# Patient Record
Sex: FEMALE | Race: WHITE | Employment: OTHER | URBAN - METROPOLITAN AREA
[De-identification: names, ages, dates, MRNs, and addresses within clinical notes are randomized per-mention and may not be internally consistent; named-entity substitution may affect disease eponyms.]

---

## 2017-05-30 ENCOUNTER — HOSPITAL ENCOUNTER (OUTPATIENT)
Dept: GENERAL RADIOLOGY | Age: 67
Discharge: HOME | End: 2017-05-30
Attending: FAMILY MEDICINE
Payer: MEDICARE

## 2017-05-30 DIAGNOSIS — M25.552 LEFT HIP PAIN: ICD-10-CM

## 2017-05-30 PROCEDURE — 73502 X-RAY EXAM HIP UNI 2-3 VIEWS: CPT

## 2017-05-30 NOTE — PROGRESS NOTES
I called pt with normal xray of hip/pelvis. Advised pt to call me back in a few days with update on her sxms, and whether prednisone is helping.   INEZ/addison

## 2017-08-15 ENCOUNTER — HOSPITAL ENCOUNTER (OUTPATIENT)
Dept: LAB | Age: 67
Discharge: HOME | End: 2017-08-15

## 2017-08-15 PROCEDURE — 88305 TISSUE EXAM BY PATHOLOGIST: CPT | Performed by: INTERNAL MEDICINE

## 2017-08-16 PROBLEM — G20 PARKINSON DISEASE (HCC): Status: ACTIVE | Noted: 2017-07-28

## 2017-08-24 ENCOUNTER — HOSPITAL ENCOUNTER (OUTPATIENT)
Dept: CT IMAGING | Age: 67
Discharge: HOME | End: 2017-08-24
Attending: FAMILY MEDICINE
Payer: MEDICARE

## 2017-08-24 DIAGNOSIS — R19.00 PELVIC MASS IN FEMALE: ICD-10-CM

## 2017-08-24 PROCEDURE — 74011000258 HC RX REV CODE- 258: Performed by: FAMILY MEDICINE

## 2017-08-24 PROCEDURE — 72193 CT PELVIS W/DYE: CPT

## 2017-08-24 PROCEDURE — 74011636320 HC RX REV CODE- 636/320: Performed by: FAMILY MEDICINE

## 2017-08-24 RX ORDER — SODIUM CHLORIDE 0.9 % (FLUSH) 0.9 %
10 SYRINGE (ML) INJECTION
Status: COMPLETED | OUTPATIENT
Start: 2017-08-24 | End: 2017-08-24

## 2017-08-24 RX ADMIN — IOPAMIDOL 100 ML: 755 INJECTION, SOLUTION INTRAVENOUS at 11:04

## 2017-08-24 RX ADMIN — DIATRIZOATE MEGLUMINE AND DIATRIZOATE SODIUM 15 ML: 660; 100 LIQUID ORAL; RECTAL at 11:05

## 2017-08-24 RX ADMIN — SODIUM CHLORIDE 100 ML: 900 INJECTION, SOLUTION INTRAVENOUS at 11:04

## 2017-08-24 RX ADMIN — Medication 10 ML: at 11:04

## 2017-08-24 NOTE — PROGRESS NOTES
Nothing on CT scan to explain palpable mass but it did show an incidental finding--left renal mass. Radiology recommends renal US, which I have ordered. Please call and explain to patient.

## 2017-08-30 ENCOUNTER — HOSPITAL ENCOUNTER (OUTPATIENT)
Dept: ULTRASOUND IMAGING | Age: 67
Discharge: HOME | End: 2017-08-30
Payer: MEDICARE

## 2017-08-30 DIAGNOSIS — N28.89 LEFT RENAL MASS: ICD-10-CM

## 2017-08-30 PROCEDURE — 76770 US EXAM ABDO BACK WALL COMP: CPT

## 2017-08-30 NOTE — PROGRESS NOTES
Patient notified of Renal US result and verbalized understanding of plan for Urology referral for further evaluation. Referral placed.

## 2017-09-21 ENCOUNTER — HOSPITAL ENCOUNTER (OUTPATIENT)
Dept: LAB | Age: 67
Discharge: HOME | End: 2017-09-21
Payer: MEDICARE

## 2017-09-21 DIAGNOSIS — N28.89 RENAL MASS: ICD-10-CM

## 2017-09-21 LAB
ANION GAP SERPL CALC-SCNC: 5 MMOL/L (ref 7–16)
APTT PPP: 27.4 SEC (ref 23.5–31.7)
BUN SERPL-MCNC: 18 MG/DL (ref 8–23)
CALCIUM SERPL-MCNC: 9.3 MG/DL (ref 8.3–10.4)
CHLORIDE SERPL-SCNC: 107 MMOL/L (ref 98–107)
CO2 SERPL-SCNC: 30 MMOL/L (ref 21–32)
CREAT SERPL-MCNC: 1.17 MG/DL (ref 0.6–1)
ERYTHROCYTE [DISTWIDTH] IN BLOOD BY AUTOMATED COUNT: 13.3 % (ref 11.9–14.6)
GLUCOSE SERPL-MCNC: 87 MG/DL (ref 65–100)
HCT VFR BLD AUTO: 40.6 % (ref 35.8–46.3)
HGB BLD-MCNC: 13.1 G/DL (ref 11.7–15.4)
INR PPP: 1 (ref 0.9–1.2)
MCH RBC QN AUTO: 28.8 PG (ref 26.1–32.9)
MCHC RBC AUTO-ENTMCNC: 32.3 G/DL (ref 31.4–35)
MCV RBC AUTO: 89.2 FL (ref 79.6–97.8)
PLATELET # BLD AUTO: 260 K/UL (ref 150–450)
PMV BLD AUTO: 9.7 FL (ref 10.8–14.1)
POTASSIUM SERPL-SCNC: 4.5 MMOL/L (ref 3.5–5.1)
PROTHROMBIN TIME: 10.7 SEC (ref 9.6–12)
RBC # BLD AUTO: 4.55 M/UL (ref 4.05–5.25)
SODIUM SERPL-SCNC: 142 MMOL/L (ref 136–145)
WBC # BLD AUTO: 6.4 K/UL (ref 4.3–11.1)

## 2017-09-21 PROCEDURE — 85730 THROMBOPLASTIN TIME PARTIAL: CPT | Performed by: UROLOGY

## 2017-09-21 PROCEDURE — 80048 BASIC METABOLIC PNL TOTAL CA: CPT | Performed by: UROLOGY

## 2017-09-21 PROCEDURE — 36415 COLL VENOUS BLD VENIPUNCTURE: CPT | Performed by: UROLOGY

## 2017-09-21 PROCEDURE — 85027 COMPLETE CBC AUTOMATED: CPT | Performed by: UROLOGY

## 2017-09-21 PROCEDURE — 85610 PROTHROMBIN TIME: CPT | Performed by: UROLOGY

## 2017-09-27 ENCOUNTER — HOSPITAL ENCOUNTER (OUTPATIENT)
Dept: CT IMAGING | Age: 67
Discharge: HOME | End: 2017-09-27
Attending: UROLOGY
Payer: MEDICARE

## 2017-09-27 VITALS
HEIGHT: 63 IN | BODY MASS INDEX: 26.4 KG/M2 | RESPIRATION RATE: 16 BRPM | OXYGEN SATURATION: 96 % | TEMPERATURE: 98.1 F | DIASTOLIC BLOOD PRESSURE: 70 MMHG | SYSTOLIC BLOOD PRESSURE: 144 MMHG | HEART RATE: 75 BPM | WEIGHT: 149 LBS

## 2017-09-27 DIAGNOSIS — N28.89 RENAL MASS: ICD-10-CM

## 2017-09-27 PROCEDURE — 74011000250 HC RX REV CODE- 250: Performed by: RADIOLOGY

## 2017-09-27 PROCEDURE — 99153 MOD SED SAME PHYS/QHP EA: CPT

## 2017-09-27 PROCEDURE — 74011250636 HC RX REV CODE- 250/636

## 2017-09-27 PROCEDURE — 99152 MOD SED SAME PHYS/QHP 5/>YRS: CPT

## 2017-09-27 PROCEDURE — 88305 TISSUE EXAM BY PATHOLOGIST: CPT | Performed by: UROLOGY

## 2017-09-27 PROCEDURE — 74011636320 HC RX REV CODE- 636/320: Performed by: RADIOLOGY

## 2017-09-27 PROCEDURE — 50200 RENAL BIOPSY PERQ: CPT

## 2017-09-27 PROCEDURE — 74011250636 HC RX REV CODE- 250/636: Performed by: RADIOLOGY

## 2017-09-27 RX ORDER — MORPHINE SULFATE 2 MG/ML
1 INJECTION, SOLUTION INTRAMUSCULAR; INTRAVENOUS
Status: DISCONTINUED | OUTPATIENT
Start: 2017-09-27 | End: 2017-10-01 | Stop reason: HOSPADM

## 2017-09-27 RX ORDER — SODIUM CHLORIDE 9 MG/ML
500 INJECTION, SOLUTION INTRAVENOUS CONTINUOUS
Status: DISCONTINUED | OUTPATIENT
Start: 2017-09-27 | End: 2017-10-01 | Stop reason: HOSPADM

## 2017-09-27 RX ORDER — FENTANYL CITRATE 50 UG/ML
25-50 INJECTION, SOLUTION INTRAMUSCULAR; INTRAVENOUS
Status: DISCONTINUED | OUTPATIENT
Start: 2017-09-27 | End: 2017-10-01 | Stop reason: HOSPADM

## 2017-09-27 RX ORDER — IBUPROFEN 600 MG/1
600 TABLET ORAL
Status: DISCONTINUED | OUTPATIENT
Start: 2017-09-27 | End: 2017-10-01 | Stop reason: HOSPADM

## 2017-09-27 RX ORDER — DIPHENHYDRAMINE HYDROCHLORIDE 50 MG/ML
50 INJECTION, SOLUTION INTRAMUSCULAR; INTRAVENOUS ONCE
Status: ACTIVE | OUTPATIENT
Start: 2017-09-27 | End: 2017-09-27

## 2017-09-27 RX ORDER — HYDROCODONE BITARTRATE AND ACETAMINOPHEN 5; 325 MG/1; MG/1
1 TABLET ORAL
Status: DISCONTINUED | OUTPATIENT
Start: 2017-09-27 | End: 2017-10-01 | Stop reason: HOSPADM

## 2017-09-27 RX ORDER — MIDAZOLAM HYDROCHLORIDE 1 MG/ML
.5-2 INJECTION, SOLUTION INTRAMUSCULAR; INTRAVENOUS
Status: DISCONTINUED | OUTPATIENT
Start: 2017-09-27 | End: 2017-10-01 | Stop reason: HOSPADM

## 2017-09-27 RX ORDER — SODIUM CHLORIDE 9 MG/ML
150 INJECTION, SOLUTION INTRAVENOUS CONTINUOUS
Status: ACTIVE | OUTPATIENT
Start: 2017-09-27 | End: 2017-09-28

## 2017-09-27 RX ORDER — LIDOCAINE HYDROCHLORIDE 20 MG/ML
20-200 INJECTION, SOLUTION INFILTRATION; PERINEURAL ONCE
Status: COMPLETED | OUTPATIENT
Start: 2017-09-27 | End: 2017-09-27

## 2017-09-27 RX ADMIN — LIDOCAINE HYDROCHLORIDE 40 MG: 20 INJECTION, SOLUTION INFILTRATION; PERINEURAL at 09:13

## 2017-09-27 RX ADMIN — SODIUM CHLORIDE 500 ML: 9 INJECTION, SOLUTION INTRAVENOUS at 08:45

## 2017-09-27 RX ADMIN — MIDAZOLAM HYDROCHLORIDE 0.5 MG: 1 INJECTION, SOLUTION INTRAMUSCULAR; INTRAVENOUS at 09:00

## 2017-09-27 RX ADMIN — MIDAZOLAM HYDROCHLORIDE 1 MG: 1 INJECTION, SOLUTION INTRAMUSCULAR; INTRAVENOUS at 08:48

## 2017-09-27 RX ADMIN — FENTANYL CITRATE 50 MCG: 50 INJECTION, SOLUTION INTRAMUSCULAR; INTRAVENOUS at 08:48

## 2017-09-27 RX ADMIN — FENTANYL CITRATE 25 MCG: 50 INJECTION, SOLUTION INTRAMUSCULAR; INTRAVENOUS at 09:00

## 2017-09-27 RX ADMIN — IOPAMIDOL 75 ML: 755 INJECTION, SOLUTION INTRAVENOUS at 09:10

## 2017-09-27 NOTE — PROGRESS NOTES
IR Nurse Pre-Procedure Checklist Part 2          Consent signed: Yes    H&P complete:  Yes    Antibiotics: Not applicable    Airway/Mallampati Done: Yes    Shaved: Not applicable    Pregnancy Form:Not applicable    Patient Position: Yes    MD Side: Yes     Biopsy Worksheet: Yes    Specimen Medium: Yes

## 2017-09-27 NOTE — H&P
Department of Interventional Radiology  [phone]    History and Physical    Patient:  Adalberto Michael MRN:  [id number]  SSN:  [id number]    YOB: 1950  Age:  69 y.o. Sex:  female      Primary Care Provider:  Twyla Barlow MD  Referring Physician:  Ye Doherty MD    Subjective:     Chief Complaint: biopsy    History of the Present Illness: The patient is a 69 y.o. female who presents for renal mass biopsy. Left solid appearing renal mass which would not be amenable to conservative treatment if malignant. NPO x meds. Past Medical History:   Diagnosis Date    GERD (gastroesophageal reflux disease)     Hypertension     Hypothyroidism     [facility] 7/28/2017    Last Assessment & Plan:  I agree with Dr. Giuseppe Knight that this could be dystonic tremor versus Parkinson's disease (tremor predominant). Given the fact that patient's tremors have not progressed in a significant way in the past and I to 10 years, I am leaning toward dystonic tremor too. 1. Will continue her current Mirapex and Artane. She has no cognitive complaints. 2. Followup in 6 months.  Tremor      Past Surgical History:   Procedure Laterality Date    HX COLONOSCOPY  2010    with EGD    HX REUBEN AND BSO          Review of Systems:    Pertinent items are noted in the History of Present Illness. Current Outpatient Prescriptions   Medication Sig    temazepam (RESTORIL) 15 mg capsule Take 1 Cap by mouth nightly as needed for Sleep.  atenolol (TENORMIN) 50 mg tablet Take 1 Tab by mouth daily.  losartan (COZAAR) 50 mg tablet Take 1 Tab by mouth daily.  levothyroxine (SYNTHROID) 100 mcg tablet Take 1 Tab by mouth Daily (before breakfast). Indications: hypothyroidism    omeprazole (PRILOSEC) 40 mg capsule Take 1 Cap by mouth daily.  pramipexole (MIRAPEX) 0.25 mg tablet     trihexyphenidyl (ARTANE) 5 mg tablet Take 5 mg by mouth.  Per Dr. Giuseppe Knight for tremor    simethicone (GAS-X EXTRA STRENGTH) 125 mg chewable tablet Take 125 mg by mouth every six (6) hours as needed for Flatulence. Current Facility-Administered Medications   Medication Dose Route Frequency    lidocaine (XYLOCAINE) 20 mg/mL (2 %) injection  mg   mg IntraDERMal ONCE    midazolam (VERSED) injection 0.5-2 mg  0.5-2 mg IntraVENous Multiple    fentaNYL citrate (PF) injection 25-50 mcg  25-50 mcg IntraVENous Multiple    0.9% sodium chloride infusion 500 mL  500 mL IntraVENous CONTINUOUS    diphenhydrAMINE (BENADRYL) injection 50 mg  50 mg IntraVENous ONCE        No Known Allergies    Family History   Problem Relation Age of Onset    Diabetes Other     Heart Disease Other      Social History   Substance Use Topics    Smoking status: Never Smoker    Smokeless tobacco: Never Used    Alcohol use 0.0 oz/week     0 Standard drinks or equivalent per week      Comment: rare        Objective:       Physical Examination:    Vitals:    09/27/17 0711 09/27/17 0717   BP: 161/77    Pulse: 73    Resp: 17    Temp: 98.1 °F (36.7 °C)    SpO2: 97%    Weight:  67.6 kg (149 lb)   Height:  5' 3\" (1.6 m)     Blood pressure 161/77, pulse 73, temperature 98.1 °F (36.7 °C), resp. rate 17, height 5' 3\" (1.6 m), weight 67.6 kg (149 lb), SpO2 97 %.   Gen: NAD  Cor: regular  Lungs: clr  Abd: soft, nontender  Ext: warm, no edema    Laboratory:     Lab Results   Component Value Date/Time    Sodium 142 09/21/2017 10:50 AM    Sodium 143 06/06/2017 10:17 AM    Potassium 4.5 09/21/2017 10:50 AM    Potassium 4.5 06/06/2017 10:17 AM    Chloride 107 09/21/2017 10:50 AM    Chloride 104 06/06/2017 10:17 AM    CO2 30 09/21/2017 10:50 AM    CO2 20 06/06/2017 10:17 AM    Anion gap 5 09/21/2017 10:50 AM    Glucose 87 09/21/2017 10:50 AM    Glucose 98 06/06/2017 10:17 AM    BUN 18 09/21/2017 10:50 AM    BUN 19 06/06/2017 10:17 AM    Creatinine 1.17 09/21/2017 10:50 AM    Creatinine 1.06 06/06/2017 10:17 AM    GFR est AA 59 09/21/2017 10:50 AM    GFR est AA 62 06/06/2017 10:17 AM    GFR est non-AA 49 09/21/2017 10:50 AM    GFR est non-AA 54 06/06/2017 10:17 AM    Calcium 9.3 09/21/2017 10:50 AM    Calcium 9.8 06/06/2017 10:17 AM    Albumin 4.2 06/06/2017 10:17 AM    Albumin 4.4 05/30/2017 11:32 AM    Protein, total 7.1 06/06/2017 10:17 AM    Protein, total 7.1 05/30/2017 11:32 AM    A-G Ratio 1.4 06/06/2017 10:17 AM    A-G Ratio 1.6 05/30/2017 11:32 AM    AST (SGOT) 10 06/06/2017 10:17 AM    AST (SGOT) 14 05/30/2017 11:32 AM    ALT (SGPT) 13 06/06/2017 10:17 AM    ALT (SGPT) 11 05/30/2017 11:32 AM     Lab Results   Component Value Date/Time    WBC 6.4 09/21/2017 10:50 AM    WBC 7.6 05/24/2017 02:21 PM    HGB 13.1 09/21/2017 10:50 AM    HGB 13.3 05/24/2017 02:21 PM    HCT 40.6 09/21/2017 10:50 AM    HCT 41.2 05/24/2017 02:21 PM    PLATELET 780 48/12/4910 10:50 AM    PLATELET 558 31/08/0147 02:21 PM     Lab Results   Component Value Date/Time    aPTT 27.4 09/21/2017 10:50 AM    Prothrombin time 10.7 09/21/2017 10:50 AM    INR 1.0 09/21/2017 10:50 AM       Assessment:     Left renal mass    Plan:     Planned Procedure:  Renal mass biopsy    Risks, benefits, and alternatives reviewed with patient and she agrees to proceed with the procedure.       Signed By: Laura Cormier PA-C     September 27, 2017

## 2017-09-27 NOTE — IP AVS SNAPSHOT
[facility] 
 
 
 [address] [place] [address] 
[phone] Patient: Mago Barrientos MRN: [id number] Current Discharge Medication List  
  
ASK your doctor about these medications Dose & Instructions Dispensing Information Comments Morning Noon Evening Bedtime  
 atenolol 50 mg tablet Commonly known as:  TENORMIN Your last dose was: Your next dose is:    
   
   
 Dose:  50 mg Take 1 Tab by mouth daily. Quantity:  90 Tab Refills:  1 GAS-X EXTRA STRENGTH 125 mg chewable tablet Generic drug:  simethicone Your last dose was: Your next dose is:    
   
   
 Dose:  125 mg Take 125 mg by mouth every six (6) hours as needed for Flatulence. Refills:  0  
     
   
   
   
  
 levothyroxine 100 mcg tablet Commonly known as:  SYNTHROID Your last dose was: Your next dose is:    
   
   
 Dose:  100 mcg Take 1 Tab by mouth Daily (before breakfast). Indications: hypothyroidism Quantity:  90 Tab Refills:  1  
     
   
   
   
  
 losartan 50 mg tablet Commonly known as:  COZAAR Your last dose was: Your next dose is:    
   
   
 Dose:  50 mg Take 1 Tab by mouth daily. Quantity:  90 Tab Refills:  1  
     
   
   
   
  
 omeprazole 40 mg capsule Commonly known as:  PRILOSEC Your last dose was: Your next dose is:    
   
   
 Dose:  40 mg Take 1 Cap by mouth daily. Quantity:  90 Cap Refills:  3  
     
   
   
   
  
 pramipexole 0.25 mg tablet Commonly known as:  MIRAPEX Your last dose was: Your next dose is:    
   
   
  Refills:  0  
     
   
   
   
  
 temazepam 15 mg capsule Commonly known as:  RESTORIL Your last dose was: Your next dose is:    
   
   
 Dose:  15 mg Take 1 Cap by mouth nightly as needed for Sleep. Quantity:  30 Cap Refills:  5 trihexyphenidyl 5 mg tablet Commonly known as:  ARTANE Your last dose was: Your next dose is:    
   
   
 Dose:  5 mg Take 5 mg by mouth. Per Dr. Alexandra De Los Santos for tremor Refills:  0

## 2017-09-27 NOTE — ROUTINE PROCESS
TRANSFER - OUT REPORT:    Verbal report given to Jerri Pizarro on Mera Oh  being transferred to IR recovery for routine post - op       Report consisted of patients Situation, Background, Assessment and   Recommendations(SBAR). Information from the following report(s) SBAR, Procedure Summary and MAR was reviewed with the receiving nurse. Opportunity for questions and clarification was provided. Conscious Sedation:   75 Mcg of Fentanyl administered  1.5 Mg of Versed administered    Pt tolerated procedure well. Visit Vitals    /75    Pulse 78    Temp 98.1 °F (36.7 °C)    Resp 20    Ht 5' 3\" (1.6 m)    Wt 67.6 kg (149 lb)    SpO2 100%    BMI 26.39 kg/m2     Past Medical History:   Diagnosis Date    GERD (gastroesophageal reflux disease)     Hypertension     Hypothyroidism     Parkinson disease ([facility] 7/28/2017    Last Assessment & Plan:  I agree with Dr. Mellisa Mcclure that this could be dystonic tremor versus Parkinson's disease (tremor predominant). Given the fact that patient's tremors have not progressed in a significant way in the past and I to 10 years, I am leaning toward dystonic tremor too. 1. Will continue her current Mirapex and Artane. She has no cognitive complaints. 2. Followup in 6 months.     Tremor      Peripheral IV 09/27/17 Left Antecubital (Active)   Site Assessment Clean, dry, & intact 9/27/2017  7:30 AM   Phlebitis Assessment 0 9/27/2017  7:30 AM   Infiltration Assessment 0 9/27/2017  7:30 AM   Dressing Status Clean, dry, & intact 9/27/2017  7:30 AM   Dressing Type Transparent 9/27/2017  7:30 AM

## 2017-09-27 NOTE — PROCEDURES
Interventional Radiology Brief Procedure Note    Patient: Rylee Flowers MRN: [id number]  SSN: [id number]    YOB: 1950  Age: 69 y.o. Sex: female      Date of Procedure: 9/27/2017    Pre-Procedure Diagnosis: Left Kidney Mass. Post-Procedure Diagnosis: SAME    Procedure(s): Image Guided Biopsy    Brief Description of Procedure: 25 G core bx    Performed By: Sonia Camilo MD     Assistants: None    Anesthesia: Moderate Sedation    Estimated Blood Loss: Less than 10ml    Specimens: Formalin to Pathology    Implants: None    Findings: Large, enhancing mass straddling the cortex/medullary fat of the left kidney. Complications: None    Recommendations: 3 hour bedrest.       Follow Up: Dr Jono Freitas.       Signed By: Sonia Camilo MD     September 27, 2017

## 2017-09-27 NOTE — PROGRESS NOTES
TRANSFER - IN REPORT:    Verbal report received from Mason RN(name) on Madeline Theodorelist  being received from CT(unit) for routine post - op      Report consisted of patients Situation, Background, Assessment and   Recommendations(SBAR). Information from the following report(s) Procedure Summary and MAR was reviewed with the receiving nurse. Opportunity for questions and clarification was provided. Assessment completed upon patients arrival to unit and care assumed.

## 2017-09-27 NOTE — PROGRESS NOTES
Recovery period without difficulty. Pt alert and oriented and denies pain. Dressing is clean, dry, and intact. Reviewed discharge instructions with patient and , both verbalized understanding. Pt escorted to lobby discharge area via wheelchair. Vital signs and Cyndy score completed.

## 2017-09-27 NOTE — DISCHARGE INSTRUCTIONS
[address]  Department of Interventional Radiology  [facility]  [phone] Office  [phone] Fax    BIOPSY DISCHARGE INSTRUCTIONS    General Instructions:     A biopsy is the removal of a small piece of tissue for microscopic examination or testing. Healthy tissue can be obtained for the purpose of tissue-type matching for transplants. Unhealthy tissues are more commonly biopsied to diagnose disease. Lung Biopsy:     A needle lung biopsy is performed when there is a mass discovered in the lung area. The most serious risk is infection, bleeding, and pneumothorax (a collapsed lung). Signs of pneumothorax include shortness of breath, rapid heart rate, and blueness of the skin. If any of these occur, call 911. Liver Biopsy: This test helps detect cancer, infections, and the cause of an enlargement of the liver or elevated liver enzymes. It also helps to diagnose a number of liver diseases. The pain associated with the procedure may be felt in the shoulder. The risks include internal bleeding, pneumothorax, and injury to the surrounding organs. Renal Biopsy: This procedure is sometimes done to evaluate a transplanted kidney. It is also used to evaluate unexplained decrease in kidney function, blood, or protein in the urine. You may see bright red blood in the urine the first 24 hours after the test. If the bleeding lasts longer, you need to call your doctor. There is a risk of infection and bleeding into the muscle, which may cause soreness. Spinal Biopsy: This test is sometimes done in conjunction with other procedures. Your back will be sore, as we are taking a small sample of bone, which is slightly more difficult to sample than tissue. General Biopsy:     A mass can grow in any area of the body, and we are taking a specimen as ordered by your doctor. The risks are the same.  They include bleeding, pain, and infection. Home Care Instructions: You may resume your regular diet and medication regimen. Do not drink alcohol, drive, or make any important legal decisions in the next 24 hours. Do not lift anything heavier than a gallon of milk until the soreness goes away. You may use over the counter acetaminophen or ibuprofen for the soreness. You may apply an ice pack to the affected area for 20-30 minutes at time for the first 24 hours. After that, you may apply a heat pack. Call If: You should call your Physician and/or the Radiology Nurse if you have any questions or concerns about the biopsy site. Call if you should have increased pain, fever, redness, drainage, or bleeding more than a small spot on the bandage. Follow-Up Instructions: Please see your ordering doctor as he/she has requested. Interventional Radiology General Nurse Discharge    After general anesthesia or intravenous sedation, for 24 hours or while taking prescription Narcotics:  · Limit your activities  · Do not drive and operate hazardous machinery  · Do not make important personal or business decisions  · Do  not drink alcoholic beverages  · If you have not urinated within 8 hours after discharge, please contact your surgeon on call. * Please give a list of your current medications to your Primary Care Provider. * Please update this list whenever your medications are discontinued, doses are     changed, or new medications (including over-the-counter products) are added. * Please carry medication information at all times in case of emergency situations. These are general instructions for a healthy lifestyle:    No smoking/ No tobacco products/ Avoid exposure to second hand smoke  Surgeon General's Warning:  Quitting smoking now greatly reduces serious risk to your health.     Obesity, smoking, and sedentary lifestyle greatly increases your risk for illness  A healthy diet, regular physical exercise & weight monitoring are important for maintaining a healthy lifestyle    You may be retaining fluid if you have a history of heart failure or if you experience any of the following symptoms:  Weight gain of 3 pounds or more overnight or 5 pounds in a week, increased swelling in our hands or feet or shortness of breath while lying flat in bed. Please call your doctor as soon as you notice any of these symptoms; do not wait until your next office visit. Recognize signs and symptoms of STROKE:  F-face looks uneven    A-arms unable to move or move unevenly    S-speech slurred or non-existent    T-time-call 911 as soon as signs and symptoms begin-DO NOT go       Back to bed or wait to see if you get better-TIME IS BRAIN. To Reach Us: If you have any questions about your procedure, please call the Interventional Radiology department at [phone]. After business hours (5pm) and weekends, call the answering service at [phone] and ask for the Radiologist on call to be paged. Si tiene Preguntas acerca del procedimiento, por favor llame al departamento de Radiología Intervencional al [phone]. Después de horas de oficina (5 pm) y los fines de [place], llamar al Sam Drop de llamadas al [phone] y pregunte por el Radiologo de [facility].           Patient Signature:  Date: 9/27/2017  Discharging Nurse: Arlen Brink RN

## 2017-09-27 NOTE — IP AVS SNAPSHOT
Kalbe Hollis 
 
 
 [address] [facility] [address] [facility] 
[phone] Patient: Batool Jacobo MRN: [id number] You are allergic to the following No active allergies Recent Documentation Height Weight BMI OB Status Smoking Status 1.6 m 67.6 kg 26.39 kg/m2 Postmenopausal Never Smoker Emergency Contacts Name Discharge Info Relation Home Work Mobile Emogene Ever  Spouse [[phone] About your hospitalization You were admitted on:  September 27, 2017 You last received care in the:  [facility] RADIOLOGY CT SCAN You were discharged on:  September 27, 2017 Unit phone number:  [phone] Why you were hospitalized Your primary diagnosis was:  Not on File Providers Seen During Your Hospitalizations Provider Role Specialty Primary office phone Vicente Kahn MD Attending Provider Urology [phone] Your Primary Care Physician (PCP) Primary Care Physician Office Phone Office Fax Víctor Gage [phone] Follow-up Information None Current Discharge Medication List  
  
ASK your doctor about these medications Dose & Instructions Dispensing Information Comments Morning Noon Evening Bedtime  
 atenolol 50 mg tablet Commonly known as:  TENORMIN Your last dose was: Your next dose is:    
   
   
 Dose:  50 mg Take 1 Tab by mouth daily. Quantity:  90 Tab Refills:  1 GAS-X EXTRA STRENGTH 125 mg chewable tablet Generic drug:  simethicone Your last dose was: Your next dose is:    
   
   
 Dose:  125 mg Take 125 mg by mouth every six (6) hours as needed for Flatulence. Refills:  0  
     
   
   
   
  
 levothyroxine 100 mcg tablet Commonly known as:  SYNTHROID Your last dose was: Your next dose is:    
   
   
 Dose:  100 mcg Take 1 Tab by mouth Daily (before breakfast). Indications: hypothyroidism Quantity:  90 Tab Refills:  1  
     
   
   
   
  
 losartan 50 mg tablet Commonly known as:  COZAAR Your last dose was: Your next dose is:    
   
   
 Dose:  50 mg Take 1 Tab by mouth daily. Quantity:  90 Tab Refills:  1  
     
   
   
   
  
 omeprazole 40 mg capsule Commonly known as:  PRILOSEC Your last dose was: Your next dose is:    
   
   
 Dose:  40 mg Take 1 Cap by mouth daily. Quantity:  90 Cap Refills:  3  
     
   
   
   
  
 pramipexole 0.25 mg tablet Commonly known as:  MIRAPEX Your last dose was: Your next dose is:    
   
   
  Refills:  0  
     
   
   
   
  
 temazepam 15 mg capsule Commonly known as:  RESTORIL Your last dose was: Your next dose is:    
   
   
 Dose:  15 mg Take 1 Cap by mouth nightly as needed for Sleep. Quantity:  30 Cap Refills:  5  
     
   
   
   
  
 trihexyphenidyl 5 mg tablet Commonly known as:  ARTANE Your last dose was: Your next dose is:    
   
   
 Dose:  5 mg Take 5 mg by mouth. Per Dr. Vira Marcos for tremor Refills:  0 Discharge Instructions Paul [address] Department of Interventional Radiology [facility] 
[phone] Office 
[phone] Fax BIOPSY DISCHARGE INSTRUCTIONS General Instructions: A biopsy is the removal of a small piece of tissue for microscopic examination or testing. Healthy tissue can be obtained for the purpose of tissue-type matching for transplants. Unhealthy tissues are more commonly biopsied to diagnose disease. Lung Biopsy: A needle lung biopsy is performed when there is a mass discovered in the lung area. The most serious risk is infection, bleeding, and pneumothorax (a collapsed lung).  Signs of pneumothorax include shortness of breath, rapid heart rate, and blueness of the skin. If any of these occur, call 911. Liver Biopsy: This test helps detect cancer, infections, and the cause of an enlargement of the liver or elevated liver enzymes. It also helps to diagnose a number of liver diseases. The pain associated with the procedure may be felt in the shoulder. The risks include internal bleeding, pneumothorax, and injury to the surrounding organs. Renal Biopsy: This procedure is sometimes done to evaluate a transplanted kidney. It is also used to evaluate unexplained decrease in kidney function, blood, or protein in the urine. You may see bright red blood in the urine the first 24 hours after the test. If the bleeding lasts longer, you need to call your doctor. There is a risk of infection and bleeding into the muscle, which may cause soreness. Spinal Biopsy: This test is sometimes done in conjunction with other procedures. Your back will be sore, as we are taking a small sample of bone, which is slightly more difficult to sample than tissue. General Biopsy: 
   A mass can grow in any area of the body, and we are taking a specimen as ordered by your doctor. The risks are the same. They include bleeding, pain, and infection. Home Care Instructions: You may resume your regular diet and medication regimen. Do not drink alcohol, drive, or make any important legal decisions in the next 24 hours. Do not lift anything heavier than a gallon of milk until the soreness goes away. You may use over the counter acetaminophen or ibuprofen for the soreness. You may apply an ice pack to the affected area for 20-30 minutes at time for the first 24 hours. After that, you may apply a heat pack. Call If: You should call your Physician and/or the Radiology Nurse if you have any questions or concerns about the biopsy site.  Call if you should have increased pain, fever, redness, drainage, or bleeding more than a small spot on the bandage. Follow-Up Instructions: Please see your ordering doctor as he/she has requested. Interventional Radiology General Nurse Discharge After general anesthesia or intravenous sedation, for 24 hours or while taking prescription Narcotics: · Limit your activities · Do not drive and operate hazardous machinery · Do not make important personal or business decisions · Do  not drink alcoholic beverages · If you have not urinated within 8 hours after discharge, please contact your surgeon on call. * Please give a list of your current medications to your Primary Care Provider. * Please update this list whenever your medications are discontinued, doses are 
   changed, or new medications (including over-the-counter products) are added. * Please carry medication information at all times in case of emergency situations. These are general instructions for a healthy lifestyle: No smoking/ No tobacco products/ Avoid exposure to second hand smoke Surgeon General's Warning:  Quitting smoking now greatly reduces serious risk to your health. Obesity, smoking, and sedentary lifestyle greatly increases your risk for illness A healthy diet, regular physical exercise & weight monitoring are important for maintaining a healthy lifestyle You may be retaining fluid if you have a history of heart failure or if you experience any of the following symptoms:  Weight gain of 3 pounds or more overnight or 5 pounds in a week, increased swelling in our hands or feet or shortness of breath while lying flat in bed. Please call your doctor as soon as you notice any of these symptoms; do not wait until your next office visit. Recognize signs and symptoms of STROKE: 
F-face looks uneven A-arms unable to move or move unevenly S-speech slurred or non-existent T-time-call 911 as soon as signs and symptoms begin-DO NOT go Back to bed or wait to see if you get better-TIME IS BRAIN. To Reach Us: If you have any questions about your procedure, please call the Interventional Radiology department at [phone]. After business hours (5pm) and weekends, call the answering service at [phone] and ask for the Radiologist on call to be paged. Si tiene Preguntas acerca del procedimiento, por favor llame al departamento de Radiología Intervencional al [phone]. Después de horas de oficina (5 pm) y los fines de [place], llamar al [facility] de llamadas al [phone] y pregunte por el Radiologo de [place]. Patient Signature: 
Date: 9/27/2017 Discharging Nurse: Merlin Coleman RN Discharge Orders None Introducing [facility] [place] [facility]! Teena Oglesby introduces [employer] patient portal. Now you can access parts of your medical record, email your doctor's office, and request medication refills online. 1. In your internet browser, go to [email or web address] 2. Click on the First Time User? Click Here link in the Sign In box. You will see the New Member Sign Up page. 3. Enter your [employer] Access Code exactly as it appears below. You will not need to use this code after youve completed the sign-up process. If you do not sign up before the expiration date, you must request a new code. · Ter[employer] Access Code: [email or web address] Expires: 11/22/2017  9:36 AM 
 
4. Enter the last four digits of your Social Security Number (xxxx) and Date of Birth (mm/dd/yyyy) as indicated and click Submit. You will be taken to the next sign-up page. 5. Create a Shodoggt ID. This will be your Teran[employer] login ID and cannot be changed, so think of one that is secure and easy to remember. 6. Create a [employer] password. You can change your password at any time. 7. Enter your Password Reset Question and Answer. This can be used at a later time if you forget your password. 8. Enter your e-mail address.  You will receive e-mail notification when new information is available in [employer]. 9. Click Sign Up. You can now view and download portions of your medical record. 10. Click the Download Summary menu link to download a portable copy of your medical information. If you have questions, please visit the Frequently Asked Questions section of the [employer] website. Remember, [employer] is NOT to be used for urgent needs. For medical emergencies, dial 911. Now available from your iPhone and Android! General Information Please provide this summary of care documentation to your next provider. Patient Signature:  ____________________________________________________________ Date:  ____________________________________________________________  
  
[place] Shove Provider Signature:  ____________________________________________________________ Date:  ____________________________________________________________

## 2017-10-27 ENCOUNTER — HOSPITAL ENCOUNTER (OUTPATIENT)
Dept: SURGERY | Age: 67
Discharge: HOME | End: 2017-10-27
Payer: MEDICARE

## 2017-10-27 VITALS
WEIGHT: 151 LBS | TEMPERATURE: 98.3 F | RESPIRATION RATE: 18 BRPM | BODY MASS INDEX: 26.75 KG/M2 | OXYGEN SATURATION: 98 % | HEART RATE: 77 BPM | HEIGHT: 63 IN | DIASTOLIC BLOOD PRESSURE: 76 MMHG | SYSTOLIC BLOOD PRESSURE: 144 MMHG

## 2017-10-27 LAB
ANION GAP SERPL CALC-SCNC: 4 MMOL/L (ref 7–16)
BUN SERPL-MCNC: 18 MG/DL (ref 8–23)
CALCIUM SERPL-MCNC: 8.9 MG/DL (ref 8.3–10.4)
CHLORIDE SERPL-SCNC: 110 MMOL/L (ref 98–107)
CO2 SERPL-SCNC: 30 MMOL/L (ref 21–32)
CREAT SERPL-MCNC: 1.18 MG/DL (ref 0.6–1)
ERYTHROCYTE [DISTWIDTH] IN BLOOD BY AUTOMATED COUNT: 13.5 % (ref 11.9–14.6)
GLUCOSE SERPL-MCNC: 82 MG/DL (ref 65–100)
HCT VFR BLD AUTO: 40 % (ref 35.8–46.3)
HGB BLD-MCNC: 13 G/DL (ref 11.7–15.4)
MCH RBC QN AUTO: 28.9 PG (ref 26.1–32.9)
MCHC RBC AUTO-ENTMCNC: 32.5 G/DL (ref 31.4–35)
MCV RBC AUTO: 88.9 FL (ref 79.6–97.8)
PLATELET # BLD AUTO: 237 K/UL (ref 150–450)
PMV BLD AUTO: 9.5 FL (ref 10.8–14.1)
POTASSIUM SERPL-SCNC: 3.6 MMOL/L (ref 3.5–5.1)
RBC # BLD AUTO: 4.5 M/UL (ref 4.05–5.25)
SODIUM SERPL-SCNC: 144 MMOL/L (ref 136–145)
WBC # BLD AUTO: 6.2 K/UL (ref 4.3–11.1)

## 2017-10-27 PROCEDURE — 85027 COMPLETE CBC AUTOMATED: CPT | Performed by: UROLOGY

## 2017-10-27 PROCEDURE — 80048 BASIC METABOLIC PNL TOTAL CA: CPT | Performed by: UROLOGY

## 2017-10-27 RX ORDER — SODIUM CHLORIDE 9 MG/ML
250 INJECTION, SOLUTION INTRAVENOUS AS NEEDED
Status: CANCELLED | OUTPATIENT
Start: 2017-10-27

## 2017-10-27 RX ORDER — ACETAMINOPHEN 325 MG/1
325 TABLET ORAL AS NEEDED
COMMUNITY
End: 2021-09-21

## 2017-10-27 NOTE — PERIOP NOTES
Patient verified name, , and surgery as listed in [facility]. TYPE  CASE:3            Orders: received. Labs per surgeon:  CBC/BMP. T/C 2UNITS PRBC Order signed and held for PAT  Labs per anesthesia protocol: None needed   EKG  :  None needed (PT DENIES ARRHYTHMIA, DM, CAD, ESRD, PVD, STROKE)     PT INSTRUCTED THAT SHE MUST COME BACK 17 TO [facility] BETWEEN [phone] FOR LAB DRAW AND THAT A GREEN BRACELET WILL BE PLACED THAT DAY THAT SHE MUST NOT REMOVE. PT STATES UNDERSTANDING. Chart marked and placed in chart room for charge RN to follow up. Pt instructed to finish registering after PAT, registration sheet time stamped and put in registration tray. N    Pt medications obtained  PT MEMORY, med bottles NOT visualized. Requested pt to validate each medication, dose and frequency while here today. Instructed patient to continue previous medications as prescribed prior to surgery and  to take the following medications the day of surgery according to anesthesia guidelines with a small sip of water : SYNTHROID, PRILOSEC, MIRAPEX, ARTANE       Continue all previous medications unless otherwise directed. Instructed patient to hold  the following medications prior to surgery: NONE    Patient instructed on the following and verbalized understanding: Pt teach back was successful and pt demonstrates knowledge of instruction  Arrive at MAIN entrance, time of arrival to be called the day before by 1700. Responsible adult must drive patient to and from hospital, stay during surgery and 24 hours postoperatively. Npo after midnight including gum, mints and ice chips. Use HIBICLENS   in the shower the night before and the morning of surgery. Leave all valuables at home. Instructed on no jewelry or body piercings on the dos. Bring insurance card and ID. No perfumes, oil, powder, colognes, makeup or  lotions on the skin.     Patient verbalized understanding of all instructions and provided all medical/health information to the best of their ability.

## 2017-10-27 NOTE — PERIOP NOTES
Recent Results (from the past 12 hour(s))   CBC W/O DIFF    Collection Time: 10/27/17  9:06 AM   Result Value Ref Range    WBC 6.2 4.3 - 11.1 K/uL    RBC 4.50 4.05 - 5.25 M/uL    HGB 13.0 11.7 - 15.4 g/dL    HCT 40.0 35.8 - 46.3 %    MCV 88.9 79.6 - 97.8 FL    MCH 28.9 26.1 - 32.9 PG    MCHC 32.5 31.4 - 35.0 g/dL    RDW 13.5 11.9 - 14.6 %    PLATELET 637 109 - 433 K/uL    MPV 9.5 (L) 10.8 - 46.1 FL   METABOLIC PANEL, BASIC    Collection Time: 10/27/17  9:06 AM   Result Value Ref Range    Sodium 144 136 - 145 mmol/L    Potassium 3.6 3.5 - 5.1 mmol/L    Chloride 110 (H) 98 - 107 mmol/L    CO2 30 21 - 32 mmol/L    Anion gap 4 (L) 7 - 16 mmol/L    Glucose 82 65 - 100 mg/dL    BUN 18 8 - 23 MG/DL    Creatinine 1.18 (H) 0.6 - 1.0 MG/DL    GFR est AA 58 (L) >60 ml/min/1.73m2    GFR est non-AA 48 (L) >60 ml/min/1.73m2    Calcium 8.9 8.3 - 10.4 MG/DL

## 2017-11-02 ENCOUNTER — HOSPITAL ENCOUNTER (OUTPATIENT)
Dept: LAB | Age: 67
Discharge: HOME | DRG: 658 | End: 2017-11-02
Payer: MEDICARE

## 2017-11-02 ENCOUNTER — ANESTHESIA EVENT (OUTPATIENT)
Dept: SURGERY | Age: 67
DRG: 658 | End: 2017-11-02
Payer: MEDICARE

## 2017-11-03 ENCOUNTER — ANESTHESIA (OUTPATIENT)
Dept: SURGERY | Age: 67
DRG: 658 | End: 2017-11-03
Payer: MEDICARE

## 2017-11-03 ENCOUNTER — HOSPITAL ENCOUNTER (INPATIENT)
Age: 67
LOS: 4 days | Discharge: HOME | DRG: 658 | End: 2017-11-07
Attending: UROLOGY | Admitting: UROLOGY
Payer: MEDICARE

## 2017-11-03 PROBLEM — C64.2 RENAL CELL CANCER, LEFT (HCC): Status: ACTIVE | Noted: 2017-11-03

## 2017-11-03 PROBLEM — N28.89 LEFT RENAL MASS: Status: ACTIVE | Noted: 2017-11-03

## 2017-11-03 LAB
ARTERIAL PATENCY WRIST A: POSITIVE
BASE DEFICIT BLDA-SCNC: 3.8 MMOL/L (ref 0–2)
BDY SITE: ABNORMAL
COHGB MFR BLD: 0.2 % (ref 0.5–1.5)
DO-HGB BLD-MCNC: 2 % (ref 0–5)
GAS FLOW.O2 O2 DELIVERY SYS: 6 L/MIN
HCO3 BLDA-SCNC: 24 MMOL/L (ref 22–26)
HGB BLDMV-MCNC: 13.3 GM/DL (ref 11.7–15)
METHGB MFR BLD: 0.6 % (ref 0–1.5)
OXYHGB MFR BLDA: 97.6 % (ref 94–97)
PCO2 BLDA: 58 MMHG (ref 35–45)
PH BLDA: 7.24 [PH] (ref 7.35–7.45)
PO2 BLDA: 150 MMHG (ref 80–105)
SAO2 % BLD: 98 % (ref 92–98.5)
SERVICE CMNT-IMP: ABNORMAL

## 2017-11-03 PROCEDURE — 82803 BLOOD GASES ANY COMBINATION: CPT

## 2017-11-03 PROCEDURE — 74011258636 HC RX REV CODE- 258/636: Performed by: UROLOGY

## 2017-11-03 PROCEDURE — 77030008756 HC TU IRR SUC STRY -B: Performed by: UROLOGY

## 2017-11-03 PROCEDURE — 74011250636 HC RX REV CODE- 250/636: Performed by: ANESTHESIOLOGY

## 2017-11-03 PROCEDURE — 77030022473 HC HNDL ENDO GIA UNIV USDA -C: Performed by: UROLOGY

## 2017-11-03 PROCEDURE — 88307 TISSUE EXAM BY PATHOLOGIST: CPT | Performed by: UROLOGY

## 2017-11-03 PROCEDURE — 74011250636 HC RX REV CODE- 250/636: Performed by: UROLOGY

## 2017-11-03 PROCEDURE — 77030031139 HC SUT VCRL2 J&J -A: Performed by: UROLOGY

## 2017-11-03 PROCEDURE — 77030008518 HC TBNG INSUF ENDO STRY -B: Performed by: UROLOGY

## 2017-11-03 PROCEDURE — 77030008467 HC STPLR SKN COVD -B: Performed by: UROLOGY

## 2017-11-03 PROCEDURE — 36600 WITHDRAWAL OF ARTERIAL BLOOD: CPT

## 2017-11-03 PROCEDURE — 77030002966 HC SUT PDS J&J -A: Performed by: UROLOGY

## 2017-11-03 PROCEDURE — 77030000038 HC TIP SCIS LAPSCP SURI -B: Performed by: UROLOGY

## 2017-11-03 PROCEDURE — 77030008522 HC TBNG INSUF LAPRO STRY -B: Performed by: UROLOGY

## 2017-11-03 PROCEDURE — 74011250636 HC RX REV CODE- 250/636

## 2017-11-03 PROCEDURE — 77030007956 HC PCH ENDOSC SPEC COVD -C: Performed by: UROLOGY

## 2017-11-03 PROCEDURE — 77030019908 HC STETH ESOPH SIMS -A: Performed by: ANESTHESIOLOGY

## 2017-11-03 PROCEDURE — 77030011640 HC PAD GRND REM COVD -A: Performed by: UROLOGY

## 2017-11-03 PROCEDURE — 77030020407 HC IV BLD WRMR ST 3M -A: Performed by: ANESTHESIOLOGY

## 2017-11-03 PROCEDURE — 76010000171 HC OR TIME 2 TO 2.5 HR INTENSV-TIER 1: Performed by: UROLOGY

## 2017-11-03 PROCEDURE — 74011000250 HC RX REV CODE- 250: Performed by: UROLOGY

## 2017-11-03 PROCEDURE — 77030022474 HC RELD STPLR ENDO GIA COVD -C: Performed by: UROLOGY

## 2017-11-03 PROCEDURE — 77030020782 HC GWN BAIR PAWS FLX 3M -B: Performed by: ANESTHESIOLOGY

## 2017-11-03 PROCEDURE — 74011000250 HC RX REV CODE- 250

## 2017-11-03 PROCEDURE — 74011000258 HC RX REV CODE- 258: Performed by: UROLOGY

## 2017-11-03 PROCEDURE — 77030034850: Performed by: UROLOGY

## 2017-11-03 PROCEDURE — 76210000010 HC REC RM PH I 7 TO 7.5 HR: Performed by: UROLOGY

## 2017-11-03 PROCEDURE — 77030009965 HC RELD STPLR ENDOS COVD -D: Performed by: UROLOGY

## 2017-11-03 PROCEDURE — 77030016151 HC PROTCTR LNS DFOG COVD -B: Performed by: UROLOGY

## 2017-11-03 PROCEDURE — 77030018836 HC SOL IRR NACL ICUM -A: Performed by: UROLOGY

## 2017-11-03 PROCEDURE — 94660 CPAP INITIATION&MGMT: CPT

## 2017-11-03 PROCEDURE — 0TT10ZZ RESECTION OF LEFT KIDNEY, OPEN APPROACH: ICD-10-PCS | Performed by: UROLOGY

## 2017-11-03 PROCEDURE — 77030009527 HC GEL PRT SYS AMR -E: Performed by: UROLOGY

## 2017-11-03 PROCEDURE — 74011250637 HC RX REV CODE- 250/637: Performed by: UROLOGY

## 2017-11-03 PROCEDURE — 76060000035 HC ANESTHESIA 2 TO 2.5 HR: Performed by: UROLOGY

## 2017-11-03 PROCEDURE — 65270000029 HC RM PRIVATE

## 2017-11-03 PROCEDURE — 74011250637 HC RX REV CODE- 250/637: Performed by: ANESTHESIOLOGY

## 2017-11-03 PROCEDURE — 77030035045 HC TRCR ENDOSC VRSPRT BLDLSS COVD -B: Performed by: UROLOGY

## 2017-11-03 PROCEDURE — 77030034154 HC SHR COAG HARM ACE J&J -F: Performed by: UROLOGY

## 2017-11-03 PROCEDURE — 77030008477 HC STYL SATN SLP COVD -A: Performed by: ANESTHESIOLOGY

## 2017-11-03 PROCEDURE — 77030025088 HC APPL CLP LIG 1 COVD -B: Performed by: UROLOGY

## 2017-11-03 PROCEDURE — 77030008703 HC TU ET UNCUF COVD -A: Performed by: ANESTHESIOLOGY

## 2017-11-03 PROCEDURE — 77030018673: Performed by: UROLOGY

## 2017-11-03 RX ORDER — LOSARTAN POTASSIUM 50 MG/1
50 TABLET ORAL DAILY
Status: DISCONTINUED | OUTPATIENT
Start: 2017-11-04 | End: 2017-11-07 | Stop reason: HOSPADM

## 2017-11-03 RX ORDER — NEOSTIGMINE METHYLSULFATE 1 MG/ML
INJECTION INTRAVENOUS AS NEEDED
Status: DISCONTINUED | OUTPATIENT
Start: 2017-11-03 | End: 2017-11-03 | Stop reason: HOSPADM

## 2017-11-03 RX ORDER — MORPHINE SULFATE 10 MG/ML
5 INJECTION, SOLUTION INTRAMUSCULAR; INTRAVENOUS
Status: DISCONTINUED | OUTPATIENT
Start: 2017-11-03 | End: 2017-11-07 | Stop reason: HOSPADM

## 2017-11-03 RX ORDER — SODIUM CHLORIDE, SODIUM LACTATE, POTASSIUM CHLORIDE, CALCIUM CHLORIDE 600; 310; 30; 20 MG/100ML; MG/100ML; MG/100ML; MG/100ML
75 INJECTION, SOLUTION INTRAVENOUS CONTINUOUS
Status: DISCONTINUED | OUTPATIENT
Start: 2017-11-03 | End: 2017-11-03 | Stop reason: HOSPADM

## 2017-11-03 RX ORDER — MIDAZOLAM HYDROCHLORIDE 1 MG/ML
2 INJECTION, SOLUTION INTRAMUSCULAR; INTRAVENOUS
Status: DISCONTINUED | OUTPATIENT
Start: 2017-11-03 | End: 2017-11-03 | Stop reason: HOSPADM

## 2017-11-03 RX ORDER — DEXTROSE, SODIUM CHLORIDE, SODIUM LACTATE, POTASSIUM CHLORIDE, AND CALCIUM CHLORIDE 5; .6; .31; .03; .02 G/100ML; G/100ML; G/100ML; G/100ML; G/100ML
75 INJECTION, SOLUTION INTRAVENOUS CONTINUOUS
Status: DISCONTINUED | OUTPATIENT
Start: 2017-11-03 | End: 2017-11-07 | Stop reason: HOSPADM

## 2017-11-03 RX ORDER — ACETAMINOPHEN 10 MG/ML
INJECTION, SOLUTION INTRAVENOUS AS NEEDED
Status: DISCONTINUED | OUTPATIENT
Start: 2017-11-03 | End: 2017-11-03 | Stop reason: HOSPADM

## 2017-11-03 RX ORDER — PANTOPRAZOLE SODIUM 40 MG/1
40 TABLET, DELAYED RELEASE ORAL
Status: DISCONTINUED | OUTPATIENT
Start: 2017-11-04 | End: 2017-11-07 | Stop reason: HOSPADM

## 2017-11-03 RX ORDER — NALOXONE HYDROCHLORIDE 0.4 MG/ML
0.2 INJECTION, SOLUTION INTRAMUSCULAR; INTRAVENOUS; SUBCUTANEOUS AS NEEDED
Status: DISCONTINUED | OUTPATIENT
Start: 2017-11-03 | End: 2017-11-03 | Stop reason: HOSPADM

## 2017-11-03 RX ORDER — BUPIVACAINE HYDROCHLORIDE 5 MG/ML
INJECTION, SOLUTION EPIDURAL; INTRACAUDAL AS NEEDED
Status: DISCONTINUED | OUTPATIENT
Start: 2017-11-03 | End: 2017-11-03 | Stop reason: HOSPADM

## 2017-11-03 RX ORDER — ATENOLOL 50 MG/1
50 TABLET ORAL
Status: DISCONTINUED | OUTPATIENT
Start: 2017-11-03 | End: 2017-11-07 | Stop reason: HOSPADM

## 2017-11-03 RX ORDER — FAMOTIDINE 20 MG/1
20 TABLET, FILM COATED ORAL ONCE
Status: COMPLETED | OUTPATIENT
Start: 2017-11-03 | End: 2017-11-03

## 2017-11-03 RX ORDER — ONDANSETRON 2 MG/ML
INJECTION INTRAMUSCULAR; INTRAVENOUS AS NEEDED
Status: DISCONTINUED | OUTPATIENT
Start: 2017-11-03 | End: 2017-11-03 | Stop reason: HOSPADM

## 2017-11-03 RX ORDER — OXYCODONE AND ACETAMINOPHEN 7.5; 325 MG/1; MG/1
1 TABLET ORAL
Status: DISCONTINUED | OUTPATIENT
Start: 2017-11-03 | End: 2017-11-07 | Stop reason: HOSPADM

## 2017-11-03 RX ORDER — TRIHEXYPHENIDYL HYDROCHLORIDE 2 MG/1
10 TABLET ORAL 2 TIMES DAILY
Status: DISCONTINUED | OUTPATIENT
Start: 2017-11-03 | End: 2017-11-07 | Stop reason: HOSPADM

## 2017-11-03 RX ORDER — SODIUM CHLORIDE 0.9 % (FLUSH) 0.9 %
5-10 SYRINGE (ML) INJECTION EVERY 8 HOURS
Status: DISCONTINUED | OUTPATIENT
Start: 2017-11-03 | End: 2017-11-07 | Stop reason: HOSPADM

## 2017-11-03 RX ORDER — HYDROMORPHONE HYDROCHLORIDE 2 MG/ML
INJECTION, SOLUTION INTRAMUSCULAR; INTRAVENOUS; SUBCUTANEOUS AS NEEDED
Status: DISCONTINUED | OUTPATIENT
Start: 2017-11-03 | End: 2017-11-03 | Stop reason: HOSPADM

## 2017-11-03 RX ORDER — SODIUM CHLORIDE 0.9 % (FLUSH) 0.9 %
5-10 SYRINGE (ML) INJECTION AS NEEDED
Status: DISCONTINUED | OUTPATIENT
Start: 2017-11-03 | End: 2017-11-07 | Stop reason: HOSPADM

## 2017-11-03 RX ORDER — ACETAMINOPHEN 325 MG/1
650 TABLET ORAL
Status: DISCONTINUED | OUTPATIENT
Start: 2017-11-03 | End: 2017-11-07 | Stop reason: HOSPADM

## 2017-11-03 RX ORDER — DOCUSATE SODIUM 100 MG/1
100 CAPSULE, LIQUID FILLED ORAL 2 TIMES DAILY
Status: DISCONTINUED | OUTPATIENT
Start: 2017-11-03 | End: 2017-11-07 | Stop reason: HOSPADM

## 2017-11-03 RX ORDER — LIDOCAINE HYDROCHLORIDE 10 MG/ML
0.1 INJECTION INFILTRATION; PERINEURAL AS NEEDED
Status: DISCONTINUED | OUTPATIENT
Start: 2017-11-03 | End: 2017-11-03 | Stop reason: HOSPADM

## 2017-11-03 RX ORDER — SODIUM CHLORIDE 9 MG/ML
INJECTION, SOLUTION INTRAVENOUS
Status: DISCONTINUED | OUTPATIENT
Start: 2017-11-03 | End: 2017-11-03 | Stop reason: HOSPADM

## 2017-11-03 RX ORDER — OXYCODONE AND ACETAMINOPHEN 5; 325 MG/1; MG/1
1 TABLET ORAL AS NEEDED
Status: DISCONTINUED | OUTPATIENT
Start: 2017-11-03 | End: 2017-11-03 | Stop reason: HOSPADM

## 2017-11-03 RX ORDER — GLYCOPYRROLATE 0.2 MG/ML
INJECTION INTRAMUSCULAR; INTRAVENOUS AS NEEDED
Status: DISCONTINUED | OUTPATIENT
Start: 2017-11-03 | End: 2017-11-03 | Stop reason: HOSPADM

## 2017-11-03 RX ORDER — ONDANSETRON 2 MG/ML
4 INJECTION INTRAMUSCULAR; INTRAVENOUS
Status: DISCONTINUED | OUTPATIENT
Start: 2017-11-03 | End: 2017-11-07 | Stop reason: HOSPADM

## 2017-11-03 RX ORDER — PRAMIPEXOLE DIHYDROCHLORIDE 0.25 MG/1
0.25 TABLET ORAL 2 TIMES DAILY
Status: DISCONTINUED | OUTPATIENT
Start: 2017-11-03 | End: 2017-11-07 | Stop reason: HOSPADM

## 2017-11-03 RX ORDER — SODIUM CHLORIDE 0.9 % (FLUSH) 0.9 %
5-10 SYRINGE (ML) INJECTION AS NEEDED
Status: DISCONTINUED | OUTPATIENT
Start: 2017-11-03 | End: 2017-11-03 | Stop reason: HOSPADM

## 2017-11-03 RX ORDER — ROCURONIUM BROMIDE 10 MG/ML
INJECTION, SOLUTION INTRAVENOUS AS NEEDED
Status: DISCONTINUED | OUTPATIENT
Start: 2017-11-03 | End: 2017-11-03 | Stop reason: HOSPADM

## 2017-11-03 RX ORDER — HYDROMORPHONE HYDROCHLORIDE 2 MG/ML
0.5 INJECTION, SOLUTION INTRAMUSCULAR; INTRAVENOUS; SUBCUTANEOUS
Status: DISCONTINUED | OUTPATIENT
Start: 2017-11-03 | End: 2017-11-03 | Stop reason: HOSPADM

## 2017-11-03 RX ORDER — CEFAZOLIN SODIUM IN 0.9 % NACL 2 G/50 ML
2 INTRAVENOUS SOLUTION, PIGGYBACK (ML) INTRAVENOUS ONCE
Status: COMPLETED | OUTPATIENT
Start: 2017-11-03 | End: 2017-11-03

## 2017-11-03 RX ORDER — NALOXONE HYDROCHLORIDE 0.4 MG/ML
0.4 INJECTION, SOLUTION INTRAMUSCULAR; INTRAVENOUS; SUBCUTANEOUS AS NEEDED
Status: DISCONTINUED | OUTPATIENT
Start: 2017-11-03 | End: 2017-11-07 | Stop reason: HOSPADM

## 2017-11-03 RX ORDER — ZOLPIDEM TARTRATE 5 MG/1
5 TABLET ORAL
Status: DISCONTINUED | OUTPATIENT
Start: 2017-11-03 | End: 2017-11-06

## 2017-11-03 RX ORDER — PROPOFOL 10 MG/ML
INJECTION, EMULSION INTRAVENOUS AS NEEDED
Status: DISCONTINUED | OUTPATIENT
Start: 2017-11-03 | End: 2017-11-03 | Stop reason: HOSPADM

## 2017-11-03 RX ORDER — FENTANYL CITRATE 50 UG/ML
INJECTION, SOLUTION INTRAMUSCULAR; INTRAVENOUS AS NEEDED
Status: DISCONTINUED | OUTPATIENT
Start: 2017-11-03 | End: 2017-11-03 | Stop reason: HOSPADM

## 2017-11-03 RX ORDER — LEVOTHYROXINE SODIUM 100 UG/1
100 TABLET ORAL
Status: DISCONTINUED | OUTPATIENT
Start: 2017-11-04 | End: 2017-11-07 | Stop reason: HOSPADM

## 2017-11-03 RX ORDER — DEXAMETHASONE SODIUM PHOSPHATE 4 MG/ML
INJECTION, SOLUTION INTRA-ARTICULAR; INTRALESIONAL; INTRAMUSCULAR; INTRAVENOUS; SOFT TISSUE AS NEEDED
Status: DISCONTINUED | OUTPATIENT
Start: 2017-11-03 | End: 2017-11-03 | Stop reason: HOSPADM

## 2017-11-03 RX ORDER — LIDOCAINE HYDROCHLORIDE 20 MG/ML
INJECTION, SOLUTION EPIDURAL; INFILTRATION; INTRACAUDAL; PERINEURAL AS NEEDED
Status: DISCONTINUED | OUTPATIENT
Start: 2017-11-03 | End: 2017-11-03 | Stop reason: HOSPADM

## 2017-11-03 RX ORDER — SODIUM CHLORIDE 9 MG/ML
250 INJECTION, SOLUTION INTRAVENOUS AS NEEDED
Status: DISCONTINUED | OUTPATIENT
Start: 2017-11-03 | End: 2017-11-07 | Stop reason: HOSPADM

## 2017-11-03 RX ADMIN — DEXAMETHASONE SODIUM PHOSPHATE 10 MG: 4 INJECTION, SOLUTION INTRA-ARTICULAR; INTRALESIONAL; INTRAMUSCULAR; INTRAVENOUS; SOFT TISSUE at 08:30

## 2017-11-03 RX ADMIN — PRAMIPEXOLE DIHYDROCHLORIDE 0.25 MG: 0.25 TABLET ORAL at 19:45

## 2017-11-03 RX ADMIN — HYDROMORPHONE HYDROCHLORIDE 0.5 MG: 2 INJECTION, SOLUTION INTRAMUSCULAR; INTRAVENOUS; SUBCUTANEOUS at 10:40

## 2017-11-03 RX ADMIN — SODIUM CHLORIDE, SODIUM LACTATE, POTASSIUM CHLORIDE, AND CALCIUM CHLORIDE 75 ML/HR: 600; 310; 30; 20 INJECTION, SOLUTION INTRAVENOUS at 13:48

## 2017-11-03 RX ADMIN — TRIHEXYPHENIDYL HYDROCHLORIDE 10 MG: 2 TABLET ORAL at 20:05

## 2017-11-03 RX ADMIN — Medication 10 ML: at 22:28

## 2017-11-03 RX ADMIN — HYDROMORPHONE HYDROCHLORIDE 0.5 MG: 2 INJECTION, SOLUTION INTRAMUSCULAR; INTRAVENOUS; SUBCUTANEOUS at 10:50

## 2017-11-03 RX ADMIN — GLYCOPYRROLATE 0.3 MG: 0.2 INJECTION INTRAMUSCULAR; INTRAVENOUS at 09:55

## 2017-11-03 RX ADMIN — CEFAZOLIN SODIUM 1 G: 1 INJECTION, POWDER, FOR SOLUTION INTRAMUSCULAR; INTRAVENOUS at 19:44

## 2017-11-03 RX ADMIN — ROCURONIUM BROMIDE 10 MG: 10 INJECTION, SOLUTION INTRAVENOUS at 08:55

## 2017-11-03 RX ADMIN — DOCUSATE SODIUM 100 MG: 100 CAPSULE, LIQUID FILLED ORAL at 19:45

## 2017-11-03 RX ADMIN — CEFAZOLIN 2 G: 1 INJECTION, POWDER, FOR SOLUTION INTRAMUSCULAR; INTRAVENOUS; PARENTERAL at 08:28

## 2017-11-03 RX ADMIN — ATENOLOL 50 MG: 50 TABLET ORAL at 22:27

## 2017-11-03 RX ADMIN — PROPOFOL 150 MG: 10 INJECTION, EMULSION INTRAVENOUS at 08:11

## 2017-11-03 RX ADMIN — HYDROMORPHONE HYDROCHLORIDE 0.5 MG: 2 INJECTION, SOLUTION INTRAMUSCULAR; INTRAVENOUS; SUBCUTANEOUS at 10:45

## 2017-11-03 RX ADMIN — GLYCOPYRROLATE 0.1 MG: 0.2 INJECTION INTRAMUSCULAR; INTRAVENOUS at 08:52

## 2017-11-03 RX ADMIN — ONDANSETRON 4 MG: 2 INJECTION INTRAMUSCULAR; INTRAVENOUS at 09:43

## 2017-11-03 RX ADMIN — MORPHINE SULFATE 2 MG: 10 INJECTION INTRAMUSCULAR; INTRAVENOUS; SUBCUTANEOUS at 19:46

## 2017-11-03 RX ADMIN — Medication: at 19:44

## 2017-11-03 RX ADMIN — ROCURONIUM BROMIDE 40 MG: 10 INJECTION, SOLUTION INTRAVENOUS at 08:12

## 2017-11-03 RX ADMIN — MIDAZOLAM HYDROCHLORIDE 1 MG: 1 INJECTION, SOLUTION INTRAMUSCULAR; INTRAVENOUS at 07:10

## 2017-11-03 RX ADMIN — SODIUM CHLORIDE, SODIUM LACTATE, POTASSIUM CHLORIDE, CALCIUM CHLORIDE, AND DEXTROSE MONOHYDRATE 125 ML/HR: 600; 310; 30; 20; 5 INJECTION, SOLUTION INTRAVENOUS at 19:00

## 2017-11-03 RX ADMIN — SODIUM CHLORIDE, SODIUM LACTATE, POTASSIUM CHLORIDE, AND CALCIUM CHLORIDE: 600; 310; 30; 20 INJECTION, SOLUTION INTRAVENOUS at 09:59

## 2017-11-03 RX ADMIN — NEOSTIGMINE METHYLSULFATE 2 MG: 1 INJECTION INTRAVENOUS at 09:55

## 2017-11-03 RX ADMIN — OXYCODONE HYDROCHLORIDE AND ACETAMINOPHEN 1 TABLET: 7.5; 325 TABLET ORAL at 22:28

## 2017-11-03 RX ADMIN — HYDROMORPHONE HYDROCHLORIDE 0.5 MG: 2 INJECTION, SOLUTION INTRAMUSCULAR; INTRAVENOUS; SUBCUTANEOUS at 11:10

## 2017-11-03 RX ADMIN — FAMOTIDINE 20 MG: 20 TABLET, FILM COATED ORAL at 06:21

## 2017-11-03 RX ADMIN — FENTANYL CITRATE 100 MCG: 50 INJECTION, SOLUTION INTRAMUSCULAR; INTRAVENOUS at 08:11

## 2017-11-03 RX ADMIN — SODIUM CHLORIDE, SODIUM LACTATE, POTASSIUM CHLORIDE, AND CALCIUM CHLORIDE 75 ML/HR: 600; 310; 30; 20 INJECTION, SOLUTION INTRAVENOUS at 06:30

## 2017-11-03 RX ADMIN — SODIUM CHLORIDE: 9 INJECTION, SOLUTION INTRAVENOUS at 08:20

## 2017-11-03 RX ADMIN — ACETAMINOPHEN 1000 MG: 10 INJECTION, SOLUTION INTRAVENOUS at 09:43

## 2017-11-03 RX ADMIN — HYDROMORPHONE HYDROCHLORIDE 0.2 MG: 2 INJECTION, SOLUTION INTRAMUSCULAR; INTRAVENOUS; SUBCUTANEOUS at 09:56

## 2017-11-03 RX ADMIN — LIDOCAINE HYDROCHLORIDE 80 MG: 20 INJECTION, SOLUTION EPIDURAL; INFILTRATION; INTRACAUDAL; PERINEURAL at 08:11

## 2017-11-03 NOTE — PROGRESS NOTES
TRANSFER - IN REPORT:    Verbal report received from ROEL Chin (name) on Roopa Ar  being received from PACU (unit) for routine progression of care      Report consisted of patients Situation, Background, Assessment and   Recommendations(SBAR). Information from the following report(s) SBAR, OR Summary, Procedure Summary and Intake/Output was reviewed with the receiving nurse. Opportunity for questions and clarification was provided. Assessment completed upon patients arrival to unit and care assumed.

## 2017-11-03 NOTE — PROGRESS NOTES
Patient resting in room. No signs or symptoms of distress. No changes in status. Patient states that she is starting to have pain, oncoming nurse notified and agrees to manage. Informed of low tolerance to pain meds.

## 2017-11-03 NOTE — PERIOP NOTES
TRANSFER - OUT REPORT:    Verbal report given to Shane Hendrix (name) on Matt Navy  being transferred to  616 unit) for routine post - op       Report consisted of patients Situation, Background, Assessment and   Recommendations(SBAR). Information from the following report(s) OR Summary was reveiwed with the receiving nurse. Opportunity for questions and clarification was provided. VTE prophylaxis orders have  been written for this patient.

## 2017-11-03 NOTE — ANESTHESIA POSTPROCEDURE EVALUATION
Post-Anesthesia Evaluation and Assessment    Patient: Lexx Sousa MRN: [id number]  SSN: [id number]    YOB: 1950  Age: 69 y.o. Sex: female       Cardiovascular Function/Vital Signs  Visit Vitals    /61    Pulse 81    Temp 36.1 °C (97 °F)    Resp 13    Ht 5' 3\" (1.6 m)    Wt 67.4 kg (148 lb 8 oz)    SpO2 95%    BMI 26.31 kg/m2       Patient is status post general anesthesia for Procedure(s):  LEFT RADICAL NEPHRECTOMY LAPAROSCOPIC HAND ASSISTED. Nausea/Vomiting: None    Postoperative hydration reviewed and adequate. Pain:  Pain Scale 1: Numeric (0 - 10) (11/03/17 1058)  Pain Intensity 1: 4 (11/03/17 1058)   Managed    Neurological Status:   Neuro (WDL): Within Defined Limits (11/03/17 1058)  Neuro  LUE Motor Response: Tremorous (11/03/17 5382)   At baseline    Mental Status and Level of Consciousness: Arousable    Pulmonary Status:   O2 Device: Nasal cannula (11/03/17 1058)   Adequate oxygenation and airway patent    Complications related to anesthesia: None    Post-anesthesia assessment completed.  No concerns    Signed By: Lori Harkins MD     November 3, 2017

## 2017-11-03 NOTE — PROGRESS NOTES
Patient received to room 616. Patient is still drowsy, but opens eyes to stimulus and responds appropriately. SCD on GERI legs. 3 sites with gauze to ABD. [place] draining CYU. All other areas skin is intact. No wounds. Assessment with oncoming nurse.

## 2017-11-03 NOTE — BRIEF OP NOTE
BRIEF OPERATIVE NOTE    Date of Procedure: 11/3/2017   Preoperative Diagnosis: Renal mass, left [N28.89]  Renal cell cancer, left (HCC) [C64.2]  Postoperative Diagnosis: Renal mass, left [N28.89]  Renal cell cancer, left (HCC) [C64.2]    Procedure(s):  LEFT RADICAL NEPHRECTOMY LAPAROSCOPIC HAND ASSISTED  Surgeon(s) and Role:     * LIZ House MD - Primary     * Miguel Angel Hatch MD - Assisting         Assistant Staff:       Surgical Staff:  Circ-1: Gurvinder Miles RN  Circ-2: Sathish Kearney RN  Circ-Relief: Marin Herrera RN  Scrub Tech-1: Oswaldo Minus  Event Time In   Incision Start 3043   Incision Close 9530     Anesthesia: General   Estimated Blood Loss: 100 ml  Specimens:   ID Type Source Tests Collected by Time Destination   1 : left kidney Fresh Kidney  Jered Quesada MD 11/3/2017 0940 Pathology      Findings: left renal mass   Complications:   Implants: * No implants in log *

## 2017-11-03 NOTE — PROGRESS NOTES
Patient's family escorted to see patient in PACU. Patient continues on Bipap.  and daughter comforted to see patient. MD and Claris Crigler, RN at bedside.

## 2017-11-03 NOTE — PROGRESS NOTES
TRANSFER - IN REPORT:    Verbal report received from GripeO, RN(name) on Dale Delvalle  being received from PACU (unit) for routine progression of care      Report consisted of patients Situation, Background, Assessment and   Recommendations(SBAR). Information from the following report(s) Recent Results was reviewed with the receiving nurse. Opportunity for questions and clarification was provided. Assessment completed upon patients arrival to unit and care assumed.

## 2017-11-03 NOTE — PROGRESS NOTES
Pt more alert. Changed from BiPAP to NC 2L. Multiple unsuccessful  attempts for ABG. Hold off on ABG for now per Dr Cathy Lopez.

## 2017-11-03 NOTE — IP AVS SNAPSHOT
[address] 
[phone] Patient: Dale Delvalle MRN: [id number] MLR:[id number] About your hospitalization You were admitted on:  November 3, 2017 You last received care in the:  [facility] 6 MED SURG You were discharged on:  November 7, 2017 Why you were hospitalized Your primary diagnosis was:  Not on File Your diagnoses also included:  Left Renal Mass, Renal Cell Cancer, Left (Hcc) Things You Need To Do (next 8 weeks) Follow up with Gómez Jaime MD in 1 week(s)  
office will call you with follow up appt Phone:  [phone] Where:  [address] , [facility] [address] Monday Nov 20, 2017 Office Visit with Michael Infante MD at  2:00 PM  
Where:  [facility] [address]) Discharge Orders None A check meri indicates which time of day the medication should be taken. My Medications STOP taking these medications   
 temazepam 15 mg capsule Commonly known as:  RESTORIL  
   
  
  
TAKE these medications as instructed Instructions Each Dose to Equal  
 Morning Noon Evening Bedtime  
 atenolol 50 mg tablet Commonly known as:  TENORMIN Your next dose is:  11/8 Take 1 Tab by mouth daily. 50 mg  
    
   
   
   
  
 levothyroxine 100 mcg tablet Commonly known as:  SYNTHROID Your next dose is:  11/8 Take 1 Tab by mouth Daily (before breakfast). Indications: hypothyroidism 100 mcg  
    
   
   
   
  
 losartan 50 mg tablet Commonly known as:  COZAAR Your next dose is:  11/8 Take 1 Tab by mouth daily. 50 mg  
    
   
   
   
  
 omeprazole 40 mg capsule Commonly known as:  PRILOSEC Your next dose is:  11/8 Take 1 Cap by mouth daily. 40 mg  
    
   
   
   
  
 pramipexole 0.25 mg tablet Commonly known as:  MIRAPEX Your next dose is: This evening Take 0.25 mg by mouth two (2) times a day. 0.25 mg  
    
   
   
  
   
  
 trihexyphenidyl 5 mg tablet Commonly known as:  ARTANE Your next dose is: This evening Take 10 mg by mouth two (2) times a day. Per Dr. Edward Baker for tremor 10 mg  
    
   
   
  
   
  
 TYLENOL 325 mg tablet Generic drug:  acetaminophen Your next dose is:  As needed Take 325 mg by mouth as needed for Pain. 325 mg Discharge Instructions DISCHARGE SUMMARY from Nurse PATIENT INSTRUCTIONS: 
 
After general anesthesia or intravenous sedation, for 24 hours or while taking prescription Narcotics: · Limit your activities · Do not drive and operate hazardous machinery · Do not make important personal or business decisions · Do  not drink alcoholic beverages · If you have not urinated within 8 hours after discharge, please contact your surgeon on call. Report the following to your surgeon: 
· Excessive pain, swelling, redness or odor of or around the surgical area · Temperature over 100.5 · Nausea and vomiting lasting longer than 4 hours or if unable to take medications · Any signs of decreased circulation or nerve impairment to extremity: change in color, persistent  numbness, tingling, coldness or increase pain · Any questions What to do at Home: 
Recommended activity: No lifting, Driving, or Strenuous exercise for 2 weeks If you experience any of the following symptoms increased pain, fever greater than 101, nausea/vomiting, or excessive wound drianage, please follow up with Dr. Mendoza Oh Keep incision clean and dry. May shower. *  Please give a list of your current medications to your Primary Care Provider. *  Please update this list whenever your medications are discontinued, doses are 
    changed, or new medications (including over-the-counter products) are added.  
 
*  Please carry medication information at all times in case of emergency situations. These are general instructions for a healthy lifestyle: No smoking/ No tobacco products/ Avoid exposure to second hand smoke Surgeon General's Warning:  Quitting smoking now greatly reduces serious risk to your health. Obesity, smoking, and sedentary lifestyle greatly increases your risk for illness A healthy diet, regular physical exercise & weight monitoring are important for maintaining a healthy lifestyle You may be retaining fluid if you have a history of heart failure or if you experience any of the following symptoms:  Weight gain of 3 pounds or more overnight or 5 pounds in a week, increased swelling in our hands or feet or shortness of breath while lying flat in bed. Please call your doctor as soon as you notice any of these symptoms; do not wait until your next office visit. Recognize signs and symptoms of STROKE: 
 
F-face looks uneven A-arms unable to move or move unevenly S-speech slurred or non-existent T-time-call 911 as soon as signs and symptoms begin-DO NOT go Back to bed or wait to see if you get better-TIME IS BRAIN. Warning Signs of HEART ATTACK Call 911 if you have these symptoms: 
? Chest discomfort. Most heart attacks involve discomfort in the center of the chest that lasts more than a few minutes, or that goes away and comes back. It can feel like uncomfortable pressure, squeezing, fullness, or pain. ? Discomfort in other areas of the upper body. Symptoms can include pain or discomfort in one or both arms, the back, neck, jaw, or stomach. ? Shortness of breath with or without chest discomfort. ? Other signs may include breaking out in a cold sweat, nausea, or lightheadedness. Don't wait more than five minutes to call [address]! Fast action can save your life. Calling 911 is almost always the fastest way to get lifesaving treatment.  Emergency Medical Services staff can begin treatment when they arrive  up to an hour sooner than if someone gets to the hospital by car. The discharge information has been reviewed with the patient. The patient verbalized understanding. Discharge medications reviewed with the patient and appropriate educational materials and side effects teaching were provided. ___________________________________________________________________________________________________________________________________ Laparoscopic Nephrectomy: What to Expect at [facility] Your Recovery A nephrectomy is surgery to take out part or all of the kidney. One or both kidneys may be taken out. Sometimes other tissue near the kidney is taken out at the same time. Your belly will feel sore after the surgery. This usually lasts about 1 to 2 weeks. Your doctor will give you pain medicine for this. You may also have other symptoms such as nausea, diarrhea, constipation, gas, or a headache. At first, you may have low energy and get tired quickly. It may take 3 to 6 months for your energy to fully return. Your body can work fine with one healthy kidney. If both kidneys are removed or your remaining kidney is not healthy, your doctor will talk to you about the kind of treatment you will need after surgery. This care sheet gives you a general idea about how long it will take for you to recover. But each person recovers at a different pace. Follow the steps below to get better as quickly as possible. How can you care for yourself at home? Activity ? · Rest when you feel tired. Getting enough sleep will help you recover. ? · Try to walk each day. Start by walking a little more than you did the day before. Bit by bit, increase the amount you walk. Walking boosts blood flow and helps prevent pneumonia and constipation.   
? · Avoid exercises that use your belly muscles and strenuous activities such as bicycle riding, jogging, weight lifting, or aerobic exercise until your doctor says it is okay. ? · For at least 4 weeks, avoid lifting anything that would make you strain. This may include a child, heavy grocery bags and milk containers, a heavy briefcase or backpack, cat litter or dog food bags, or a vacuum . ? · Hold a pillow over the cuts the doctor made (incisions) when you cough or take deep breaths. This will support your belly and decrease your pain. ? · Do breathing exercises at home as instructed by your doctor. This will help prevent pneumonia. ? · Ask your doctor when you can drive again. ? · You will probably need to take 4 to 6 weeks off from work. It depends on the type of work you do and how you feel. ? · You may be able to take showers (unless you have a drainage tube near your incisions). If you have a drainage tube, follow your doctor's instructions to empty and care for it. Do not take a bath for the first 2 weeks, or until your doctor tells you it is okay. ? · Ask your doctor when it is okay for you to have sex. Diet ? · You can eat your normal diet. If you were on a special diet for your kidneys before surgery, follow that diet until your doctor tells you to stop. ? · If your stomach is upset, try bland, low-fat foods like plain rice, broiled chicken, toast, and yogurt. ? · Drink plenty of fluids (unless your doctor tells you not to). ? · You may notice that your bowel movements are not regular right after your surgery. This is common. Try to avoid constipation and straining with bowel movements. You may want to take a fiber supplement every day. If you have not had a bowel movement after a couple of days, ask your doctor about taking a mild laxative. Medicines ? · Your doctor will tell you if and when you can restart your medicines. He or she will also give you instructions about taking any new medicines.   
? · If you take blood thinners, such as warfarin (Coumadin), clopidogrel (Plavix), or aspirin, be sure to talk to your doctor. He or she will tell you if and when to start taking those medicines again. Make sure that you understand exactly what your doctor wants you to do. ? · Take pain medicines exactly as directed. ¨ If the doctor gave you a prescription medicine for pain, take it as prescribed. ¨ If you are not taking a prescription pain medicine, take an over-the-counter medicine that your doctor recommends. Read and follow all instructions on the label. ¨ Do not take aspirin, ibuprofen (Advil, Motrin), or naproxen (Aleve), or other nonsteroidal anti-inflammatory drugs (NSAIDs) unless your doctor says it is okay. ? · If you think your pain medicine is making you sick to your stomach: 
¨ Take your medicine after meals (unless your doctor has told you not to). ¨ Ask your doctor for a different pain medicine. ? · If your doctor prescribed antibiotics, take them as directed. Do not stop taking them just because you feel better. You need to take the full course of antibiotics. Incision care ? · If you have strips of tape on the incisions, leave the tape on for a week or until it falls off.  
? · Wash the area around the incisions daily with warm, soapy water and pat it dry. Don't use hydrogen peroxide or alcohol, which can slow healing. You may cover the incisions with gauze bandages if they weep or rub against clothing. Change the bandages every day. ? · Keep the area around the incisions clean and dry. Follow-up care is a key part of your treatment and safety. Be sure to make and go to all appointments, and call your doctor if you are having problems. It's also a good idea to know your test results and keep a list of the medicines you take. When should you call for help? Call 911 anytime you think you may need emergency care. For example, call if: 
? · You passed out (lost consciousness). ? · You have chest pain, are short of breath, or cough up blood. ?Call your doctor now or seek immediate medical care if: 
? · You have pain that does not get better after you take your pain medicine. ? · You have symptoms of a urinary tract infection. These may include: 
¨ Pain or burning when you urinate. ¨ A frequent need to urinate without being able to pass much urine. ¨ Pain in the flank, which is just below the rib cage and above the waist on either side of the back. ¨ Blood in the urine. ¨ A fever. ? · You have signs of infection, such as: 
¨ Increased pain, swelling, warmth, or redness. ¨ Red streaks leading from the incisions. ¨ Pus draining from the incisions. ¨ A fever. ? · You have loose stitches, or your incisions come open. ? · You are bleeding from the incisions. ? · You cannot urinate. ? · You are sick to your stomach or cannot drink fluids. ? · You have signs of a blood clot in your leg (called a deep vein thrombosis), such as: 
¨ Pain in the calf, back of the knee, thigh, or groin. ¨ Redness and swelling in your leg. ? Watch closely for changes in your health, and be sure to contact your doctor if you are having any problems. Where can you learn more? Go to http://eneida-judy.info/. Enter 021 [id number] in the search box to learn more about \"Laparoscopic Nephrectomy: What to Expect at Home. \" Current as of: May 12, 2017 Content Version: 11.4 © 6972-7232 Lock[employer]. Care instructions adapted under license by [employer] (which disclaims liability or warranty for this information). If you have questions about a medical condition or this instruction, always ask your healthcare professional. [place] [address] any warranty or liability for your use of this information. Introducing [facility]! [facility] introduces VoiceGem patient portal. Now you can access parts of your medical record, email your doctor's office, and request medication refills online. 1. In your internet browser, go to https://[email or web address]. Kal[email or web address] 2. Click on the First Time User? Click Here link in the Sign In box. You will see the New Member Sign Up page. 3. Enter your Xlumena Access Code exactly as it appears below. You will not need to use this code after youve completed the sign-up process. If you do not sign up before the expiration date, you must request a new code. · Xlumena Access Code: [email or web address] Expires: 11/22/2017  8:36 AM 
 
4. Enter the last four digits of your Social Security Number (xxxx) and Date of Birth (mm/dd/yyyy) as indicated and click Submit. You will be taken to the next sign-up page. 5. Create a Formotust ID. This will be your Xlumena login ID and cannot be changed, so think of one that is secure and easy to remember. 6. Create a Xlumena password. You can change your password at any time. 7. Enter your Password Reset Question and Answer. This can be used at a later time if you forget your password. 8. Enter your e-mail address. You will receive e-mail notification when new information is available in [address] 9. Click Sign Up. You can now view and download portions of your medical record. 10. Click the Download Summary menu link to download a portable copy of your medical information. If you have questions, please visit the Frequently Asked Questions section of the Xlumena website. Remember, Xlumena is NOT to be used for urgent needs. For medical emergencies, dial 911. Now available from your iPhone and Android! Providers Seen During Your Hospitalization Provider Specialty Primary office phone Brian Cortez MD Urology [phone] Immunizations Administered for This Admission Name Date Influenza Vaccine (Quad) PF  Deferred () Your Primary Care Physician (PCP) Primary Care Physician Office Phone Office Fax Josie Floor [phone] You are allergic to the following No active allergies Recent Documentation Height Weight BMI OB Status Smoking Status 1.6 m 67.4 kg 26.31 kg/m2 Hysterectomy Never Smoker Emergency Contacts Name Discharge Info Relation Home Work Mobile [address] CAREGIVER [3] Spouse [3] [phone] Patient Belongings The following personal items are in your possession at time of discharge: 
  Dental Appliances: None  Visual Aid: None          Jewelry: None  Clothing: Shirt, Pants, Footwear, Undergarments    Other Valuables: None Please provide this summary of care documentation to your next provider. Signatures-by signing, you are acknowledging that this After Visit Summary has been reviewed with you and you have received a copy. Patient Signature:  ____________________________________________________________ Date:  ____________________________________________________________  
  
[facility] Provider Signature:  ____________________________________________________________ Date:  ____________________________________________________________

## 2017-11-03 NOTE — ROUTINE PROCESS
TRANSFER - OUT REPORT:    Verbal report given to Sahil Carmichael RN on Miguel Angel Garcia  being transferred to [phone]  for routine progression of care       Report consisted of patients Situation, Background, Assessment and   Recommendations(SBAR). Information from the following report(s) SBAR, Intake/Output and MAR was reviewed with the receiving nurse. Lines:   Peripheral IV 11/03/17 Left Forearm (Active)   Site Assessment Clean, dry, & intact 11/3/2017 10:42 AM   Phlebitis Assessment 0 11/3/2017 10:42 AM   Infiltration Assessment 0 11/3/2017 10:42 AM   Dressing Status Clean, dry, & intact 11/3/2017 10:42 AM   Dressing Type Transparent 11/3/2017 10:42 AM   Hub Color/Line Status Pink; Infusing 11/3/2017  6:39 AM        Opportunity for questions and clarification was provided. Patient transported with:   O2 @ 2 liters    VTE prophylaxis orders have been written for Miguel Angel Garcia. Patient and family given floor number and nurses name. Family updated re: pt status after security code verified.

## 2017-11-03 NOTE — H&P
Chief Complaint   Patient presents with    Renal Mass       Surgery today      Patient had a suspicious renal lesion that was biopsied.         Lesion was clear cell renal; cancer and malignant the patient will need a hand-assisted left radical nephrectomy. Navdeep Lomeli is not in a place where this could be treated conservatively.     CHIRAG Clemente is a 69 y.o. female         DIAGNOSIS   LEFT RENAL BIOPSY: RENAL CELL CARCINOMA, CLEAR CELL TYPE, LUIS GRADE 2.   tls/9/28/2017   Electronically signed out on 10/5/2017 13:03 by Willis Snyder. Molly Ignacio MD              Past Medical History:   Diagnosis Date    GERD (gastroesophageal reflux disease)      Hypertension      Hypothyroidism      Parkinson disease ([facility] [address] 7/28/2017     Last Assessment & Plan:  I agree with Dr. Sharron Bhat that this could be dystonic tremor versus Parkinson's disease (tremor predominant). Given the fact that patient's tremors have not progressed in a significant way in the past and I to 10 years, I am leaning toward dystonic tremor too. 1. Will continue her current Mirapex and Artane. She has no cognitive complaints. 2. Followup in 6 months.  Tremor              Past Surgical History:   Procedure Laterality Date    HX COLONOSCOPY   2010     with EGD    HX REUBEN AND BSO                 Current Outpatient Prescriptions   Medication Sig Dispense Refill    temazepam (RESTORIL) 15 mg capsule Take 1 Cap by mouth nightly as needed for Sleep. 30 Cap 5    atenolol (TENORMIN) 50 mg tablet Take 1 Tab by mouth daily. 90 Tab 1    losartan (COZAAR) 50 mg tablet Take 1 Tab by mouth daily. 90 Tab 1    levothyroxine (SYNTHROID) 100 mcg tablet Take 1 Tab by mouth Daily (before breakfast). Indications: hypothyroidism 90 Tab 1    omeprazole (PRILOSEC) 40 mg capsule Take 1 Cap by mouth daily.  90 Cap 3    pramipexole (MIRAPEX) 0.25 mg tablet          simethicone (GAS-X EXTRA STRENGTH) 125 mg chewable tablet Take 125 mg by mouth every six (6) hours as needed for Flatulence.        trihexyphenidyl (ARTANE) 5 mg tablet Take 5 mg by mouth. Per Dr. Chang Avila for tremor          No Known Allergies  Social History            Social History    Marital status:        Spouse name: N/A    Number of children: N/A    Years of education: N/A          Occupational History    Not on file.              Social History Main Topics     Smoking status: Never Smoker     Smokeless tobacco: Never Used     Alcohol use 0.0 oz/week       0 Standard drinks or equivalent per week         Comment: rare     Drug use: No     Sexual activity: Not on file            Other Topics Concern    Not on file      Social History Narrative            Family History   Problem Relation Age of Onset    Diabetes Other      Heart Disease Other           Review of Systems  Constitutional:   Negative for fever and chills. Cardiovascular:  Negative for hypertension. Genitourinary:  Negative for hematuria, urgency and frequent urination. Musculoskeletal:  Negative for back pain.     Physical Exam   Constitutional: She is oriented to person, place, and time. She appears well-developed and well-nourished. Cardiovascular: Normal rate. Pulmonary/Chest: Effort normal.   Abdominal: Soft. Musculoskeletal: Normal range of motion. Neurological: She is alert and oriented to person, place, and time. Skin: Skin is warm and dry. Psychiatric: She has a normal mood and affect.               Assessment and Plan      ICD-10-CM ICD-9-CM     1. Renal mass N28.89 593.9 AMB POC URINALYSIS DIP STICK AUTO W/ MICRO (PGU)   2. Renal cell cancer, left (HCC) C64.2 189. 0     Left intrarenal clear cell carcinoma of the kidney.   Patient will need a hand-assisted left nephrectomy I discussed the procedure including the risks and complications we will set this up sometime in the near future.      counseled the patient and  and discussed the procedure for the left radical nephrectomy and risks and complications

## 2017-11-03 NOTE — OP NOTES
[address]   OPERATIVE REPORT       Name:  Fabiola Barkley   MR#:  [id number]   :  1950   Account #:  [de-identified]   Date of Adm:  2017       DATE OF PROCEDURE: 2017. PREPROCEDURE DIAGNOSIS: Left renal cell cancer from renal   biopsy. POSTPROCEDURE DIAGNOSIS: Left renal cell cancer from renal   biopsy. NAME OF PROCEDURE: Hand-assisted left radical nephrectomy. SURGEON: JESUS ALBERTO Cool MD.    ANESTHESIA: General.    BLOOD LOSS: 100 mL. OPERATIVE INDICATION: A 49-year-old female with a central renal   mass that was biopsied and came back clear cell carcinoma of the   kidney. It was elected to do hand-assisted laparoscopic   nephrectomy. PROCEDURE: The patient was taken to the operating room suite,   underwent general anesthesia, placed in supine position with a   roll under the left flank. A right paramedian incision was made   in the upper abdomen just big enough to place the surgeon's   hand. The patient had the GelPort placed. The patient had the   working port just inferior to the umbilicus and the scope 12 mm   port was passed on the right lower quadrant. The patient had the   abdomen inspected. Everything appeared normal, liver,   gallbladder, spleen and bowel. At this point, the patient had   the lateral aspect of the left colon freed up off of the   Gerota's fascia and mobilized medially and followed down until   the gonadal vessels and ureter were identified. These were   divided with the stapler. The patient had the posterior renal   attachments freed up all the way up towards the spleen. Then, following up towards the hilum of the kidney, branching   renal veins were identified takeoff for lumbar and for the   gonadal vessels were identified. With the artery posterior to   this, the artery was not very large. The patient then had some   of the medial upper attachments near the adrenal gland slowly   taken down and cauterized for hemostasis.       At this point, the patient had the vessels skeletonized as much   as possible with all the branching of the renal vein. It was   thought it would be best just to staple the entire renal vein   plexus and artery using the 60 mm stapler. The vessels were   stapled and then divided with good hemostasis. The patient had   the upper attachments freed up off the kidney. The kidney was   then put into the retrieval bag and pulled out through the hand   port. This was sent to the lab for pathologic analysis. The   patient had the renal fossa inspected. There was good   hemostasis. The spleen was normal. There was no adrenal   bleeding. The colon was placed back in its anatomic position. The port placement sites had the suture passer used to place a   2-0 Vicryl suture for closure of these sites of perforation and   then the patient had the abdominal incision and the right upper   paramedian closed in 2 layers with some #1 PDS with good repair. The skin edges were approximated with the skin stapler after   local anesthetic was used. The patient tolerated the procedure   well, about 100 mL of blood loss, and she was sent to the   recovery area in stable condition.         MD Louise Art / Jay   D:  11/03/2017   10:20   T:  11/03/2017   15:21   Job #:  [id number]

## 2017-11-03 NOTE — ANESTHESIA PREPROCEDURE EVALUATION
Anesthetic History   No history of anesthetic complications            Review of Systems / Medical History  Patient summary reviewed, nursing notes reviewed and pertinent labs reviewed    Pulmonary  Within defined limits                 Neuro/Psych             Comments: Tremor in left arm and leg, no official diagnosis Cardiovascular    Hypertension: well controlled              Exercise tolerance: >4 METS     GI/Hepatic/Renal     GERD: well controlled           Endo/Other      Hypothyroidism: well controlled       Other Findings              Physical Exam    Airway  Mallampati: II  TM Distance: < 4 cm  Neck ROM: normal range of motion   Mouth opening: Normal     Cardiovascular    Rhythm: regular           Dental  No notable dental hx       Pulmonary  Breath sounds clear to auscultation               Abdominal  GI exam deferred       Other Findings            Anesthetic Plan    ASA: 2  Anesthesia type: general          Induction: Intravenous  Anesthetic plan and risks discussed with: Patient

## 2017-11-04 LAB
ANION GAP SERPL CALC-SCNC: 9 MMOL/L (ref 7–16)
BASOPHILS # BLD: 0 K/UL (ref 0–0.2)
BASOPHILS NFR BLD: 0 % (ref 0–2)
BUN SERPL-MCNC: 16 MG/DL (ref 8–23)
CALCIUM SERPL-MCNC: 8.2 MG/DL (ref 8.3–10.4)
CHLORIDE SERPL-SCNC: 103 MMOL/L (ref 98–107)
CO2 SERPL-SCNC: 27 MMOL/L (ref 21–32)
CREAT SERPL-MCNC: 1.69 MG/DL (ref 0.6–1)
DIFFERENTIAL METHOD BLD: ABNORMAL
EOSINOPHIL # BLD: 0 K/UL (ref 0–0.8)
EOSINOPHIL NFR BLD: 0 % (ref 0.5–7.8)
ERYTHROCYTE [DISTWIDTH] IN BLOOD BY AUTOMATED COUNT: 13.5 % (ref 11.9–14.6)
GLUCOSE SERPL-MCNC: 132 MG/DL (ref 65–100)
HCT VFR BLD AUTO: 32.7 % (ref 35.8–46.3)
HGB BLD-MCNC: 10.9 G/DL (ref 11.7–15.4)
IMM GRANULOCYTES # BLD: 0 K/UL (ref 0–0.5)
IMM GRANULOCYTES NFR BLD: 0 % (ref 0–5)
LYMPHOCYTES # BLD: 1.4 K/UL (ref 0.5–4.6)
LYMPHOCYTES NFR BLD: 14 % (ref 13–44)
MCH RBC QN AUTO: 29.1 PG (ref 26.1–32.9)
MCHC RBC AUTO-ENTMCNC: 33.3 G/DL (ref 31.4–35)
MCV RBC AUTO: 87.2 FL (ref 79.6–97.8)
MONOCYTES # BLD: 0.9 K/UL (ref 0.1–1.3)
MONOCYTES NFR BLD: 10 % (ref 4–12)
NEUTS SEG # BLD: 7.2 K/UL (ref 1.7–8.2)
NEUTS SEG NFR BLD: 76 % (ref 43–78)
PLATELET # BLD AUTO: 201 K/UL (ref 150–450)
PMV BLD AUTO: 9.6 FL (ref 10.8–14.1)
POTASSIUM SERPL-SCNC: 4.3 MMOL/L (ref 3.5–5.1)
RBC # BLD AUTO: 3.75 M/UL (ref 4.05–5.25)
SODIUM SERPL-SCNC: 139 MMOL/L (ref 136–145)
WBC # BLD AUTO: 9.5 K/UL (ref 4.3–11.1)

## 2017-11-04 PROCEDURE — 74011250636 HC RX REV CODE- 250/636: Performed by: UROLOGY

## 2017-11-04 PROCEDURE — 74011250637 HC RX REV CODE- 250/637: Performed by: UROLOGY

## 2017-11-04 PROCEDURE — 85025 COMPLETE CBC W/AUTO DIFF WBC: CPT | Performed by: UROLOGY

## 2017-11-04 PROCEDURE — 36415 COLL VENOUS BLD VENIPUNCTURE: CPT | Performed by: UROLOGY

## 2017-11-04 PROCEDURE — 94760 N-INVAS EAR/PLS OXIMETRY 1: CPT

## 2017-11-04 PROCEDURE — 65270000029 HC RM PRIVATE

## 2017-11-04 PROCEDURE — 77030027138 HC INCENT SPIROMETER -A

## 2017-11-04 PROCEDURE — 80048 BASIC METABOLIC PNL TOTAL CA: CPT | Performed by: UROLOGY

## 2017-11-04 PROCEDURE — 74011000258 HC RX REV CODE- 258: Performed by: UROLOGY

## 2017-11-04 RX ADMIN — OXYCODONE HYDROCHLORIDE AND ACETAMINOPHEN 1 TABLET: 7.5; 325 TABLET ORAL at 08:28

## 2017-11-04 RX ADMIN — DOCUSATE SODIUM 100 MG: 100 CAPSULE, LIQUID FILLED ORAL at 08:27

## 2017-11-04 RX ADMIN — OXYCODONE HYDROCHLORIDE AND ACETAMINOPHEN 1 TABLET: 7.5; 325 TABLET ORAL at 14:45

## 2017-11-04 RX ADMIN — MORPHINE SULFATE 5 MG: 10 INJECTION INTRAMUSCULAR; INTRAVENOUS; SUBCUTANEOUS at 01:12

## 2017-11-04 RX ADMIN — ATENOLOL 50 MG: 50 TABLET ORAL at 21:25

## 2017-11-04 RX ADMIN — LOSARTAN POTASSIUM 50 MG: 50 TABLET ORAL at 08:27

## 2017-11-04 RX ADMIN — DOCUSATE SODIUM 100 MG: 100 CAPSULE, LIQUID FILLED ORAL at 17:12

## 2017-11-04 RX ADMIN — CEFAZOLIN SODIUM 1 G: 1 INJECTION, POWDER, FOR SOLUTION INTRAMUSCULAR; INTRAVENOUS at 12:05

## 2017-11-04 RX ADMIN — Medication 10 ML: at 21:27

## 2017-11-04 RX ADMIN — CEFAZOLIN SODIUM 1 G: 1 INJECTION, POWDER, FOR SOLUTION INTRAMUSCULAR; INTRAVENOUS at 02:34

## 2017-11-04 RX ADMIN — PANTOPRAZOLE SODIUM 40 MG: 40 TABLET, DELAYED RELEASE ORAL at 05:36

## 2017-11-04 RX ADMIN — MORPHINE SULFATE 5 MG: 10 INJECTION INTRAMUSCULAR; INTRAVENOUS; SUBCUTANEOUS at 19:54

## 2017-11-04 RX ADMIN — Medication 10 ML: at 13:32

## 2017-11-04 RX ADMIN — TRIHEXYPHENIDYL HYDROCHLORIDE 10 MG: 2 TABLET ORAL at 17:12

## 2017-11-04 RX ADMIN — TRIHEXYPHENIDYL HYDROCHLORIDE 10 MG: 2 TABLET ORAL at 08:26

## 2017-11-04 RX ADMIN — PRAMIPEXOLE DIHYDROCHLORIDE 0.25 MG: 0.25 TABLET ORAL at 08:28

## 2017-11-04 RX ADMIN — PRAMIPEXOLE DIHYDROCHLORIDE 0.25 MG: 0.25 TABLET ORAL at 17:12

## 2017-11-04 NOTE — PROGRESS NOTES
Primary Nurse Dorina Greer RN and Violeta Goldstein RN performed a dual skin assessment on this patient No impairment noted  Ender score is 22

## 2017-11-04 NOTE — PROGRESS NOTES
IS given with instructions on how to use and pt demonstrated back. Encouraged to ambulate as much as possible.

## 2017-11-05 LAB
ANION GAP SERPL CALC-SCNC: 10 MMOL/L (ref 7–16)
BUN SERPL-MCNC: 14 MG/DL (ref 8–23)
CALCIUM SERPL-MCNC: 8.2 MG/DL (ref 8.3–10.4)
CHLORIDE SERPL-SCNC: 105 MMOL/L (ref 98–107)
CO2 SERPL-SCNC: 25 MMOL/L (ref 21–32)
CREAT SERPL-MCNC: 1.57 MG/DL (ref 0.6–1)
ERYTHROCYTE [DISTWIDTH] IN BLOOD BY AUTOMATED COUNT: 13.5 % (ref 11.9–14.6)
GLUCOSE SERPL-MCNC: 102 MG/DL (ref 65–100)
HCT VFR BLD AUTO: 32 % (ref 35.8–46.3)
HGB BLD-MCNC: 10.1 G/DL (ref 11.7–15.4)
MAGNESIUM SERPL-MCNC: 1.9 MG/DL (ref 1.8–2.4)
MCH RBC QN AUTO: 28.3 PG (ref 26.1–32.9)
MCHC RBC AUTO-ENTMCNC: 31.6 G/DL (ref 31.4–35)
MCV RBC AUTO: 89.6 FL (ref 79.6–97.8)
PLATELET # BLD AUTO: 159 K/UL (ref 150–450)
PMV BLD AUTO: 9.9 FL (ref 10.8–14.1)
POTASSIUM SERPL-SCNC: 4 MMOL/L (ref 3.5–5.1)
RBC # BLD AUTO: 3.57 M/UL (ref 4.05–5.25)
SODIUM SERPL-SCNC: 140 MMOL/L (ref 136–145)
WBC # BLD AUTO: 10.2 K/UL (ref 4.3–11.1)

## 2017-11-05 PROCEDURE — 74011250637 HC RX REV CODE- 250/637: Performed by: UROLOGY

## 2017-11-05 PROCEDURE — 80048 BASIC METABOLIC PNL TOTAL CA: CPT | Performed by: UROLOGY

## 2017-11-05 PROCEDURE — 85027 COMPLETE CBC AUTOMATED: CPT | Performed by: UROLOGY

## 2017-11-05 PROCEDURE — 65270000029 HC RM PRIVATE

## 2017-11-05 PROCEDURE — 36415 COLL VENOUS BLD VENIPUNCTURE: CPT | Performed by: UROLOGY

## 2017-11-05 PROCEDURE — 94760 N-INVAS EAR/PLS OXIMETRY 1: CPT

## 2017-11-05 PROCEDURE — 83735 ASSAY OF MAGNESIUM: CPT | Performed by: UROLOGY

## 2017-11-05 PROCEDURE — 74011258636 HC RX REV CODE- 258/636: Performed by: UROLOGY

## 2017-11-05 RX ORDER — FACIAL-BODY WIPES
10 EACH TOPICAL ONCE
Status: COMPLETED | OUTPATIENT
Start: 2017-11-05 | End: 2017-11-05

## 2017-11-05 RX ADMIN — Medication 10 ML: at 15:35

## 2017-11-05 RX ADMIN — SODIUM CHLORIDE, SODIUM LACTATE, POTASSIUM CHLORIDE, CALCIUM CHLORIDE, AND DEXTROSE MONOHYDRATE 75 ML/HR: 600; 310; 30; 20; 5 INJECTION, SOLUTION INTRAVENOUS at 15:37

## 2017-11-05 RX ADMIN — ACETAMINOPHEN 650 MG: 325 TABLET ORAL at 22:51

## 2017-11-05 RX ADMIN — Medication 10 ML: at 20:02

## 2017-11-05 RX ADMIN — LOSARTAN POTASSIUM 50 MG: 50 TABLET ORAL at 09:06

## 2017-11-05 RX ADMIN — TRIHEXYPHENIDYL HYDROCHLORIDE 10 MG: 2 TABLET ORAL at 16:36

## 2017-11-05 RX ADMIN — BISACODYL 10 MG: 10 SUPPOSITORY RECTAL at 09:06

## 2017-11-05 RX ADMIN — PRAMIPEXOLE DIHYDROCHLORIDE 0.25 MG: 0.25 TABLET ORAL at 09:06

## 2017-11-05 RX ADMIN — OXYCODONE HYDROCHLORIDE AND ACETAMINOPHEN 1 TABLET: 7.5; 325 TABLET ORAL at 04:56

## 2017-11-05 RX ADMIN — CHLORASEPTIC 1 SPRAY: 1.5 LIQUID ORAL at 09:06

## 2017-11-05 RX ADMIN — DOCUSATE SODIUM 100 MG: 100 CAPSULE, LIQUID FILLED ORAL at 16:36

## 2017-11-05 RX ADMIN — ATENOLOL 50 MG: 50 TABLET ORAL at 20:00

## 2017-11-05 RX ADMIN — Medication 10 ML: at 06:06

## 2017-11-05 RX ADMIN — TRIHEXYPHENIDYL HYDROCHLORIDE 10 MG: 2 TABLET ORAL at 09:06

## 2017-11-05 RX ADMIN — PRAMIPEXOLE DIHYDROCHLORIDE 0.25 MG: 0.25 TABLET ORAL at 16:35

## 2017-11-05 RX ADMIN — DOCUSATE SODIUM 100 MG: 100 CAPSULE, LIQUID FILLED ORAL at 09:06

## 2017-11-05 NOTE — PROGRESS NOTES
Pt experienced one episode of vomiting this AM according to family. She has been  voiding the first two times were between  cc's of urine.  Two more episodes of 300 total.

## 2017-11-05 NOTE — PROGRESS NOTES
Intake/Output Summary (Last 24 hours) at 11/05/17 0617  Last data filed at 11/05/17 0615   Gross per 24 hour   Intake             1500 ml   Output             1425 ml   Net               75 ml       Hourly rounds completed during 12 hour shift. No changes noted during shift. Will continue to monitor until shift change.

## 2017-11-05 NOTE — PROGRESS NOTES
Intake/Output Summary (Last 24 hours) at 11/04/17 2224  Last data filed at 11/04/17 2013   Gross per 24 hour   Intake                0 ml   Output             2025 ml   Net            -2025 ml

## 2017-11-06 LAB
ABO + RH BLD: NORMAL
BLD PROD TYP BPU: NORMAL
BLD PROD TYP BPU: NORMAL
BLOOD GROUP ANTIBODIES SERPL: NORMAL
BPU ID: NORMAL
BPU ID: NORMAL
CROSSMATCH RESULT,%XM: NORMAL
CROSSMATCH RESULT,%XM: NORMAL
SPECIMEN EXP DATE BLD: NORMAL
STATUS OF UNIT,%ST: NORMAL
STATUS OF UNIT,%ST: NORMAL
UNIT DIVISION, %UDIV: 0
UNIT DIVISION, %UDIV: 0

## 2017-11-06 PROCEDURE — 74011250637 HC RX REV CODE- 250/637: Performed by: UROLOGY

## 2017-11-06 PROCEDURE — 65270000029 HC RM PRIVATE

## 2017-11-06 PROCEDURE — 74011258636 HC RX REV CODE- 258/636: Performed by: UROLOGY

## 2017-11-06 RX ADMIN — DOCUSATE SODIUM 100 MG: 100 CAPSULE, LIQUID FILLED ORAL at 08:44

## 2017-11-06 RX ADMIN — Medication 10 ML: at 02:35

## 2017-11-06 RX ADMIN — LOSARTAN POTASSIUM 50 MG: 50 TABLET ORAL at 08:43

## 2017-11-06 RX ADMIN — PANTOPRAZOLE SODIUM 40 MG: 40 TABLET, DELAYED RELEASE ORAL at 05:42

## 2017-11-06 RX ADMIN — ACETAMINOPHEN 650 MG: 325 TABLET ORAL at 22:20

## 2017-11-06 RX ADMIN — PRAMIPEXOLE DIHYDROCHLORIDE 0.25 MG: 0.25 TABLET ORAL at 17:27

## 2017-11-06 RX ADMIN — ACETAMINOPHEN 650 MG: 325 TABLET ORAL at 08:43

## 2017-11-06 RX ADMIN — LEVOTHYROXINE SODIUM 100 MCG: 100 TABLET ORAL at 06:36

## 2017-11-06 RX ADMIN — SODIUM CHLORIDE, SODIUM LACTATE, POTASSIUM CHLORIDE, CALCIUM CHLORIDE, AND DEXTROSE MONOHYDRATE 75 ML/HR: 600; 310; 30; 20; 5 INJECTION, SOLUTION INTRAVENOUS at 04:57

## 2017-11-06 RX ADMIN — DOCUSATE SODIUM 100 MG: 100 CAPSULE, LIQUID FILLED ORAL at 17:27

## 2017-11-06 RX ADMIN — ACETAMINOPHEN 650 MG: 325 TABLET ORAL at 17:27

## 2017-11-06 RX ADMIN — Medication 10 ML: at 20:56

## 2017-11-06 RX ADMIN — ZOLPIDEM TARTRATE 5 MG: 5 TABLET ORAL at 21:01

## 2017-11-06 RX ADMIN — Medication 5 ML: at 05:40

## 2017-11-06 RX ADMIN — PRAMIPEXOLE DIHYDROCHLORIDE 0.25 MG: 0.25 TABLET ORAL at 08:43

## 2017-11-06 RX ADMIN — ATENOLOL 50 MG: 50 TABLET ORAL at 20:55

## 2017-11-06 RX ADMIN — TRIHEXYPHENIDYL HYDROCHLORIDE 10 MG: 2 TABLET ORAL at 17:26

## 2017-11-06 RX ADMIN — TRIHEXYPHENIDYL HYDROCHLORIDE 10 MG: 2 TABLET ORAL at 08:43

## 2017-11-06 RX ADMIN — PANTOPRAZOLE SODIUM 40 MG: 40 TABLET, DELAYED RELEASE ORAL at 06:36

## 2017-11-06 RX ADMIN — LEVOTHYROXINE SODIUM 100 MCG: 100 TABLET ORAL at 05:42

## 2017-11-06 RX ADMIN — Medication 10 ML: at 17:33

## 2017-11-06 NOTE — PROGRESS NOTES
END OF SHIFT NOTE:    INTAKE/OUTPUT  11/05 0701 - 11/06 0700  In: -   Out: 502 [Urine:502]  Voiding: YES  Catheter: NO  Color: clear  Drain:              DIET  regular    Flatus: Patient does have flatus present. Stool:  0 occurrences. Characteristics:  Stool Assessment  Stool Appearance: Watery    Ambulating  yes    Emesis: 0 occurrences.     Characteristics:          VITAL SIGNS  Patient Vitals for the past 12 hrs:   Temp Pulse Resp BP SpO2   11/06/17 1504 98.2 °F (36.8 °C) 79 18 137/69 97 %   11/06/17 1127 97.2 °F (36.2 °C) 76 18 161/81 96 %   11/06/17 0758 98.6 °F (37 °C) 77 16 (!) 159/98 97 %       Pain Assessment  Pain Intensity 1: 0 (11/06/17 0700)  Pain Location 1: Abdomen  Pain Intervention(s) 1: Medication (see MAR)  Patient Stated Pain Goal: 0            Morgan Joseph RN

## 2017-11-06 NOTE — PROGRESS NOTES
Post op confusion. Patient lives with her spouse and has four children. She is ambulatory in the room but unclear as to exactly what is going on. Knows she is in a hospital. Pulled out her IV. Spouse sat with her all day. Daughter present now. Tommy Chatman

## 2017-11-06 NOTE — PROGRESS NOTES
Admit Date: 11/3/2017    Subjective:     Ms. Annmarie Galindo is doing okay this AM, she is now passing flatus and had a liquid BM. No nausea. She is voiding well. Objective:     Patient Vitals for the past 8 hrs:   BP Temp Pulse Resp SpO2   11/06/17 0758 (!) 159/98 98.6 °F (37 °C) 77 16 97 %   11/06/17 0436 127/71 98.1 °F (36.7 °C) 74 18 97 %     11/06 0701 - 11/06 1900  In: 240 [P.O.:240]  Out: -   11/04 1901 - 11/06 0700  In: 1500 [I.V.:1500]  Out: 1902 [DRQPS:8542]    Physical Exam:  GENERAL: alert, cooperative, no distress  LUNG: clear to auscultation bilaterally  HEART: regular rate and rhythm, S1, S2   ABDOMEN: soft, non-tender. Staples c/d/i. Bowel sounds active. NEUROLOGIC: AOx3. Data Review No results found for this or any previous visit (from the past 24 hour(s)). Assessment:     Active Problems:    Left renal mass (11/3/2017)      Renal cell cancer, left (HCC) (11/3/2017)      Overview: Nephrectomy 11/3/17    POD 3 s/p LEFT hand assisted laparoscopic nephrectomy    Plan:     Advance to regular diet. Up to chair. BMP cruz AM.     Continue IVF. Hopefully home tomorrow if doing well with regular diet. Gypsy Gallagher NP      Patient slow to get going after surgery but had a bowel movement and is tolerating sips of liquids. Will advance to regular diet and check a BMP in the morning. I have reviewed the above note and examined the patient. I agree with the exam, assessment and plan.     Xuan Edward MD

## 2017-11-06 NOTE — PROGRESS NOTES
Pt. Found coming out of room with blood on gown and dripping out of IV. Cap from IV found disconnecting. No family at bedside. Clean up pt., attempted to reorient pt. Difficult to orient opt. To place. Daughter came in room and was able to reorient pt. And get her back to bed.

## 2017-11-07 VITALS
HEIGHT: 63 IN | SYSTOLIC BLOOD PRESSURE: 172 MMHG | WEIGHT: 148.5 LBS | RESPIRATION RATE: 18 BRPM | TEMPERATURE: 97.9 F | BODY MASS INDEX: 26.31 KG/M2 | HEART RATE: 69 BPM | OXYGEN SATURATION: 95 % | DIASTOLIC BLOOD PRESSURE: 86 MMHG

## 2017-11-07 LAB
ANION GAP SERPL CALC-SCNC: 9 MMOL/L (ref 7–16)
BUN SERPL-MCNC: 11 MG/DL (ref 8–23)
CALCIUM SERPL-MCNC: 9 MG/DL (ref 8.3–10.4)
CHLORIDE SERPL-SCNC: 107 MMOL/L (ref 98–107)
CO2 SERPL-SCNC: 27 MMOL/L (ref 21–32)
CREAT SERPL-MCNC: 1.61 MG/DL (ref 0.6–1)
GLUCOSE SERPL-MCNC: 96 MG/DL (ref 65–100)
POTASSIUM SERPL-SCNC: 3.4 MMOL/L (ref 3.5–5.1)
SODIUM SERPL-SCNC: 143 MMOL/L (ref 136–145)

## 2017-11-07 PROCEDURE — 36415 COLL VENOUS BLD VENIPUNCTURE: CPT

## 2017-11-07 PROCEDURE — 74011250637 HC RX REV CODE- 250/637: Performed by: UROLOGY

## 2017-11-07 PROCEDURE — 80048 BASIC METABOLIC PNL TOTAL CA: CPT

## 2017-11-07 PROCEDURE — 77030018836 HC SOL IRR NACL ICUM -A

## 2017-11-07 RX ADMIN — LOSARTAN POTASSIUM 50 MG: 50 TABLET ORAL at 09:36

## 2017-11-07 RX ADMIN — PRAMIPEXOLE DIHYDROCHLORIDE 0.25 MG: 0.25 TABLET ORAL at 09:36

## 2017-11-07 RX ADMIN — DOCUSATE SODIUM 100 MG: 100 CAPSULE, LIQUID FILLED ORAL at 09:36

## 2017-11-07 RX ADMIN — PANTOPRAZOLE SODIUM 40 MG: 40 TABLET, DELAYED RELEASE ORAL at 06:13

## 2017-11-07 RX ADMIN — TRIHEXYPHENIDYL HYDROCHLORIDE 10 MG: 2 TABLET ORAL at 09:35

## 2017-11-07 RX ADMIN — Medication 10 ML: at 06:14

## 2017-11-07 RX ADMIN — LEVOTHYROXINE SODIUM 100 MCG: 100 TABLET ORAL at 06:13

## 2017-11-07 NOTE — PROGRESS NOTES
Admit Date: 11/3/2017    Subjective:     Ms. Leslie Biggs is sitting in bed with family at bedside. Nursing and patient's  report she had episodes of confusion yesterday afternoon and last night, pulling her clothes off and her IV out. Objective:     Patient Vitals for the past 8 hrs:   BP Temp Pulse Resp SpO2   11/07/17 0423 149/83 98.6 °F (37 °C) 65 20 96 %        11/05 1901 - 11/07 0700  In: 1879 [P.O.:600; I.V.:1279]  Out: 1250 [Urine:1250]    Physical Exam:  GENERAL: alert, oriented, cooperative, no distress  LUNG: clear to auscultation bilaterally  HEART: regular rate and rhythm, S1, S2   ABDOMEN: soft, non-tender, staples c/d/i, bowel sounds present  NEUROLOGIC: AOx3     Data Review   Recent Results (from the past 24 hour(s))   METABOLIC PANEL, BASIC    Collection Time: 11/07/17  4:53 AM   Result Value Ref Range    Sodium 143 136 - 145 mmol/L    Potassium 3.4 (L) 3.5 - 5.1 mmol/L    Chloride 107 98 - 107 mmol/L    CO2 27 21 - 32 mmol/L    Anion gap 9 7 - 16 mmol/L    Glucose 96 65 - 100 mg/dL    BUN 11 8 - 23 MG/DL    Creatinine 1.61 (H) 0.6 - 1.0 MG/DL    GFR est AA 41 (L) >60 ml/min/1.73m2    GFR est non-AA 34 (L) >60 ml/min/1.73m2    Calcium 9.0 8.3 - 10.4 MG/DL       Assessment:     Active Problems:    Left renal mass (11/3/2017)      Renal cell cancer, left ([facility] [address] (11/3/2017)      Overview: Nephrectomy 11/3/17    POD 4 s/p LEFT hand assisted laparoscopic nephrectomy       Plan:     Ms. Leslie Biggs is alert and oriented this AM, she is voiding well, has no nausea and tolerating a regular diet. Abdomen is soft and staples c/d/i. She had an episode of confusion yesterday and was reoriented once she saw her daughter. She had a dose of ambien last night, to which she normally takes restoril at home (maybe twice per month, per ). After taking this, she had episodes of confusion.       Cn is 1.61 this AM.     She will be discharged home today and to return next week with NP for staple removal. Cirilo Pappas NP    I have reviewed the above note and examined the patient. I agree with the exam, assessment and plan.     Latanya Jones MD

## 2017-11-07 NOTE — DISCHARGE SUMMARY
.  Discharge Summary     Patient: Kristine Paez MRN: [id number]  SSN: [id number]    YOB: 1950  Age: 69 y.o. Sex: female      Admit Date: 11/3/2017    Discharge Date: 2017     * Admission Diagnoses:  Renal mass, left [N28.89]  Renal cell cancer, left ([facility] [address] [C64.2]     * Discharge Diagnoses:   Hospital Problems as of 2017  Date Reviewed: 2017          Codes Class Noted - Resolved POA    Left renal mass ICD-10-CM: N28.89  ICD-9-CM: 593.9  11/3/2017 - Present Unknown        Renal cell cancer, left ([address] ICD-10-CM: C64.2  ICD-9-CM: 189.0  11/3/2017 - Present Unknown    Overview Signed 11/3/2017 10:21 AM by Perfecto Ramirez MD     Nephrectomy 11/3/17                  PATH REPORT:      Name:  Ashwin Wong  Accession Number:  [id number]    :  1950    MR #:  [id number]  Physician: JESUS ALBERTO GLASER       DIAGNOSIS   LEFT KIDNEY: CLEAR CELL RENAL CELL CARCINOMA, LUIS GRADE 3,   2.8 CM, CONFINED TO THE KIDNEY. Electronically signed out on 2017 14:20 by Kacy Wade M.D., Ph.D.         * Procedures for this admission:   Procedure(s):  LEFT RADICAL NEPHRECTOMY LAPAROSCOPIC HAND ASSISTED        * Disposition: Home    * Discharged Condition: good and improved    * Hospital Course:      Mrs. Chika Portillo is a 38-year-old female who underwent a hand-assisted left radical nephrectomy on 2017 with Dr. Severa Art. Prior to this, she had a central renal mass that was biopsied and showed clear cell carcinoma of the kidney. She then elected to have the above named procedure. She had a slow progression after surgery with no flatus for a couple of days and was strongly encouraged to ambulate. She did void well after wilson removal. By POD 3, she was able to pass flatus and had a BM, her diet was advanced and she has since tolerated regular food with no nausea and no problems. Abdomen is soft with active BS and she feels better. She will be discharged home.   She says she has medication at home and does not need pain medication. She had a couple of episodes of confusion yesterday when she pulled her IV out and took off her gown and was very disoriented. She had an [place] last PM, to which she takes restoril at home (only twice per month, per ). This is felt to be contributory to her confusion. Her  does report that she has similar episodes like this at home. She has a hx of parkinsons. An appt has been made with her PCP for evaluation of this confusion. Patient Active Problem List   Diagnosis Code    Dystonic tremor G25.2    Hypothyroidism E03.9    GERD (gastroesophageal reflux disease) K21.9    Hypertension I10    Parkinson disease ([facility] [address] G20    Left renal mass N28.89    Renal cell cancer, left ([facility] [address] C64.2         Discharge Medications:   Current Discharge Medication List      CONTINUE these medications which have NOT CHANGED    Details   atenolol (TENORMIN) 50 mg tablet Take 1 Tab by mouth daily. Qty: 90 Tab, Refills: 1    Associated Diagnoses: Essential hypertension      losartan (COZAAR) 50 mg tablet Take 1 Tab by mouth daily. Qty: 90 Tab, Refills: 1    Associated Diagnoses: Essential hypertension      levothyroxine (SYNTHROID) 100 mcg tablet Take 1 Tab by mouth Daily (before breakfast). Indications: hypothyroidism  Qty: 90 Tab, Refills: 1    Associated Diagnoses: Hypothyroidism due to acquired atrophy of thyroid      omeprazole (PRILOSEC) 40 mg capsule Take 1 Cap by mouth daily. Qty: 90 Cap, Refills: 3      pramipexole (MIRAPEX) 0.25 mg tablet Take 0.25 mg by mouth two (2) times a day. trihexyphenidyl (ARTANE) 5 mg tablet Take 10 mg by mouth two (2) times a day. Per Dr. Romero All for tremor       acetaminophen (TYLENOL) 325 mg tablet Take 325 mg by mouth as needed for Pain. STOP taking these medications       temazepam (RESTORIL) 15 mg capsule Comments:   Reason for Stopping:                * Follow-up Care/Patient Instructions:   Activity: Activity as tolerated  Diet: Regular Diet  Wound Care: As directed, staple removal next week    Follow-up Information     Follow up With Details Comments Contact Info    Jagdeep Kennedy MD In 1 week Had episode of confusion here in hospital.   reports this is not new, needs eval/treatment for dementia (hx of parkinsons)  [id number] [facility] [phone]             Signed By: Deepthi Nair NP     November 7, 2017

## 2017-11-07 NOTE — PROGRESS NOTES
Care Management Interventions  PCP Verified by CM: Yes  Transition of Care Consult (CM Consult): Discharge Planning  Current Support Network: Lives with Spouse  Confirm Follow Up Transport: Family  Plan discussed with Pt/Family/Caregiver: Yes  Freedom of Choice Offered: Yes  Discharge Location  Discharge Placement: Home     DC to home today with spouse and supportive daughter. No needs. Mental status more clear and patient expected to continue to improve being home and in familiar environment.    Rangeley Inch

## 2017-11-07 NOTE — PROGRESS NOTES
Postop day #4 status post hand-assisted left radical nephrectomy for renal cell carcinoma. Patient confused from her sleeping pill that she took last night. Patient tolerating diet and having flatus she could be discharged home she probably needs follow-up with her primary care doctor to evaluate her confusion. She got a Ambien generally she only takes a sleeping pill every once in a while.     We will follow-up in 1 week for clip removal.    Darrian ROBERTS

## 2017-11-07 NOTE — DISCHARGE INSTRUCTIONS
DISCHARGE SUMMARY from Nurse    PATIENT INSTRUCTIONS:    After general anesthesia or intravenous sedation, for 24 hours or while taking prescription Narcotics:  · Limit your activities  · Do not drive and operate hazardous machinery  · Do not make important personal or business decisions  · Do  not drink alcoholic beverages  · If you have not urinated within 8 hours after discharge, please contact your surgeon on call. Report the following to your surgeon:  · Excessive pain, swelling, redness or odor of or around the surgical area  · Temperature over 100.5  · Nausea and vomiting lasting longer than 4 hours or if unable to take medications  · Any signs of decreased circulation or nerve impairment to extremity: change in color, persistent  numbness, tingling, coldness or increase pain  · Any questions    What to do at Home:  Recommended activity: No lifting, Driving, or Strenuous exercise for 2 weeks    If you experience any of the following symptoms increased pain, fever greater than 101, nausea/vomiting, or excessive wound drianage, please follow up with Dr. Darlene Duran  Keep incision clean and dry. May shower. *  Please give a list of your current medications to your Primary Care Provider. *  Please update this list whenever your medications are discontinued, doses are      changed, or new medications (including over-the-counter products) are added. *  Please carry medication information at all times in case of emergency situations. These are general instructions for a healthy lifestyle:    No smoking/ No tobacco products/ Avoid exposure to second hand smoke  Surgeon General's Warning:  Quitting smoking now greatly reduces serious risk to your health.     Obesity, smoking, and sedentary lifestyle greatly increases your risk for illness    A healthy diet, regular physical exercise & weight monitoring are important for maintaining a healthy lifestyle    You may be retaining fluid if you have a history of heart failure or if you experience any of the following symptoms:  Weight gain of 3 pounds or more overnight or 5 pounds in a week, increased swelling in our hands or feet or shortness of breath while lying flat in bed. Please call your doctor as soon as you notice any of these symptoms; do not wait until your next office visit. Recognize signs and symptoms of STROKE:    F-face looks uneven    A-arms unable to move or move unevenly    S-speech slurred or non-existent    T-time-call 911 as soon as signs and symptoms begin-DO NOT go       Back to bed or wait to see if you get better-TIME IS BRAIN. Warning Signs of HEART ATTACK     Call 911 if you have these symptoms:   Chest discomfort. Most heart attacks involve discomfort in the center of the chest that lasts more than a few minutes, or that goes away and comes back. It can feel like uncomfortable pressure, squeezing, fullness, or pain.  Discomfort in other areas of the upper body. Symptoms can include pain or discomfort in one or both arms, the back, neck, jaw, or stomach.  Shortness of breath with or without chest discomfort.  Other signs may include breaking out in a cold sweat, nausea, or lightheadedness. Don't wait more than five minutes to call 911 - MINUTES MATTER! Fast action can save your life. Calling 911 is almost always the fastest way to get lifesaving treatment. Emergency Medical Services staff can begin treatment when they arrive -- up to an hour sooner than if someone gets to the hospital by car. The discharge information has been reviewed with the patient. The patient verbalized understanding. Discharge medications reviewed with the patient and appropriate educational materials and side effects teaching were provided.   ___________________________________________________________________________________________________________________________________     Laparoscopic Nephrectomy: What to Expect at [address] nephrectomy is surgery to take out part or all of the kidney. One or both kidneys may be taken out. Sometimes other tissue near the kidney is taken out at the same time. Your belly will feel sore after the surgery. This usually lasts about 1 to 2 weeks. Your doctor will give you pain medicine for this. You may also have other symptoms such as nausea, diarrhea, constipation, gas, or a headache. At first, you may have low energy and get tired quickly. It may take 3 to 6 months for your energy to fully return. Your body can work fine with one healthy kidney. If both kidneys are removed or your remaining kidney is not healthy, your doctor will talk to you about the kind of treatment you will need after surgery. This care sheet gives you a general idea about how long it will take for you to recover. But each person recovers at a different pace. Follow the steps below to get better as quickly as possible. How can you care for yourself at home? Activity  ? · Rest when you feel tired. Getting enough sleep will help you recover. ? · Try to walk each day. Start by walking a little more than you did the day before. Bit by bit, increase the amount you walk. Walking boosts blood flow and helps prevent pneumonia and constipation. ? · Avoid exercises that use your belly muscles and strenuous activities such as bicycle riding, jogging, weight lifting, or aerobic exercise until your doctor says it is okay. ? · For at least 4 weeks, avoid lifting anything that would make you strain. This may include a child, heavy grocery bags and milk containers, a heavy briefcase or backpack, cat litter or dog food bags, or a vacuum . ? · Hold a pillow over the cuts the doctor made (incisions) when you cough or take deep breaths. This will support your belly and decrease your pain. ? · Do breathing exercises at home as instructed by your doctor. This will help prevent pneumonia. ? · Ask your doctor when you can drive again. ? · You will probably need to take 4 to 6 weeks off from work. It depends on the type of work you do and how you feel. ? · You may be able to take showers (unless you have a drainage tube near your incisions). If you have a drainage tube, follow your doctor's instructions to empty and care for it. Do not take a bath for the first 2 weeks, or until your doctor tells you it is okay. ? · Ask your doctor when it is okay for you to have sex. Diet  ? · You can eat your normal diet. If you were on a special diet for your kidneys before surgery, follow that diet until your doctor tells you to stop. ? · If your stomach is upset, try bland, low-fat foods like plain rice, broiled chicken, toast, and yogurt. ? · Drink plenty of fluids (unless your doctor tells you not to). ? · You may notice that your bowel movements are not regular right after your surgery. This is common. Try to avoid constipation and straining with bowel movements. You may want to take a fiber supplement every day. If you have not had a bowel movement after a couple of days, ask your doctor about taking a mild laxative. Medicines  ? · Your doctor will tell you if and when you can restart your medicines. He or she will also give you instructions about taking any new medicines. ? · If you take blood thinners, such as warfarin (Coumadin), clopidogrel (Plavix), or aspirin, be sure to talk to your doctor. He or she will tell you if and when to start taking those medicines again. Make sure that you understand exactly what your doctor wants you to do. ? · Take pain medicines exactly as directed. ¨ If the doctor gave you a prescription medicine for pain, take it as prescribed. ¨ If you are not taking a prescription pain medicine, take an over-the-counter medicine that your doctor recommends. Read and follow all instructions on the label.   ¨ Do not take aspirin, ibuprofen (Advil, Motrin), or naproxen (Aleve), or other nonsteroidal anti-inflammatory drugs (NSAIDs) unless your doctor says it is okay. ? · If you think your pain medicine is making you sick to your stomach:  ¨ Take your medicine after meals (unless your doctor has told you not to). ¨ Ask your doctor for a different pain medicine. ? · If your doctor prescribed antibiotics, take them as directed. Do not stop taking them just because you feel better. You need to take the full course of antibiotics. Incision care  ? · If you have strips of tape on the incisions, leave the tape on for a week or until it falls off.   ? · Wash the area around the incisions daily with warm, soapy water and pat it dry. Don't use hydrogen peroxide or alcohol, which can slow healing. You may cover the incisions with gauze bandages if they weep or rub against clothing. Change the bandages every day. ? · Keep the area around the incisions clean and dry. Follow-up care is a key part of your treatment and safety. Be sure to make and go to all appointments, and call your doctor if you are having problems. It's also a good idea to know your test results and keep a list of the medicines you take. When should you call for help? Call 911 anytime you think you may need emergency care. For example, call if:  ? · You passed out (lost consciousness). ? · You have chest pain, are short of breath, or cough up blood. ?Call your doctor now or seek immediate medical care if:  ? · You have pain that does not get better after you take your pain medicine. ? · You have symptoms of a urinary tract infection. These may include:  ¨ Pain or burning when you urinate. ¨ A frequent need to urinate without being able to pass much urine. ¨ Pain in the flank, which is just below the rib cage and above the waist on either side of the back. ¨ Blood in the urine. ¨ A fever. ? · You have signs of infection, such as:  ¨ Increased pain, swelling, warmth, or redness. ¨ Red streaks leading from the incisions.   ¨ Pus draining from the incisions. ¨ A fever. ? · You have loose stitches, or your incisions come open. ? · You are bleeding from the incisions. ? · You cannot urinate. ? · You are sick to your stomach or cannot drink fluids. ? · You have signs of a blood clot in your leg (called a deep vein thrombosis), such as:  ¨ Pain in the calf, back of the knee, thigh, or groin. ¨ Redness and swelling in your leg. ? Watch closely for changes in your health, and be sure to contact your doctor if you are having any problems. Where can you learn more? Go to http://eneida-judy.info/. Enter [id number] [id number] in the search box to learn more about \"Laparoscopic Nephrectomy: What to Expect at Home. \"  Current as of: May 12, 2017  Content Version: 11.4  © 4178-9751 [employer]. Care instructions adapted under license by [employer] (which disclaims liability or warranty for this information). If you have questions about a medical condition or this instruction, always ask your healthcare professional. [place] [address] any warranty or liability for your use of this information.

## 2017-12-19 PROBLEM — R35.0 URINARY FREQUENCY: Status: ACTIVE | Noted: 2017-12-19

## 2018-03-07 PROBLEM — N28.89 LEFT RENAL MASS: Status: RESOLVED | Noted: 2017-11-03 | Resolved: 2018-03-07

## 2018-03-07 PROBLEM — R35.0 URINARY FREQUENCY: Status: RESOLVED | Noted: 2017-12-19 | Resolved: 2018-03-07

## 2018-03-07 PROBLEM — R41.3 MEMORY PROBLEM: Status: ACTIVE | Noted: 2017-11-14

## 2018-04-26 ENCOUNTER — HOSPITAL ENCOUNTER (OUTPATIENT)
Dept: CT IMAGING | Age: 68
Discharge: HOME | End: 2018-04-26
Attending: UROLOGY
Payer: MEDICARE

## 2018-04-26 VITALS — WEIGHT: 146 LBS | BODY MASS INDEX: 25.87 KG/M2 | HEIGHT: 63 IN

## 2018-04-26 DIAGNOSIS — C64.2 RENAL CELL CANCER, LEFT (HCC): ICD-10-CM

## 2018-04-26 DIAGNOSIS — R35.0 URINARY FREQUENCY: ICD-10-CM

## 2018-04-26 LAB — CREAT BLD-MCNC: 2 MG/DL (ref 0.8–1.5)

## 2018-04-26 PROCEDURE — 82565 ASSAY OF CREATININE: CPT

## 2018-04-26 PROCEDURE — 74176 CT ABD & PELVIS W/O CONTRAST: CPT

## 2018-07-03 PROBLEM — M79.2 NEURALGIA: Status: ACTIVE | Noted: 2018-07-03

## 2018-07-03 PROBLEM — G47.52 REM SLEEP BEHAVIOR DISORDER: Status: ACTIVE | Noted: 2018-03-22

## 2019-01-14 ENCOUNTER — HOSPITAL ENCOUNTER (OUTPATIENT)
Dept: GENERAL RADIOLOGY | Age: 69
Discharge: HOME | End: 2019-01-14
Attending: INTERNAL MEDICINE
Payer: MEDICARE

## 2019-01-14 DIAGNOSIS — M25.552 CHRONIC LEFT HIP PAIN: ICD-10-CM

## 2019-01-14 DIAGNOSIS — G89.29 CHRONIC LEFT HIP PAIN: ICD-10-CM

## 2019-01-14 PROCEDURE — 73502 X-RAY EXAM HIP UNI 2-3 VIEWS: CPT

## 2019-05-06 ENCOUNTER — HOSPITAL ENCOUNTER (OUTPATIENT)
Dept: ULTRASOUND IMAGING | Age: 69
Discharge: HOME | End: 2019-05-06
Attending: UROLOGY

## 2019-05-06 DIAGNOSIS — C64.2 RENAL CELL CANCER, LEFT (HCC): ICD-10-CM

## 2019-09-03 ENCOUNTER — HOSPITAL ENCOUNTER (OUTPATIENT)
Dept: MAMMOGRAPHY | Age: 69
Discharge: HOME | End: 2019-09-03
Attending: FAMILY MEDICINE
Payer: MEDICARE

## 2019-09-03 DIAGNOSIS — Z13.820 SCREENING FOR OSTEOPOROSIS: ICD-10-CM

## 2019-09-03 DIAGNOSIS — Z78.0 POSTMENOPAUSAL: ICD-10-CM

## 2019-09-03 PROCEDURE — 77080 DXA BONE DENSITY AXIAL: CPT

## 2019-09-05 NOTE — PROGRESS NOTES
Per Dr. Mp Rubio: Indicates Osteopenia. Recommend OTC Calcium+Vitamin D3 and exercise. Recheck in 2 years. - Notified, verbalizes understanding.

## 2022-02-02 ENCOUNTER — TRANSCRIBE ORDER (OUTPATIENT)
Dept: SCHEDULING | Age: 72
End: 2022-02-02

## 2022-02-02 DIAGNOSIS — N18.4 CHRONIC KIDNEY DISEASE, STAGE IV (SEVERE) (HCC): Primary | ICD-10-CM

## 2022-03-18 PROBLEM — M79.2 NEURALGIA: Status: ACTIVE | Noted: 2018-07-03

## 2022-03-19 PROBLEM — G47.52 REM SLEEP BEHAVIOR DISORDER: Status: ACTIVE | Noted: 2018-03-22

## 2022-03-19 PROBLEM — G20.A1 PARKINSON DISEASE: Status: ACTIVE | Noted: 2017-07-28

## 2022-03-19 PROBLEM — R41.3 MEMORY PROBLEM: Status: ACTIVE | Noted: 2017-11-14

## 2022-03-19 PROBLEM — G20 PARKINSON DISEASE (HCC): Status: ACTIVE | Noted: 2017-07-28

## 2022-03-19 PROBLEM — C64.2 RENAL CELL CANCER, LEFT (HCC): Status: ACTIVE | Noted: 2017-11-03

## 2022-04-14 ENCOUNTER — NURSE TRIAGE (OUTPATIENT)
Dept: OTHER | Facility: CLINIC | Age: 72
End: 2022-04-14

## 2022-04-14 NOTE — TELEPHONE ENCOUNTER
Received call from Tatyana Reese  48. at [facility] with Red Flag Complaint. Subjective: Caller states \"My left leg has pain from hip to the knee. I also have pain in my R heel that causes numbness which makes me hardly be able to walk. \"     Current Symptoms: pain with walking, intermittent numbness of R heel     Onset: 1 week ago; worsening    Associated Symptoms: reduced activity    Pain Severity: 8/10; aching; intermittent    Temperature: Denies   What has been tried: Denies     LMP: NA Pregnant: NA    Recommended disposition: See in Office Today or Tomorrow    Care advice provided, patient verbalizes understanding; denies any other questions or concerns; instructed to call back for any new or worsening symptoms. Patient/Caller agrees with recommended disposition; writer provided warm transfer to Kaitlyn at [facility] for appointment scheduling    Attention Provider: Thank you for allowing me to participate in the care of your patient. The patient was connected to triage in response to information provided to the [facility]. Please do not respond through this encounter as the response is not directed to a shared pool.       Reason for Disposition   Numbness in a leg or foot (i.e., loss of sensation)    Protocols used: LEG PAIN-ADULT-[place]

## 2022-07-19 DIAGNOSIS — R60.9 EDEMA, UNSPECIFIED: ICD-10-CM

## 2022-07-19 RX ORDER — TRIAMTERENE AND HYDROCHLOROTHIAZIDE 75; 50 MG/1; MG/1
TABLET ORAL
Qty: 90 TABLET | Refills: 1 | OUTPATIENT
Start: 2022-07-19

## 2022-09-02 ENCOUNTER — TELEPHONE (OUTPATIENT)
Dept: FAMILY MEDICINE CLINIC | Facility: CLINIC | Age: 72
End: 2022-09-02

## 2022-09-02 NOTE — TELEPHONE ENCOUNTER
Spoke to pt and  on the phone and explained that a more resent visit would be needed so this can be taken care of at the next OV. They agreed.

## 2022-09-02 NOTE — TELEPHONE ENCOUNTER
----- Message from Marbin Carlin Dr sent at 9/2/2022 10:04 AM EDT -----  Subject: Referral Request    Reason for referral request? [facility]  Provider patient wants to be referred to(if known):     Provider Phone Number(if known): Additional Information for Provider? Patient has Parkinson's and is   requesting Home health care a couple days a week to check up on her and   encourage eating.  Please call Patient spouse to discuss if you have   questions, and call when order is in  ---------------------------------------------------------------------------  --------------  [id number] [facility]    [phone]; OK to leave message on voicemail  ---------------------------------------------------------------------------  --------------

## 2022-09-13 ENCOUNTER — OFFICE VISIT (OUTPATIENT)
Dept: FAMILY MEDICINE CLINIC | Facility: CLINIC | Age: 72
End: 2022-09-13
Payer: MEDICARE

## 2022-09-13 VITALS
HEART RATE: 91 BPM | OXYGEN SATURATION: 98 % | BODY MASS INDEX: 24.58 KG/M2 | SYSTOLIC BLOOD PRESSURE: 122 MMHG | RESPIRATION RATE: 17 BRPM | DIASTOLIC BLOOD PRESSURE: 80 MMHG | WEIGHT: 134.4 LBS

## 2022-09-13 DIAGNOSIS — K59.00 CONSTIPATION, UNSPECIFIED CONSTIPATION TYPE: ICD-10-CM

## 2022-09-13 DIAGNOSIS — G20 PARKINSON DISEASE (HCC): Primary | ICD-10-CM

## 2022-09-13 DIAGNOSIS — E78.2 MIXED HYPERLIPIDEMIA: ICD-10-CM

## 2022-09-13 DIAGNOSIS — E03.9 ACQUIRED HYPOTHYROIDISM: ICD-10-CM

## 2022-09-13 DIAGNOSIS — I10 PRIMARY HYPERTENSION: ICD-10-CM

## 2022-09-13 PROBLEM — N18.4 CKD (CHRONIC KIDNEY DISEASE) STAGE 4, GFR 15-29 ML/MIN (HCC): Status: ACTIVE | Noted: 2022-09-13

## 2022-09-13 PROCEDURE — 1123F ACP DISCUSS/DSCN MKR DOCD: CPT | Performed by: FAMILY MEDICINE

## 2022-09-13 PROCEDURE — 99214 OFFICE O/P EST MOD 30 MIN: CPT | Performed by: FAMILY MEDICINE

## 2022-09-13 RX ORDER — OMEPRAZOLE 20 MG/1
20 CAPSULE, DELAYED RELEASE ORAL DAILY
Qty: 90 CAPSULE | Refills: 3 | Status: SHIPPED | OUTPATIENT
Start: 2022-09-13

## 2022-09-13 RX ORDER — TRIAMTERENE AND HYDROCHLOROTHIAZIDE 75; 50 MG/1; MG/1
TABLET ORAL
Qty: 90 TABLET | Refills: 3 | Status: SHIPPED | OUTPATIENT
Start: 2022-09-13 | End: 2022-09-29

## 2022-09-13 RX ORDER — LEVOTHYROXINE SODIUM 0.1 MG/1
100 TABLET ORAL
Qty: 90 TABLET | Refills: 3 | Status: SHIPPED | OUTPATIENT
Start: 2022-09-13 | End: 2022-10-06 | Stop reason: DRUGHIGH

## 2022-09-13 RX ORDER — ATENOLOL 50 MG/1
TABLET ORAL
Qty: 90 TABLET | Refills: 3 | Status: SHIPPED | OUTPATIENT
Start: 2022-09-13 | End: 2022-09-29

## 2022-09-13 RX ORDER — ROSUVASTATIN CALCIUM 20 MG/1
TABLET, COATED ORAL
Qty: 90 TABLET | Refills: 3 | Status: SHIPPED | OUTPATIENT
Start: 2022-09-13

## 2022-09-13 ASSESSMENT — ENCOUNTER SYMPTOMS
COUGH: 0
DIARRHEA: 0
SINUS PAIN: 0
EYE DISCHARGE: 0
CHEST TIGHTNESS: 0
ABDOMINAL PAIN: 0
SHORTNESS OF BREATH: 0
CONSTIPATION: 1

## 2022-09-13 ASSESSMENT — PATIENT HEALTH QUESTIONNAIRE - PHQ9
SUM OF ALL RESPONSES TO PHQ QUESTIONS 1-9: 0
SUM OF ALL RESPONSES TO PHQ QUESTIONS 1-9: 0
1. LITTLE INTEREST OR PLEASURE IN DOING THINGS: 0
2. FEELING DOWN, DEPRESSED OR HOPELESS: 0
SUM OF ALL RESPONSES TO PHQ9 QUESTIONS 1 & 2: 0
SUM OF ALL RESPONSES TO PHQ QUESTIONS 1-9: 0
SUM OF ALL RESPONSES TO PHQ QUESTIONS 1-9: 0

## 2022-09-13 ASSESSMENT — ANXIETY QUESTIONNAIRES
IF YOU CHECKED OFF ANY PROBLEMS ON THIS QUESTIONNAIRE, HOW DIFFICULT HAVE THESE PROBLEMS MADE IT FOR YOU TO DO YOUR WORK, TAKE CARE OF THINGS AT HOME, OR GET ALONG WITH OTHER PEOPLE: NOT DIFFICULT AT ALL
7. FEELING AFRAID AS IF SOMETHING AWFUL MIGHT HAPPEN: 0
3. WORRYING TOO MUCH ABOUT DIFFERENT THINGS: 0
6. BECOMING EASILY ANNOYED OR IRRITABLE: 0
4. TROUBLE RELAXING: 0
2. NOT BEING ABLE TO STOP OR CONTROL WORRYING: 0
1. FEELING NERVOUS, ANXIOUS, OR ON EDGE: 0
5. BEING SO RESTLESS THAT IT IS HARD TO SIT STILL: 0
GAD7 TOTAL SCORE: 0

## 2022-09-13 NOTE — PROGRESS NOTES
Diagnosis Date    Cancer of kidney ([facility] [address]     Dystonic tremor     left arm    GERD (gastroesophageal reflux disease)     PRN meds    H/O complete eye exam     Dr. [facility]    Herpes zoster     Hypertension     Hypothyroidism     Left renal mass 11/3/2017    Solitary right kidney        Surgical History  Past Surgical History:   Procedure Laterality Date    COLONOSCOPY  2010    with EGD    CT BIOPSY RENAL  9/27/2017    CT BIOPSY RENAL 9/27/2017 [facility] CT SCAN    KIDNEY REMOVAL Left 11/03/2017    renal cell cancer    NEPHRECTOMY Left 11/03/2017    PETER AND BSO (CERVIX REMOVED)            Family History  Family History   Problem Relation Age of Onset    Elevated Lipids Father     Osteoporosis Mother     Heart Disease Mother     Heart Disease Father     No Known Problems Sister     Diabetes Son 15        type 1       Social History  Social History     Socioeconomic History    Marital status:      Spouse name: Not on file    Number of children: Not on file    Years of education: Not on file    Highest education level: Not on file   Occupational History    Not on file   Tobacco Use    Smoking status: Never    Smokeless tobacco: Never   Substance and Sexual Activity    Alcohol use: Yes     Alcohol/week: 0.0 standard drinks    Drug use: Never    Sexual activity: Not on file   Other Topics Concern    Not on file   Social History Narrative    Not on file     Social Determinants of Health     Financial Resource Strain: Not on file   Food Insecurity: Not on file   Transportation Needs: Not on file   Physical Activity: Not on file   Stress: Not on file   Social Connections: Not on file   Intimate Partner Violence: Not on file   Housing Stability: Not on file       Social History     Tobacco Use   Smoking Status Never   Smokeless Tobacco Never       Allergies  No Known Allergies    Vital Signs  Body mass index is 24.58 kg/m².   Vitals:    09/13/22 1153   BP: 122/80   Pulse: 91   Resp: 17   SpO2: 98% Weight: 134 lb 6.4 oz (61 kg)       Physical Exam  Physical Exam  Vitals reviewed. Constitutional:       Appearance: Normal appearance. HENT:      Head: Normocephalic. Right Ear: Tympanic membrane normal.      Left Ear: Tympanic membrane normal.      Nose: No congestion. Mouth/Throat:      Pharynx: No oropharyngeal exudate or posterior oropharyngeal erythema. Eyes:      Extraocular Movements: Extraocular movements intact. Conjunctiva/sclera: Conjunctivae normal.   Cardiovascular:      Rate and Rhythm: Normal rate and regular rhythm. Heart sounds: Normal heart sounds. No murmur heard. Pulmonary:      Effort: Pulmonary effort is normal.      Breath sounds: Normal breath sounds. No wheezing. Abdominal:      General: Bowel sounds are normal. There is no distension. Palpations: Abdomen is soft. There is no mass. Tenderness: There is no abdominal tenderness. There is no guarding or rebound. Genitourinary:     Rectum: Guaiac result negative. Comments: Moderate, firm stool in vault  Musculoskeletal:         General: No tenderness. Right lower leg: No edema. Left lower leg: No edema. Lymphadenopathy:      Cervical: No cervical adenopathy. Skin:     General: Skin is warm and dry. Findings: No rash. Neurological:      General: No focal deficit present. Mental Status: She is alert. Psychiatric:         Mood and Affect: Mood normal.         Thought Content: Thought content normal.        Assessment and Plan  Bon Gerardo was seen today for constipation. Diagnoses and all orders for this visit:    Parkinson disease ([facility] [address]    Constipation, unspecified constipation type    Other orders  -     atenolol (TENORMIN) 50 MG tablet; TAKE ONE-HALF TABLET DAILY  -     carbidopa-levodopa (SINEMET)  MG per tablet; TAKE 1 TABLET BY MOUTH THREE TIMES A DAY  -     levothyroxine (SYNTHROID) 100 MCG tablet;  Take 1 tablet by mouth every morning (before breakfast)  -

## 2022-09-15 NOTE — PERIOP NOTES
Family updated. New onset  Patient unable to flex right foot on exam   Patient's daughter will be making an appointment with Orthopedics    Patient is at risk for falls  Fall precautions  Brace ordered for right foot until seen by Orthopedics

## 2022-09-19 ENCOUNTER — TELEPHONE (OUTPATIENT)
Dept: FAMILY MEDICINE CLINIC | Facility: CLINIC | Age: 72
End: 2022-09-19

## 2022-09-19 NOTE — TELEPHONE ENCOUNTER
Patient's  states she has 3 different bp meds (Losartan, Maxzide, Atenlol). Is she supposed to be on all three meds? Please call patient to discuss at [phone].

## 2022-09-20 NOTE — TELEPHONE ENCOUNTER
Per Dr. Loren Alvarez, Her BP has been stable the last times she was here so continue on the regimen unless BP goes below 120/70. Spoke to pt's  and he states that she passed out today in the shower and was taken to the Hospital. Her BP was 99/170.  Advise for them to follow the orders from the ER and then follow up here for their scheduled appointment on 09/29/22

## 2022-09-29 ENCOUNTER — OFFICE VISIT (OUTPATIENT)
Dept: FAMILY MEDICINE CLINIC | Facility: CLINIC | Age: 72
End: 2022-09-29
Payer: MEDICARE

## 2022-09-29 ENCOUNTER — PATIENT MESSAGE (OUTPATIENT)
Dept: FAMILY MEDICINE CLINIC | Facility: CLINIC | Age: 72
End: 2022-09-29

## 2022-09-29 ENCOUNTER — HOME HEALTH ADMISSION (OUTPATIENT)
Dept: HOME HEALTH SERVICES | Facility: HOME HEALTH | Age: 72
End: 2022-09-29
Payer: MEDICARE

## 2022-09-29 VITALS
HEART RATE: 94 BPM | BODY MASS INDEX: 23.96 KG/M2 | TEMPERATURE: 97.1 F | SYSTOLIC BLOOD PRESSURE: 96 MMHG | DIASTOLIC BLOOD PRESSURE: 70 MMHG | OXYGEN SATURATION: 96 % | WEIGHT: 131 LBS

## 2022-09-29 DIAGNOSIS — Z00.00 ENCOUNTER FOR SUBSEQUENT ANNUAL WELLNESS VISIT (AWV) IN MEDICARE PATIENT: Primary | ICD-10-CM

## 2022-09-29 DIAGNOSIS — E03.9 ACQUIRED HYPOTHYROIDISM: ICD-10-CM

## 2022-09-29 DIAGNOSIS — I10 PRIMARY HYPERTENSION: ICD-10-CM

## 2022-09-29 DIAGNOSIS — E78.00 PURE HYPERCHOLESTEROLEMIA: ICD-10-CM

## 2022-09-29 DIAGNOSIS — K21.9 GASTROESOPHAGEAL REFLUX DISEASE WITHOUT ESOPHAGITIS: ICD-10-CM

## 2022-09-29 DIAGNOSIS — G20 PARKINSON DISEASE (HCC): ICD-10-CM

## 2022-09-29 DIAGNOSIS — Z23 FLU VACCINE NEED: ICD-10-CM

## 2022-09-29 DIAGNOSIS — K59.00 CONSTIPATION, UNSPECIFIED CONSTIPATION TYPE: ICD-10-CM

## 2022-09-29 DIAGNOSIS — R41.3 MEMORY PROBLEM: ICD-10-CM

## 2022-09-29 PROCEDURE — G0439 PPPS, SUBSEQ VISIT: HCPCS | Performed by: FAMILY MEDICINE

## 2022-09-29 PROCEDURE — 99214 OFFICE O/P EST MOD 30 MIN: CPT | Performed by: FAMILY MEDICINE

## 2022-09-29 PROCEDURE — G0008 ADMIN INFLUENZA VIRUS VAC: HCPCS | Performed by: FAMILY MEDICINE

## 2022-09-29 PROCEDURE — 90694 VACC AIIV4 NO PRSRV 0.5ML IM: CPT | Performed by: FAMILY MEDICINE

## 2022-09-29 PROCEDURE — 1123F ACP DISCUSS/DSCN MKR DOCD: CPT | Performed by: FAMILY MEDICINE

## 2022-09-29 RX ORDER — LOSARTAN POTASSIUM 50 MG/1
50 TABLET ORAL DAILY
Qty: 90 TABLET | Refills: 3 | Status: SHIPPED | OUTPATIENT
Start: 2022-09-29

## 2022-09-29 RX ORDER — LOSARTAN POTASSIUM 100 MG/1
TABLET ORAL
COMMUNITY
Start: 2022-08-16 | End: 2022-09-29 | Stop reason: DRUGHIGH

## 2022-09-29 ASSESSMENT — ENCOUNTER SYMPTOMS
RECTAL PAIN: 0
NAUSEA: 0
ABDOMINAL PAIN: 0
VOMITING: 0
CONSTIPATION: 1
ANAL BLEEDING: 0
EYES NEGATIVE: 1
DIARRHEA: 0
ABDOMINAL DISTENTION: 0
BLOOD IN STOOL: 0
RESPIRATORY NEGATIVE: 1

## 2022-09-29 ASSESSMENT — PATIENT HEALTH QUESTIONNAIRE - PHQ9
SUM OF ALL RESPONSES TO PHQ QUESTIONS 1-9: 0
SUM OF ALL RESPONSES TO PHQ9 QUESTIONS 1 & 2: 0
SUM OF ALL RESPONSES TO PHQ QUESTIONS 1-9: 0
2. FEELING DOWN, DEPRESSED OR HOPELESS: 0
1. LITTLE INTEREST OR PLEASURE IN DOING THINGS: 0

## 2022-09-29 ASSESSMENT — LIFESTYLE VARIABLES: HOW MANY STANDARD DRINKS CONTAINING ALCOHOL DO YOU HAVE ON A TYPICAL DAY: PATIENT DOES NOT DRINK

## 2022-09-29 NOTE — PROGRESS NOTES
HISTORY OF PRESENT ILLNESS    Chip Daniels is a 74 y.o. female. HPI  Chief Complaint   Patient presents with    Medicare AW    Constipation    Other     Handicap placard     See above. Stable on current regimen. Followed by neurology for Parkinson's and some early dementia. Stable at present. No side effects from BP medication. No headache or vision change. Denies chest pain or SOB. No swelling. Home readings have been on the low side and patient went to ER a few weeks ago for syncope. BP was low and medications adjusted (stopped atenolol). Tolerating statin with no side effects. GERD symptoms are controlled. No side effects. No difficulty swallowing. No night time symptoms. BMs are normal.  Feels like thyroid supplement is working well. Denies excessive fatigue. No constipation. Denies dry skin. No side effects.  is primary care giver and is stressed with the responsibility but doing a good job. For several months has had frequent constipation. Miralax used to help but no longer. Current Outpatient Medications on File Prior to Visit   Medication Sig Dispense Refill    losartan (COZAAR) 100 MG tablet TAKE 1 TABLET (100 MG) BY MOUTH DAILY DO NOT START BEFORE AUGUST 17, 2022. carbidopa-levodopa (SINEMET)  MG per tablet TAKE 1 TABLET BY MOUTH THREE TIMES A  tablet 3    levothyroxine (SYNTHROID) 100 MCG tablet Take 1 tablet by mouth every morning (before breakfast) 90 tablet 3    omeprazole (PRILOSEC) 20 MG delayed release capsule Take 1 capsule by mouth daily 90 capsule 3    rosuvastatin (CRESTOR) 20 MG tablet Take 20 mg by mouth daily 90 tablet 3     No current facility-administered medications on file prior to visit.      Past Medical History:   Diagnosis Date    Cancer of kidney ([facility] [address]     Dystonic tremor     left arm    GERD (gastroesophageal reflux disease)     PRN meds    H/O complete eye exam     [facility]    Herpes zoster     Hypertension     Hypothyroidism Left renal mass 11/3/2017    Solitary right kidney        Review of Systems   Constitutional: Negative. HENT: Negative. Eyes: Negative. Respiratory: Negative. Cardiovascular: Negative. Gastrointestinal:  Positive for constipation. Negative for abdominal distention, abdominal pain, anal bleeding, blood in stool, diarrhea, nausea, rectal pain and vomiting. Genitourinary: Negative. Musculoskeletal: Negative. Neurological:  Positive for light-headedness (occasional when standing). Negative for dizziness, tremors (controlled), seizures, syncope, facial asymmetry, speech difficulty, numbness and headaches. Psychiatric/Behavioral: Negative. Blood pressure 96/70, pulse 94, temperature 97.1 °F (36.2 °C), temperature source Temporal, weight 131 lb (59.4 kg), SpO2 96 %. Physical Exam  Vitals and nursing note reviewed. Constitutional:       Appearance: Normal appearance. HENT:      Mouth/Throat:      Mouth: Mucous membranes are moist.      Pharynx: Oropharynx is clear. Eyes:      Conjunctiva/sclera: Conjunctivae normal.   Neck:      Vascular: No carotid bruit. Cardiovascular:      Rate and Rhythm: Normal rate and regular rhythm. Heart sounds: Normal heart sounds. Pulmonary:      Breath sounds: Normal breath sounds. Musculoskeletal:         General: No swelling or tenderness. Cervical back: Neck supple. Comments: Moderate rigidity on flexion and extension of extremities   Lymphadenopathy:      Cervical: No cervical adenopathy. Psychiatric:         Mood and Affect: Mood normal.        ASSESSMENT and PLAN    Clotilde Saeed was seen today for medicare awv, constipation and other. Diagnoses and all orders for this visit:    Encounter for subsequent annual wellness visit (AWV) in Medicare patient    Primary hypertension  -     Urinalysis; Future  -     CBC with Auto Differential; Future  -     Comprehensive Metabolic Panel; Future  -     Lipid Panel;  Future  -     TSH; Future  -     TSH  -     Lipid Panel  -     Comprehensive Metabolic Panel  -     CBC with Auto Differential  -     Urinalysis    Acquired hypothyroidism  -     TSH; Future  -     TSH    Gastroesophageal reflux disease without esophagitis    Parkinson disease (HCC)  -     [address] [facility]    Pure hypercholesterolemia  -     Lipid Panel; Future  -     Lipid Panel    Constipation, unspecified constipation type    Flu vaccine need  -     Influenza, FLUAD, (age 72 y+), IM, Preservative Free, 0.5 mL    Memory problem  -     [address]    Other orders  -     Handicap Placard MISC; by Does not apply route   Noted repeat BP by me is 98/70. Decrease Cozaar dose to 50mg. Samples of Linzess 72mg. Follow up according to response. Continue other maintenance meds. Update immunization. Notify lab results  Home health referral to evaluate needs. Handicap Placard for filled out. Return in 1 year (on 9/29/2023), or if symptoms worsen or fail to improve, for Medicare Annual Wellness Visit in 1 year. Karla Crowell MD  Medicare Annual Wellness Visit    Martha Millard is here for Medicare AWV, Constipation, and Other (Handicap placard)    Assessment & Plan   Encounter for subsequent annual wellness visit (AWV) in Medicare patient  Primary hypertension  -     Urinalysis; Future  -     CBC with Auto Differential; Future  -     Comprehensive Metabolic Panel; Future  -     Lipid Panel; Future  -     TSH; Future  Acquired hypothyroidism  -     TSH; Future  Gastroesophageal reflux disease without esophagitis  Parkinson disease ([facility] [address]  -     [address]  Pure hypercholesterolemia  -     Lipid Panel;  Future  Constipation, unspecified constipation type  Flu vaccine need  -     Influenza, FLUAD, (age 72 y+), IM, Preservative Free, 0.5 mL  Memory problem  -     [address]    Recommendations for [employer] Due: see orders and patient instructions/AVS.  Recommended screening schedule for the next 5-10 years is provided to the patient in written form: see Patient Instructions/AVS.     Return in 1 year (on 9/29/2023), or if symptoms worsen or fail to improve, for Medicare Annual Wellness Visit in 1 year. Subjective       Patient's complete Health Risk Assessment and screening values have been reviewed and are found in Flowsheets. The following problems were reviewed today and where indicated follow up appointments were made and/or referrals ordered.     Positive Risk Factor Screenings with Interventions:    Fall Risk:  Do you feel unsteady or are you worried about falling? : (!) yes  2 or more falls in past year?: no  Fall with injury in past year?: no   Fall Risk Interventions:    Home exercises provided to promote strength and balance    Cognitive:  Clock Drawing Test (CDT): Normal  Total Score Interpretation: Abnormal Mini-Cog  Did the patient refuse to take the cognition test?: No  Cognitive Impairment Interventions:             General Health and ACP:  General  In general, how would you say your health is?: (!) Poor  In the past 7 days, have you experienced any of the following: New or Increased Pain, New or Increased Fatigue, Loneliness, Social Isolation, Stress or Anger?: (!) Yes  Select all that apply: (!) New or Increased Fatigue  Do you get the social and emotional support that you need?: Yes  Do you have a Living Will?: Yes    Advance Directives       Power of  Living Will ACP-Advance Directive ACP-Power of     Not on File Not on File Not on File Not on File        General Health Risk Interventions:      Health Habits/Nutrition:  Physical Activity: Inactive    Days of Exercise per Week: 0 days    Minutes of Exercise per Session: 0 min     Have you lost any weight without trying in the past 3 months?: (!) Yes     Have you seen the dentist within the past year?: Yes  Health Habits/Nutrition Interventions:        ADLs:  In the past 7 days, did you need help from others to perform any of the following everyday activities: Eating, dressing, grooming, bathing, toileting, or walking/balance?: (!) Yes  Select all that apply: (!) Eating, Dressing, Grooming, Bathing, Toileting, Walking/Balance  In the past 7 days, did you need help from others to take care of any of the following: Laundry, housekeeping, banking/finances, shopping, telephone use, food preparation, transportation, or taking medications?: (!) Yes  Select all that apply: [employer], Taking Medications, Housekeeping, Banking/Finances, Telephone Use, Shopping, Food Preparation, Transportation  ADL Interventions:  Referred for Home Health Services          Objective   Vitals:    09/29/22 0852   BP: 96/70   Site: Left Upper Arm   Position: Sitting   Cuff Size: Medium Adult   Pulse: 94   Temp: 97.1 °F (36.2 °C)   TempSrc: Temporal   SpO2: 96%   Weight: 131 lb (59.4 kg)      Body mass index is 23.96 kg/m². No Known Allergies  Prior to Visit Medications    Medication Sig Taking? Authorizing Provider   losartan (COZAAR) 100 MG tablet TAKE 1 TABLET (100 MG) BY MOUTH DAILY DO NOT START BEFORE AUGUST 17, 2022.  Yes Historical Provider, MD   Handicap Placard MISC by Does not apply route Yes Akila Blake MD   carbidopa-levodopa (SINEMET)  MG per tablet TAKE 1 TABLET BY MOUTH THREE TIMES A DAY Yes Beulah Mitchell MD   levothyroxine (SYNTHROID) 100 MCG tablet Take 1 tablet by mouth every morning (before breakfast) Yes Beulah Mitchell MD   omeprazole (PRILOSEC) 20 MG delayed release capsule Take 1 capsule by mouth daily Yes Beulah Mitchell MD   rosuvastatin (CRESTOR) 20 MG tablet Take 20 mg by mouth daily Yes Beulah Mitchell MD       CareTe (Including outside providers/suppliers regularly involved in providing care):   Patient Care Team:  Akila Blake MD as PCP - General  Akila Blake MD as PCP - Glenn Orta Provider     Reviewed and updated this visit:  Tobacco Allergies  Meds  Med Hx  Surg Hx  Soc Hx  Fam Hx

## 2022-09-29 NOTE — PATIENT INSTRUCTIONS
Personalized Preventive Plan for Shaylee Shipley - 9/29/2022  Medicare offers a range of preventive health benefits. Some of the tests and screenings are paid in full while other may be subject to a deductible, co-insurance, and/or copay. Some of these benefits include a comprehensive review of your medical history including lifestyle, illnesses that may run in your family, and various assessments and screenings as appropriate. After reviewing your medical record and screening and assessments performed today your provider may have ordered immunizations, labs, imaging, and/or referrals for you. A list of these orders (if applicable) as well as your Preventive Care list are included within your After Visit Summary for your review. Other Preventive Recommendations:    A preventive eye exam performed by an eye specialist is recommended every 1-2 years to screen for glaucoma; cataracts, macular degeneration, and other eye disorders. A preventive dental visit is recommended every 6 months. Try to get at least 150 minutes of exercise per week or 10,000 steps per day on a pedometer . Order or download the FREE \"Exercise & Physical Activity: Your Everyday Guide\" from [employer]. Call 6-767.666.3149 or search [employer] online. You need 0262-1255 mg of calcium and 7163-0401 IU of vitamin D per day. It is possible to meet your calcium requirement with diet alone, but a vitamin D supplement is usually necessary to meet this goal.  When exposed to the sun, use a sunscreen that protects against both UVA and UVB radiation with an SPF of 30 or greater. Reapply every 2 to 3 hours or after sweating, drying off with a towel, or swimming. Always wear a seat belt when traveling in a car. Always wear a helmet when riding a bicycle or motorcycle.

## 2022-09-30 ENCOUNTER — TELEPHONE (OUTPATIENT)
Dept: FAMILY MEDICINE CLINIC | Facility: CLINIC | Age: 72
End: 2022-09-30

## 2022-09-30 DIAGNOSIS — R41.3 MEMORY PROBLEM: ICD-10-CM

## 2022-09-30 DIAGNOSIS — G20 PARKINSON DISEASE (HCC): Primary | ICD-10-CM

## 2022-09-30 LAB
ALBUMIN SERPL-MCNC: 3.7 G/DL (ref 3.2–4.6)
ALBUMIN/GLOB SERPL: 1.3 {RATIO} (ref 1.2–3.5)
ALP SERPL-CCNC: 52 U/L (ref 50–136)
ALT SERPL-CCNC: 8 U/L (ref 12–65)
ANION GAP SERPL CALC-SCNC: 7 MMOL/L (ref 4–13)
AST SERPL-CCNC: 17 U/L (ref 15–37)
BASOPHILS # BLD: 0.1 K/UL (ref 0–0.2)
BASOPHILS NFR BLD: 1 % (ref 0–2)
BILIRUB SERPL-MCNC: 0.6 MG/DL (ref 0.2–1.1)
BUN SERPL-MCNC: 17 MG/DL (ref 8–23)
CALCIUM SERPL-MCNC: 9.3 MG/DL (ref 8.3–10.4)
CHLORIDE SERPL-SCNC: 102 MMOL/L (ref 101–110)
CHOLEST SERPL-MCNC: 136 MG/DL
CO2 SERPL-SCNC: 26 MMOL/L (ref 21–32)
CREAT SERPL-MCNC: 2 MG/DL (ref 0.6–1)
DIFFERENTIAL METHOD BLD: ABNORMAL
EOSINOPHIL # BLD: 0.3 K/UL (ref 0–0.8)
EOSINOPHIL NFR BLD: 5 % (ref 0.5–7.8)
ERYTHROCYTE [DISTWIDTH] IN BLOOD BY AUTOMATED COUNT: 15.3 % (ref 11.9–14.6)
GLOBULIN SER CALC-MCNC: 2.9 G/DL (ref 2.3–3.5)
GLUCOSE SERPL-MCNC: 95 MG/DL (ref 65–100)
HCT VFR BLD AUTO: 40.6 % (ref 35.8–46.3)
HDLC SERPL-MCNC: 45 MG/DL (ref 40–60)
HDLC SERPL: 3 {RATIO}
HGB BLD-MCNC: 12.9 G/DL (ref 11.7–15.4)
IMM GRANULOCYTES # BLD AUTO: 0 K/UL (ref 0–0.5)
IMM GRANULOCYTES NFR BLD AUTO: 0 % (ref 0–5)
LDLC SERPL CALC-MCNC: 67 MG/DL
LYMPHOCYTES # BLD: 1.1 K/UL (ref 0.5–4.6)
LYMPHOCYTES NFR BLD: 15 % (ref 13–44)
MCH RBC QN AUTO: 31.2 PG (ref 26.1–32.9)
MCHC RBC AUTO-ENTMCNC: 31.8 G/DL (ref 31.4–35)
MCV RBC AUTO: 98.1 FL (ref 79.6–97.8)
MONOCYTES # BLD: 0.5 K/UL (ref 0.1–1.3)
MONOCYTES NFR BLD: 6 % (ref 4–12)
NEUTS SEG # BLD: 5.4 K/UL (ref 1.7–8.2)
NEUTS SEG NFR BLD: 73 % (ref 43–78)
NRBC # BLD: 0 K/UL (ref 0–0.2)
PLATELET # BLD AUTO: 320 K/UL (ref 150–450)
PMV BLD AUTO: 10.7 FL (ref 9.4–12.3)
POTASSIUM SERPL-SCNC: 4 MMOL/L (ref 3.5–5.1)
PROT SERPL-MCNC: 6.6 G/DL (ref 6.3–8.2)
RBC # BLD AUTO: 4.14 M/UL (ref 4.05–5.2)
SODIUM SERPL-SCNC: 135 MMOL/L (ref 136–145)
TRIGL SERPL-MCNC: 120 MG/DL (ref 35–150)
TSH, 3RD GENERATION: 67.9 UIU/ML (ref 0.36–3.74)
VLDLC SERPL CALC-MCNC: 24 MG/DL (ref 6–23)
WBC # BLD AUTO: 7.4 K/UL (ref 4.3–11.1)

## 2022-09-30 NOTE — TELEPHONE ENCOUNTER
----- Message from Cassandra Marmolejo MD sent at 9/30/2022  7:38 AM EDT -----  Notify thyroid is very low. Make sure has been taking it.  If so increase to 150mcg #90 1 po qd and recheck TSJ in 4-6 weeks  ----- Message -----  From: [place] [employer]  Sent: 9/30/2022   4:51 AM EDT  To: Cassandra Marmolejo MD

## 2022-10-02 ENCOUNTER — HOME CARE VISIT (OUTPATIENT)
Dept: HOME HEALTH SERVICES | Facility: HOME HEALTH | Age: 72
End: 2022-10-02

## 2022-10-04 ENCOUNTER — HOME CARE VISIT (OUTPATIENT)
Dept: SCHEDULING | Facility: HOME HEALTH | Age: 72
End: 2022-10-04
Payer: MEDICARE

## 2022-10-04 VITALS
SYSTOLIC BLOOD PRESSURE: 102 MMHG | DIASTOLIC BLOOD PRESSURE: 72 MMHG | TEMPERATURE: 97.2 F | OXYGEN SATURATION: 97 % | RESPIRATION RATE: 18 BRPM | HEART RATE: 68 BPM

## 2022-10-04 PROCEDURE — G0151 HHCP-SERV OF PT,EA 15 MIN: HCPCS

## 2022-10-04 PROCEDURE — 400013 HH SOC

## 2022-10-04 ASSESSMENT — ENCOUNTER SYMPTOMS
CONSTIPATION: 1
PAIN LOCATION - PAIN QUALITY: SORE

## 2022-10-04 NOTE — TELEPHONE ENCOUNTER
Elizabeth Devlin MD 10/4/2022 11:59 AM EDT    It was not available for a while but if pharmacy has citrate of magnesia this should relieve constipation. Make sure she is taking thyroid supplement because her low thyroid is the likely cause for constipation. In the meantime can increase Linzess to 145mg #30 1 po qd RFx3  ----- Message -----  From: Radha Reyes MA  Sent: 10/4/2022 11:52 AM EDT  To: Elizabeth Devlin MD  Subject: FW: Prescription       ----- Message -----  From: Yanna Polo  Sent: 10/3/2022 5:58 PM EDT  To: , *  Subject: Prescription      those pills linzess worked first day , havent worked since. Cant have bowel movement has taken one every day as directed. Is miserable what is next step? Going to give enama Tuesday to get her some relief.  Thanks

## 2022-10-04 NOTE — TELEPHONE ENCOUNTER
It was not available for a while but if pharmacy has citrate of magnesia this should relieve constipation. Make sure she is taking thyroid supplement because her low thyroid is the likely cause for constipation.  In the meantime can increase Linzess to 145mg #30 1 po qd RFx3

## 2022-10-05 ENCOUNTER — PATIENT MESSAGE (OUTPATIENT)
Dept: FAMILY MEDICINE CLINIC | Facility: CLINIC | Age: 72
End: 2022-10-05

## 2022-10-06 RX ORDER — LEVOTHYROXINE SODIUM 112 UG/1
112 TABLET ORAL DAILY
Qty: 90 TABLET | Refills: 1 | Status: SHIPPED | OUTPATIENT
Start: 2022-10-06

## 2022-10-06 NOTE — TELEPHONE ENCOUNTER
Nella Leiva MD 10/6/2022 12:02 PM EDT    Yes increase to 112mcg #60 1 po qd and have her recheck TSH after on the dose for 4-6 weeks  ----- Message -----  From: Margoth Azul MA  Sent: 10/6/2022 11:43 AM EDT  To: Nella Leiva MD  Subject: FW: Tsh results     Pt was not taking regularly for a month but has been now for 3 weeks.  Did you still want her to change dose?      ----- Message -----  From: Luis Valle  Sent: 10/6/2022 10:55 AM EDT  To: , *  Subject: Tsh results     3 weeks regularly

## 2022-10-07 ENCOUNTER — HOME CARE VISIT (OUTPATIENT)
Dept: SCHEDULING | Facility: HOME HEALTH | Age: 72
End: 2022-10-07
Payer: MEDICARE

## 2022-10-07 PROCEDURE — G0157 HHC PT ASSISTANT EA 15: HCPCS

## 2022-10-08 VITALS
DIASTOLIC BLOOD PRESSURE: 60 MMHG | HEART RATE: 100 BPM | SYSTOLIC BLOOD PRESSURE: 100 MMHG | OXYGEN SATURATION: 98 % | TEMPERATURE: 97.2 F | RESPIRATION RATE: 16 BRPM

## 2022-10-10 ENCOUNTER — PATIENT MESSAGE (OUTPATIENT)
Dept: FAMILY MEDICINE CLINIC | Facility: CLINIC | Age: 72
End: 2022-10-10

## 2022-10-10 DIAGNOSIS — K59.00 CONSTIPATION, UNSPECIFIED CONSTIPATION TYPE: Primary | ICD-10-CM

## 2022-10-11 ENCOUNTER — HOME CARE VISIT (OUTPATIENT)
Dept: SCHEDULING | Facility: HOME HEALTH | Age: 72
End: 2022-10-11
Payer: MEDICARE

## 2022-10-11 VITALS
TEMPERATURE: 98.3 F | SYSTOLIC BLOOD PRESSURE: 112 MMHG | RESPIRATION RATE: 17 BRPM | HEART RATE: 80 BPM | OXYGEN SATURATION: 94 % | DIASTOLIC BLOOD PRESSURE: 68 MMHG

## 2022-10-11 VITALS
SYSTOLIC BLOOD PRESSURE: 132 MMHG | DIASTOLIC BLOOD PRESSURE: 70 MMHG | RESPIRATION RATE: 21 BRPM | HEART RATE: 115 BPM | TEMPERATURE: 96.8 F

## 2022-10-11 PROCEDURE — G0157 HHC PT ASSISTANT EA 15: HCPCS

## 2022-10-11 PROCEDURE — G0156 HHCP-SVS OF AIDE,EA 15 MIN: HCPCS

## 2022-10-13 ENCOUNTER — HOME CARE VISIT (OUTPATIENT)
Dept: SCHEDULING | Facility: HOME HEALTH | Age: 72
End: 2022-10-13
Payer: MEDICARE

## 2022-10-13 VITALS
TEMPERATURE: 97.5 F | SYSTOLIC BLOOD PRESSURE: 132 MMHG | RESPIRATION RATE: 20 BRPM | DIASTOLIC BLOOD PRESSURE: 70 MMHG | HEART RATE: 111 BPM

## 2022-10-13 PROCEDURE — G0156 HHCP-SVS OF AIDE,EA 15 MIN: HCPCS

## 2022-10-13 NOTE — TELEPHONE ENCOUNTER
From: Suzanne Celeste  Sent: 10/13/2022 10:41 AM EDT  To: Roland Lay [facility] Clinical Staff  Subject: Diarrhea     Dr. Columba Hu can you send a referral for a gastronomic doctor ? Ilene Dionna still having bowel problems causing a lot of stomach pain have been using miralax linzess stool softener. There has to be a health problem.  Eating good drinking liquid nothing seems to help. thanks

## 2022-10-14 ENCOUNTER — HOME CARE VISIT (OUTPATIENT)
Dept: SCHEDULING | Facility: HOME HEALTH | Age: 72
End: 2022-10-14
Payer: MEDICARE

## 2022-10-14 VITALS
SYSTOLIC BLOOD PRESSURE: 102 MMHG | DIASTOLIC BLOOD PRESSURE: 60 MMHG | TEMPERATURE: 98.6 F | RESPIRATION RATE: 18 BRPM | HEART RATE: 98 BPM | OXYGEN SATURATION: 98 %

## 2022-10-14 PROCEDURE — G0157 HHC PT ASSISTANT EA 15: HCPCS

## 2022-10-18 ENCOUNTER — HOME CARE VISIT (OUTPATIENT)
Dept: SCHEDULING | Facility: HOME HEALTH | Age: 72
End: 2022-10-18
Payer: MEDICARE

## 2022-10-18 VITALS
DIASTOLIC BLOOD PRESSURE: 72 MMHG | RESPIRATION RATE: 20 BRPM | OXYGEN SATURATION: 98 % | HEART RATE: 96 BPM | SYSTOLIC BLOOD PRESSURE: 104 MMHG

## 2022-10-18 PROCEDURE — G0151 HHCP-SERV OF PT,EA 15 MIN: HCPCS

## 2022-10-20 ENCOUNTER — HOME CARE VISIT (OUTPATIENT)
Dept: SCHEDULING | Facility: HOME HEALTH | Age: 72
End: 2022-10-20
Payer: MEDICARE

## 2022-10-20 VITALS
DIASTOLIC BLOOD PRESSURE: 68 MMHG | TEMPERATURE: 97.2 F | SYSTOLIC BLOOD PRESSURE: 128 MMHG | RESPIRATION RATE: 22 BRPM | HEART RATE: 100 BPM

## 2022-10-20 PROCEDURE — G0156 HHCP-SVS OF AIDE,EA 15 MIN: HCPCS

## 2022-10-21 ENCOUNTER — HOME CARE VISIT (OUTPATIENT)
Dept: SCHEDULING | Facility: HOME HEALTH | Age: 72
End: 2022-10-21
Payer: MEDICARE

## 2022-10-21 VITALS
SYSTOLIC BLOOD PRESSURE: 100 MMHG | RESPIRATION RATE: 16 BRPM | OXYGEN SATURATION: 98 % | DIASTOLIC BLOOD PRESSURE: 62 MMHG | TEMPERATURE: 98.3 F

## 2022-10-21 PROCEDURE — G0157 HHC PT ASSISTANT EA 15: HCPCS

## 2022-10-25 ENCOUNTER — HOME CARE VISIT (OUTPATIENT)
Dept: HOME HEALTH SERVICES | Facility: HOME HEALTH | Age: 72
End: 2022-10-25
Payer: MEDICARE

## 2022-10-26 ENCOUNTER — HOME CARE VISIT (OUTPATIENT)
Dept: SCHEDULING | Facility: HOME HEALTH | Age: 72
End: 2022-10-26
Payer: MEDICARE

## 2022-10-26 VITALS
DIASTOLIC BLOOD PRESSURE: 70 MMHG | HEART RATE: 100 BPM | TEMPERATURE: 98.1 F | SYSTOLIC BLOOD PRESSURE: 124 MMHG | RESPIRATION RATE: 20 BRPM

## 2022-10-26 PROCEDURE — G0156 HHCP-SVS OF AIDE,EA 15 MIN: HCPCS

## 2022-10-28 ENCOUNTER — HOME CARE VISIT (OUTPATIENT)
Dept: SCHEDULING | Facility: HOME HEALTH | Age: 72
End: 2022-10-28
Payer: MEDICARE

## 2022-10-28 ENCOUNTER — HOME CARE VISIT (OUTPATIENT)
Dept: HOME HEALTH SERVICES | Facility: HOME HEALTH | Age: 72
End: 2022-10-28
Payer: MEDICARE

## 2022-10-28 VITALS
DIASTOLIC BLOOD PRESSURE: 72 MMHG | SYSTOLIC BLOOD PRESSURE: 120 MMHG | TEMPERATURE: 97.4 F | RESPIRATION RATE: 21 BRPM | HEART RATE: 90 BPM

## 2022-10-28 PROCEDURE — G0151 HHCP-SERV OF PT,EA 15 MIN: HCPCS

## 2022-10-28 PROCEDURE — G0156 HHCP-SVS OF AIDE,EA 15 MIN: HCPCS

## 2022-10-31 ENCOUNTER — TELEPHONE (OUTPATIENT)
Dept: FAMILY MEDICINE CLINIC | Facility: CLINIC | Age: 72
End: 2022-10-31

## 2022-10-31 VITALS
OXYGEN SATURATION: 97 % | DIASTOLIC BLOOD PRESSURE: 62 MMHG | RESPIRATION RATE: 18 BRPM | SYSTOLIC BLOOD PRESSURE: 98 MMHG | HEART RATE: 86 BPM | TEMPERATURE: 97.8 F

## 2022-10-31 NOTE — TELEPHONE ENCOUNTER
----- Message from Jia Jamad sent at 10/31/2022  9:13 AM EDT -----  Subject: Appointment Request    Reason for Call: Established Patient Appointment needed: Urgent (Patient   Request) ED Follow Up Visit    QUESTIONS    Reason for appointment request? Available appointments did not meet   patient need     Additional Information for Provider? Patient spouse Neema Smith needs a call back. He needs a appointment urgently. He says the patient is dying, unable to   eat or drink, no strength, losing too much weight. She fainted last week   and 911 had to be called. Please call to schedule a appointment with Dr. Martin Ordonez.    ---------------------------------------------------------------------------  --------------  Rohith Stubbs [facility]  [phone]; OK to leave message on voicemail  ---------------------------------------------------------------------------  --------------  SCRIPT ANSWERS  COVID Screen: Diana Villalpando

## 2022-11-02 ENCOUNTER — HOME CARE VISIT (OUTPATIENT)
Dept: HOME HEALTH SERVICES | Facility: HOME HEALTH | Age: 72
End: 2022-11-02
Payer: MEDICARE

## 2022-11-15 ENCOUNTER — HOME CARE VISIT (OUTPATIENT)
Dept: HOME HEALTH SERVICES | Facility: HOME HEALTH | Age: 72
End: 2022-11-15

## 2022-11-30 ENCOUNTER — TELEPHONE (OUTPATIENT)
Dept: FAMILY MEDICINE CLINIC | Facility: CLINIC | Age: 72
End: 2022-11-30

## 2022-11-30 NOTE — TELEPHONE ENCOUNTER
Rosendo Barajas from [id number] [facility] home health needs call back stating if Dr. Gaye Sahu is willing to follow plan of care.      Please call back at [phone]

## 2022-12-08 ENCOUNTER — OFFICE VISIT (OUTPATIENT)
Dept: FAMILY MEDICINE CLINIC | Facility: CLINIC | Age: 72
End: 2022-12-08
Payer: MEDICARE

## 2022-12-08 VITALS
DIASTOLIC BLOOD PRESSURE: 72 MMHG | WEIGHT: 122 LBS | HEART RATE: 88 BPM | OXYGEN SATURATION: 96 % | SYSTOLIC BLOOD PRESSURE: 118 MMHG | BODY MASS INDEX: 22.31 KG/M2 | TEMPERATURE: 97.8 F

## 2022-12-08 DIAGNOSIS — I10 PRIMARY HYPERTENSION: ICD-10-CM

## 2022-12-08 DIAGNOSIS — Z09 HOSPITAL DISCHARGE FOLLOW-UP: Primary | ICD-10-CM

## 2022-12-08 DIAGNOSIS — E03.9 ACQUIRED HYPOTHYROIDISM: ICD-10-CM

## 2022-12-08 DIAGNOSIS — K59.00 CONSTIPATION, UNSPECIFIED CONSTIPATION TYPE: ICD-10-CM

## 2022-12-08 DIAGNOSIS — R25.1 TREMOR: ICD-10-CM

## 2022-12-08 PROCEDURE — 99214 OFFICE O/P EST MOD 30 MIN: CPT | Performed by: FAMILY MEDICINE

## 2022-12-08 PROCEDURE — 1123F ACP DISCUSS/DSCN MKR DOCD: CPT | Performed by: FAMILY MEDICINE

## 2022-12-08 PROCEDURE — 3078F DIAST BP <80 MM HG: CPT | Performed by: FAMILY MEDICINE

## 2022-12-08 PROCEDURE — 3074F SYST BP LT 130 MM HG: CPT | Performed by: FAMILY MEDICINE

## 2022-12-08 RX ORDER — MIDODRINE HYDROCHLORIDE 10 MG/1
10 TABLET ORAL 3 TIMES DAILY
COMMUNITY
Start: 2022-12-02 | End: 2023-01-01

## 2022-12-08 RX ORDER — MIRTAZAPINE 7.5 MG/1
TABLET, FILM COATED ORAL
COMMUNITY
Start: 2022-11-25

## 2022-12-08 ASSESSMENT — ENCOUNTER SYMPTOMS
ALLERGIC/IMMUNOLOGIC NEGATIVE: 1
RESPIRATORY NEGATIVE: 1
GASTROINTESTINAL NEGATIVE: 1
EYES NEGATIVE: 1
CONSTIPATION: 0

## 2022-12-08 ASSESSMENT — PATIENT HEALTH QUESTIONNAIRE - PHQ9
SUM OF ALL RESPONSES TO PHQ QUESTIONS 1-9: 0
SUM OF ALL RESPONSES TO PHQ9 QUESTIONS 1 & 2: 0
SUM OF ALL RESPONSES TO PHQ QUESTIONS 1-9: 0
1. LITTLE INTEREST OR PLEASURE IN DOING THINGS: 0
SUM OF ALL RESPONSES TO PHQ QUESTIONS 1-9: 0
2. FEELING DOWN, DEPRESSED OR HOPELESS: 0
SUM OF ALL RESPONSES TO PHQ QUESTIONS 1-9: 0

## 2022-12-08 NOTE — PROGRESS NOTES
HISTORY OF PRESENT ILLNESS    Lawson Hampton is a 74 y.o. female. HPI  Chief Complaint   Patient presents with    Follow-Up from Hospital     Impacted 11/01/22 -11/15/22     See above. Was admitted on 1 Nov for abdominal pain; difficulty swallowing and weight loss. Work up indicated stool impaction. Abdominal pain was relieved when impaction was removed. Constipation has remained controlled with Linzess. Patient also required IV hydration. NG tube was inserted because of dysphagia. Work up revealed no specific etiology and was felt related to her Parkinson's. Did well after tube was removed. Due to low BP Cozaar was stopped. Followed by neurology for Parkinson's and tremor. Has been stable. Overall doing well since discharge on 15 Nov. Appetite is good and has gained weight. Tolerating statin with no side effects. Last TSH by me indicated thyroid function was very low. Feels like thyroid supplement is working well. Denies excessive fatigue. No constipation. Denies dry skin. No side effects. Current Outpatient Medications on File Prior to Visit   Medication Sig Dispense Refill    mirtazapine (REMERON) 7.5 MG tablet TAKE 1 TABLET BY MOUTH EVERYDAY AT BEDTIME      midodrine (PROAMATINE) 10 MG tablet Take 10 mg by mouth 3 times daily      levothyroxine (SYNTHROID) 112 MCG tablet Take 1 tablet by mouth daily 90 tablet 1    linaclotide (LINZESS) 145 MCG capsule Take 1 capsule by mouth every morning (before breakfast) 30 capsule 3    acetaminophen (TYLENOL) 500 MG tablet Take 1,000 mg by mouth every 6 hours as needed for Pain.      losartan (COZAAR) 50 MG tablet Take 1 tablet by mouth daily 90 tablet 3    carbidopa-levodopa (SINEMET)  MG per tablet TAKE 1 TABLET BY MOUTH THREE TIMES A  tablet 3    rosuvastatin (CRESTOR) 20 MG tablet Take 20 mg by mouth daily 90 tablet 3     No current facility-administered medications on file prior to visit.      Past Medical History:   Diagnosis Date    Cancer of kidney (HCC)     Dystonic tremor     left arm    GERD (gastroesophageal reflux disease)     PRN meds    H/O complete eye exam     [facility]    Herpes zoster     Hypertension     Hypothyroidism     Left renal mass 11/3/2017    Solitary right kidney        Review of Systems   Constitutional: Negative. HENT: Negative. Eyes: Negative. Respiratory: Negative. Cardiovascular: Negative. Gastrointestinal: Negative. Negative for constipation (controlled). Endocrine: Negative. Genitourinary: Negative. Musculoskeletal: Negative. Allergic/Immunologic: Negative. Neurological:  Positive for tremors (stable). Negative for dizziness, facial asymmetry, speech difficulty, light-headedness, numbness and headaches. Psychiatric/Behavioral: Negative. Blood pressure 118/72, pulse 88, temperature 97.8 °F (36.6 °C), temperature source Temporal, weight 122 lb (55.3 kg), SpO2 96 %. Physical Exam  Vitals and nursing note reviewed. Constitutional:       Appearance: Normal appearance. HENT:      Mouth/Throat:      Mouth: Mucous membranes are moist.      Pharynx: Oropharynx is clear. Eyes:      Conjunctiva/sclera: Conjunctivae normal.   Neck:      Vascular: No carotid bruit. Cardiovascular:      Rate and Rhythm: Normal rate and regular rhythm. Heart sounds: Normal heart sounds. Pulmonary:      Breath sounds: Normal breath sounds. Musculoskeletal:         General: No swelling or tenderness. Cervical back: Neck supple. Lymphadenopathy:      Cervical: No cervical adenopathy. Neurological:      Mental Status: Mental status is at baseline. Psychiatric:         Mood and Affect: Mood normal.        ASSESSMENT and PLAN    Lucia Pereira was seen today for follow-up from hospital.    Diagnoses and all orders for this visit:    Hospital discharge follow-up    Primary hypertension    Acquired hypothyroidism  -     TSH;  Future  -     TSH    Tremor    Constipation, unspecified constipation type    Other orders  -     Handicap Placard MISC; by Does not apply route   Reviewed hospital record with pt and . Stay of Cozaar. Continue other maintenance measures. Notify lab results  Continue therapy as planned. Return in 3 months (on 3/8/2023), or if symptoms worsen or fail to improve.     Abby Paul MD

## 2022-12-09 LAB — TSH, 3RD GENERATION: 0.07 UIU/ML (ref 0.36–3.74)

## 2022-12-13 ENCOUNTER — PATIENT MESSAGE (OUTPATIENT)
Dept: FAMILY MEDICINE CLINIC | Facility: CLINIC | Age: 72
End: 2022-12-13

## 2022-12-14 ENCOUNTER — OFFICE VISIT (OUTPATIENT)
Dept: ORTHOPEDIC SURGERY | Age: 72
End: 2022-12-14

## 2022-12-14 DIAGNOSIS — M65.342 TRIGGER FINGER, LEFT RING FINGER: ICD-10-CM

## 2022-12-14 DIAGNOSIS — M25.642 STIFFNESS OF FINGER JOINT OF LEFT HAND: ICD-10-CM

## 2022-12-14 DIAGNOSIS — M65.332 TRIGGER FINGER, LEFT MIDDLE FINGER: Primary | ICD-10-CM

## 2022-12-14 DIAGNOSIS — M79.642 LEFT HAND PAIN: ICD-10-CM

## 2022-12-14 DIAGNOSIS — M79.642 LEFT HAND PAIN: Primary | ICD-10-CM

## 2022-12-14 DIAGNOSIS — M65.332 TRIGGER FINGER, LEFT MIDDLE FINGER: ICD-10-CM

## 2022-12-14 RX ORDER — BETAMETHASONE SODIUM PHOSPHATE AND BETAMETHASONE ACETATE 3; 3 MG/ML; MG/ML
6 INJECTION, SUSPENSION INTRA-ARTICULAR; INTRALESIONAL; INTRAMUSCULAR; SOFT TISSUE ONCE
Status: COMPLETED | OUTPATIENT
Start: 2022-12-14 | End: 2022-12-14

## 2022-12-14 RX ORDER — METHYLPREDNISOLONE 4 MG/1
TABLET ORAL
Qty: 1 KIT | Refills: 0 | Status: SHIPPED | OUTPATIENT
Start: 2022-12-14

## 2022-12-14 RX ORDER — MELOXICAM 15 MG/1
15 TABLET ORAL DAILY
Qty: 42 TABLET | Refills: 0 | Status: SHIPPED | OUTPATIENT
Start: 2022-12-14 | End: 2023-01-25

## 2022-12-14 RX ADMIN — BETAMETHASONE SODIUM PHOSPHATE AND BETAMETHASONE ACETATE 6 MG: 3; 3 INJECTION, SUSPENSION INTRA-ARTICULAR; INTRALESIONAL; INTRAMUSCULAR; SOFT TISSUE at 11:24

## 2022-12-14 NOTE — PROGRESS NOTES
options. We discussed observation, therapy, antiinflammatory medications and other pertinent treatment modalities. After discussing in detail the patient elects to proceed with left middle and ring finger A1 pulley steroid injections, I evaluated the patient 10 minutes after injection, I was able to extend the PIP joints but not the MCP joints due to pain at the joints, I explained that I believe she has some degree of joint contracture due to the position of the joint for the past month but the main culprit was likely trigger fingers, I fitted the patient for figure-of-eight splints of the middle and ring fingers to prevent them from locking again, I referred the patient to hand therapy to work on serial splinting, I will also prescribe a Medrol Dosepak followed by Mobic 15 daily for 6 weeks to help with the swelling and the pain. I will reassess the patient in 4 weeks. I explained that I do not believe this is extrinsic contracture from Parkinson's disease given that no other fingers affected. I personally discussed the case with a hand therapist to guide her treatment. Procedure Note    The risk, benefits and alternatives of injection and no injection therapy were discussed. The patient consented for an injection. The patient has been identified by name and birthdate. The injection site was identified, marked and prepped with a alcohol swab. Time out completed. The A1 pulley of the Left middle and ring finger was/were injected with a 25 gauge needle with 1ml of 6mg/ml Betamethasone and 1ml xylocaine plain 1%. The injection site was then dressed with a bandaid. The patient tolerated the injection well. The patient was instructed to monitor their blood sugars if diabetic and call if any concerns. The patient was instructed to call the office if any adverse local effects occurred or any if any questions or concerns arise.        Patient voiced accordance and understanding of the information provided and the formulated plan. All questions were answered to the patient's satisfaction during the encounter.     Holger Jason MD  Orthopaedic Surgery  12/14/22  11:59 AM

## 2022-12-14 NOTE — PROGRESS NOTES
[address] [facility] ORTHOPEDICS - [address] [address]  Dept: [phone]      Occupational Therapy Initial Assessment     Referring MD: Prem Calvert MD    Diagnosis:     ICD-10-CM    1. Left hand pain  M79.642       2. Trigger finger, left ring finger  M65.342       3. Trigger finger, left middle finger  M65.332       4.  Stiffness of finger joint of left hand  M25.642            Surgery: Date NA     Therapy precautions: None    History of injury/onset : 4 weeks ago    Total Direct Treatment Time: 10 min                       Total In Office Time: 45 min    Preferred Name:  Ladonna Pitcher HISTORY     PMHX & Meds:   Past Medical History:   Diagnosis Date    Cancer of kidney ([address]     Dystonic tremor     left arm    GERD (gastroesophageal reflux disease)     PRN meds    H/O complete eye exam     Dr. [facility]    Herpes zoster     Hypertension     Hypothyroidism     Left renal mass 11/3/2017    Solitary right kidney    ,   Past Surgical History:   Procedure Laterality Date    COLONOSCOPY  2010    with EGD    CT BIOPSY RENAL  9/27/2017    CT BIOPSY RENAL 9/27/2017 [facility] CT SCAN    KIDNEY REMOVAL Left 11/03/2017    renal cell cancer    NEPHRECTOMY Left 11/03/2017    PETER AND BSO (CERVIX REMOVED)        Medications. : Reviewed in chart  Allergies: No Known Allergies     SUBJECTIVE     Current Symptoms/Chief complaints:   Chief Complaint   Patient presents with    Hand Pain       History of injury/onset including previous Medical/therapy treatments: Injections this date to TISHA LAGUNAS trigger fingers    Chief complaint/history of injury:   Date symptoms began: 4 weeks ago  Adilene Orly of condition:Recent onset (initial onset within last 3 months)  Primary cause of current episode: Unspecified  How did symptoms start: during hospitalization contractures developed after fingers locked  Describe current symptoms: Pain and stiffness, flexion contractures TISHA,JANNETH right hand MPs    Received previous outpatient therapy? No      Pain Assessment:  Pain location: R hand  Average Pain/symptom intensity (0-10 scale)  Last 24 hours: 5/10  Last week (1-7 days): 5/10  How often do you feel symptoms? Constant (% of time)  Description: aching  Aggravating factors:  passive stretching extension  Alleviating factors:  rest    Social/Functional Hx:  Pt lives lives with their spouse   Current DME: brace/splint:fit this date to provide LLSS for MP extension;  MD fit PIPs with oval 8 orthoses  Work Status: Retired   Sleep: minimally disturbed  PLOF & Social Hx/Interests: Independent and active without physical limitations  Current level of function: requires mod assist with BADL's, baseline    Neuro screen: Pt denies c/o and numbness in the affected fingers    Patient Stated Goals: \"to use my hand again\"    OBJECTIVE     Functional Outcome Measures: Quick Dash  46 score=   79.55 % functional deficit  Hand/Side Dominance: right handed  Observation/Posture: Holding UE in protected position  Palpation: Tender L hand  Swelling/Edema: moderate L MF, RF  Skin Integrity: normal     A/PROM MEASUREMENTS       Digital AROM:  IE IF LF RF SF   AROM    MCP       AROM      PIP       AROM      DIP       Active flexion to [facility]           Digital PROM:  IE IF LF RF SF   PROM    MCP  /60 /60    PROM      PIP       PROM      DIP       Passive flexion to [facility]       Strength/MMT (0-5 Scale) : Not tested secondary to precautions      Special Tests:  None performed   Evaluation Modifications/Assistance required : Verbal cues and Physical cues  Degree of assistance provided: moderate    Treatment:  Initial Evaluation: Low Complexity (32887)      Therapeutic exercise (89681) x 10 min:  Home Exercise Program development and Education: see below.   Patient I with program following instruction and performance  Use of heat and ice as needed for pain and inflammation the patient exhibits multiple (3-5) performance deficits. Comorbidities affect the patient's occupational performance. The following performance deficits (including but not limited to physical, cognitive and/or psychosocial components) result in activity limitations and participation restrictions: Dexterity, Mobility, and Strength    The patient would benefit from skilled occupational therapy services to address the deficits noted above for return to prior level of function. PLAN OF CARE     Effective Dates: 12/14/2022 TO 3/16/2023 (90 days).     Frequency/Duration: 2x/week for 90 Day(s)  Interventions may include but are not limited to: (26457) Therapeutic exercise to develop strength and endurance, range of motion, and flexibility, (33965) Manual Therapy:   Consisting of but not limited to hands on treatment by therapist for connective tissue massage, pain management, edema management, joint mobilization and manipulation, manual traction, passive range of motion, soft tissue and neural system mobilization/manipulation and therapeutic massage., (24687 Initial orthotic management and training: Fabrication/adjustments as indicated, (77941) Subsequent orthotic management as indicated, Modalities prn to address pain, spasms, and swelling: (15533/) Electrical stimulation- unattended  (97878) Ultrasound/phonophoresis  (89758) Hot/cold pack  (92276) Fluidotherapy  (19668) Paraffin, Home exercise program (HEP) development, (70890) Kineseotaping for Neuromuscular Re-education : Muscle inhibition or facilitation, space correction, to improve proprioception,; for endurance or correction of postural stabilizers; addressing trophic changes of complex regional pain syndrome, (67464) Kineseotaping for Manual Therapy Techniques for soft tissue mobilization, facilitation of muscles, pain management, lymphatic management, swelling management, scar mobilization, myofascial correction, mechanical joint correction or support, and (62808) Vera Min for Therapeutic Procedure/Exercise  for strengthening or lengthening a muscle. Used in conjunction with retraining posture, endurance and flexibility    GOALS     Short term goals: 1/13/2023  (4 weeks)  Patient and  will be I with HEP and precautions. Pt and  will be I with orthosis jazmin/doff, wear, care, precautions, rationale for use  Increase AROM of affected MF,RF MP extension to at least 45 ° to improve ability to open hand to obtain objects. Increase AROM of affected MF, RF PIP flexion to at least 65 ° to improve ability to grasp. Improve Quick DASH functional assessment score from less than 65% deficit. Long term goals: 3/10/2023  (12 weeks)   Pt will be able to use the affected UE for all ADL's without difficulty. Pt will have adequate strength to be able to hold and open containers without difficulty. Pt will be able to make a full composite fist with the involved hand to enable to grasp and hold objects. Pt will have full active composite extension of affected side to be able to open hand to acquire items for ADL's. Pt will lack 25°  or less of MP extension L MF/RF. Improve Quick DASH functional assessment score from less than 20% deficit.       [facility] Portal     OT Protocols

## 2022-12-21 RX ORDER — MIRTAZAPINE 7.5 MG/1
TABLET, FILM COATED ORAL
Qty: 30 TABLET | Refills: 3 | Status: SHIPPED | OUTPATIENT
Start: 2022-12-21

## 2022-12-21 NOTE — TELEPHONE ENCOUNTER
Patricia, Processor 2022 10:36 AM EST    Dr Elizabeth Morris a prescription that was written by the [address] [place] [address] staff filled by [facility] has  has enough pills til Saturday it is mirtazapibe can you refill this?  Thanks

## 2022-12-22 ENCOUNTER — OFFICE VISIT (OUTPATIENT)
Dept: ORTHOPEDIC SURGERY | Age: 72
End: 2022-12-22
Payer: MEDICARE

## 2022-12-22 DIAGNOSIS — M79.642 LEFT HAND PAIN: Primary | ICD-10-CM

## 2022-12-22 DIAGNOSIS — M25.642 STIFFNESS OF FINGER JOINT OF LEFT HAND: ICD-10-CM

## 2022-12-22 PROCEDURE — 97110 THERAPEUTIC EXERCISES: CPT | Performed by: OCCUPATIONAL THERAPIST

## 2022-12-22 PROCEDURE — 97760 ORTHOTIC MGMT&TRAING 1ST ENC: CPT | Performed by: OCCUPATIONAL THERAPIST

## 2022-12-22 NOTE — PROGRESS NOTES
[facility] [address] [address]  Dept: [phone]      Occupational Therapy Daily Note     Referring MD: Adamaris Najera MD    Diagnosis:     ICD-10-CM    1. Left hand pain  M79.642       2. Stiffness of finger joint of left hand  M25.642              Surgery: Date NA     Therapy precautions: None    History of injury/onset : 4 weeks ago    Total Direct Treatment Time: 45 min                       Total In Office Time: 45 min    Preferred Name:  Vero Hoyt HISTORY     PMHX & Meds:   Past Medical History:   Diagnosis Date    Cancer of kidney ([address]     Dystonic tremor     left arm    GERD (gastroesophageal reflux disease)     PRN meds    H/O complete eye exam     [facility]. [facility]    Herpes zoster     Hypertension     Hypothyroidism     Left renal mass 11/3/2017    Solitary right kidney    ,   Past Surgical History:   Procedure Laterality Date    COLONOSCOPY  2010    with EGD    CT BIOPSY RENAL  9/27/2017    CT BIOPSY RENAL 9/27/2017 [facility] CT SCAN    KIDNEY REMOVAL Left 11/03/2017    renal cell cancer    NEPHRECTOMY Left 11/03/2017    PETER AND BSO (CERVIX REMOVED)        Medications. : Reviewed in chart  Allergies: No Known Allergies     SUBJECTIVE     Current Symptoms/Chief complaints:   Chief Complaint   Patient presents with    Hand Pain       OBJECTIVE     Digital ROM:   LF 12/22/22 RF 12/22/22         MCP 84/96 A  75 P ext 92/76 A  75 P ext          PIP 72/90 A  50 P ext 71/100 A 36 P ext          DIP 0/26 A 0/4 A     Active flexion to [facility]         Treatment:    Therapeutic exercise (96241) x 10 min:  Use of heat and ice as needed for pain and inflammation reduction. Precautions reviewed.   OT POC and rationale, education for surgical precautions and pain management, edema management  Orthotics Management and Training Initial Encounter (17959) 35 min:  Management and training including assessment and fitting of the upper extremity, initial encounter  Remold for better fit: isolate IP extension add new component with HFO over lay to capture MP and IP extension          ASSESSMENT     Patient not wearing oval 8s previsouly issued and difficulty with proper donning of custom HFO. Reviewed and took pictures this visit. Custom IP extension gutter to 3,4,5 fit to wear underneath HFO that captures the Mps. Instructed if not able to tolerate composite extension that she should wear one or the other. Patient's presentation seems more neurologic than joint related. There is some secondary Joint tightness, but the slow gradual pull back into flexion seems more like increased tone/spasticity. Patient unaware of previous TIA/CVA. Will monitor and treatment plan for LLSS to increase finger extension and decrease flexion contracture. PLAN     Per above. Wear orthoses as much as possible to increase extension. Will follow up in therapy for modalities, orthoses adjustments. GOALS     Short term goals: 1/13/2023  (4 weeks)  Patient and  will be I with HEP and precautions. Pt and  will be I with orthosis jazmin/doff, wear, care, precautions, rationale for use  Increase AROM of affected MF,RF MP extension to at least 45 ° to improve ability to open hand to obtain objects. Increase AROM of affected MF, RF PIP flexion to at least 65 ° to improve ability to grasp. Improve Quick DASH functional assessment score from less than 65% deficit. Long term goals: 3/10/2023  (12 weeks)   Pt will be able to use the affected UE for all ADL's without difficulty. Pt will have adequate strength to be able to hold and open containers without difficulty. Pt will be able to make a full composite fist with the involved hand to enable to grasp and hold objects. Pt will have full active composite extension of affected side to be able to open hand to acquire items for ADL's. Pt will lack 25°  or less of MP extension L MF/RF.   Improve Quick DASH functional assessment score from less than 20% deficit.       [facility] Portal     OT Protocols

## 2022-12-27 ENCOUNTER — OFFICE VISIT (OUTPATIENT)
Dept: ORTHOPEDIC SURGERY | Age: 72
End: 2022-12-27
Payer: MEDICARE

## 2022-12-27 DIAGNOSIS — M25.642 STIFFNESS OF FINGER JOINT OF LEFT HAND: ICD-10-CM

## 2022-12-27 DIAGNOSIS — M65.332 TRIGGER FINGER, LEFT MIDDLE FINGER: ICD-10-CM

## 2022-12-27 DIAGNOSIS — M79.642 LEFT HAND PAIN: Primary | ICD-10-CM

## 2022-12-27 DIAGNOSIS — M65.342 TRIGGER FINGER, LEFT RING FINGER: ICD-10-CM

## 2022-12-27 PROCEDURE — 97140 MANUAL THERAPY 1/> REGIONS: CPT | Performed by: OCCUPATIONAL THERAPIST

## 2022-12-27 PROCEDURE — 97763 ORTHC/PROSTC MGMT SBSQ ENC: CPT | Performed by: OCCUPATIONAL THERAPIST

## 2022-12-27 NOTE — PROGRESS NOTES
[facility] [address] [address]  Dept: [phone]      Occupational Therapy Daily Note     Referring MD: Faviola Choi MD    Diagnosis:     ICD-10-CM    1. Left hand pain  M79.642       2. Stiffness of finger joint of left hand  M25.642       3. Trigger finger, left ring finger  M65.342       4. Trigger finger, left middle finger  M65.332              Surgery: Date NA     Therapy precautions: None    History of injury/onset : 4 weeks ago    Total Direct Treatment Time: 45 min                       Total In Office Time: 65 min    Preferred Name:  Elva Caal HISTORY     PMHX & Meds:   Past Medical History:   Diagnosis Date    Cancer of kidney ([address]     Dystonic tremor     left arm    GERD (gastroesophageal reflux disease)     PRN meds    H/O complete eye exam     [facility]. [facility]    Herpes zoster     Hypertension     Hypothyroidism     Left renal mass 11/3/2017    Solitary right kidney    ,   Past Surgical History:   Procedure Laterality Date    COLONOSCOPY  2010    with EGD    CT BIOPSY RENAL  9/27/2017    CT BIOPSY RENAL 9/27/2017 [facility] CT SCAN    KIDNEY REMOVAL Left 11/03/2017    renal cell cancer    NEPHRECTOMY Left 11/03/2017    PETER AND BSO (CERVIX REMOVED)        Medications. : Reviewed in chart  Allergies: No Known Allergies     SUBJECTIVE     Current Symptoms/Chief complaints:   Chief Complaint   Patient presents with    Hand Pain     Pt comes in today with her . She states the splint has been slipping off.      OBJECTIVE     Digital ROM:   LF 12/22/22 RF 12/22/22         MCP 84/96 A  75 P ext 92/76 A  75 P ext          PIP 72/90 A  50 P ext 71/100 A 36 P ext          DIP 0/26 A 0/4 A     Active flexion to [facility]         Treatment:    Manual Treatment: x15 min:   Paraffin to left hand to prepare tissues for LL/LD stretch   Gentle progressive PROM RF/MF   Massage along RF and MF flexor tendons     Orthotics Management and Training Subsequent Encounter (63692) 30 min:  Management and training including assessment and fitting of the upper extremity, initial encounter  Remold for better fit; x1 piece palm to fingertips of RF/MF, pt held in as much extension as tolerable   Remolded following manual treatment   New straps cut           ASSESSMENT     Remolded extension orthosis to prevent contracture and spasticity in ring and middle fingers. Pt responsive to heat and stretch but is still having tendency to flex into fist with these fingers if not splinted. Today pt was splinted in as much tolerable extension as we could get after manual treatment. Pt coming again Thursday for assessment and skin check/remold of orthosis. PLAN       Wear orthosis as much as possible   STM to release tension in ring and middle fingers   GOALS     Short term goals: 1/13/2023  (4 weeks)  Patient and  will be I with HEP and precautions. Pt and  will be I with orthosis jazmin/doff, wear, care, precautions, rationale for use  Increase AROM of affected MF,RF MP extension to at least 45 ° to improve ability to open hand to obtain objects. Increase AROM of affected MF, RF PIP flexion to at least 65 ° to improve ability to grasp. Improve Quick DASH functional assessment score from less than 65% deficit. Long term goals: 3/10/2023  (12 weeks)   Pt will be able to use the affected UE for all ADL's without difficulty. Pt will have adequate strength to be able to hold and open containers without difficulty. Pt will be able to make a full composite fist with the involved hand to enable to grasp and hold objects. Pt will have full active composite extension of affected side to be able to open hand to acquire items for ADL's. Pt will lack 25°  or less of MP extension L MF/RF. Improve Quick DASH functional assessment score from less than 20% deficit.       [employer] Portal     OT Protocols

## 2022-12-28 NOTE — PROGRESS NOTES
[facility] [address] [address]  Dept: [phone]      Occupational Therapy Daily Note     Referring MD: Coty Benavidez MD    Diagnosis:     ICD-10-CM    1. Left hand pain  M79.642       2. Stiffness of finger joint of left hand  M25.642       3. Trigger finger, left ring finger  M65.342       4. Trigger finger, left middle finger  M65.332              Surgery: Date NA     Therapy precautions: None    History of injury/onset : 4 weeks ago    Total Direct Treatment Time: 45 min                       Total In Office Time: 65 min    Preferred Name:  Porfirio Curtis HISTORY     PMHX & Meds:   Past Medical History:   Diagnosis Date    Cancer of kidney ([address]     Dystonic tremor     left arm    GERD (gastroesophageal reflux disease)     PRN meds    H/O complete eye exam     [facility]    Herpes zoster     Hypertension     Hypothyroidism     Left renal mass 11/3/2017    Solitary right kidney    ,   Past Surgical History:   Procedure Laterality Date    COLONOSCOPY  2010    with EGD    CT BIOPSY RENAL  9/27/2017    CT BIOPSY RENAL 9/27/2017 [facility] CT SCAN    KIDNEY REMOVAL Left 11/03/2017    renal cell cancer    NEPHRECTOMY Left 11/03/2017    PETER AND BSO (CERVIX REMOVED)        Medications. : Reviewed in chart  Allergies: No Known Allergies     SUBJECTIVE     Current Symptoms/Chief complaints:   No chief complaint on file. Pt reports she has been in a lot of pain over the past 48 hours due to the extensive stretch that we placed her in.      OBJECTIVE     Digital ROM:   LF 12/22/22 RF 12/22/22         MCP 84/96 A  75 P ext 92/76 A  75 P ext          PIP 72/90 A  50 P ext 71/100 A 36 P ext          DIP 0/26 A 0/4 A     Active flexion to [facility]         Treatment:    Manual Treatment: x45 min:    Moist hot pack to left hand with gentle stretch by therapist   Gentle progressive PROM RF/MF (sustained)   Massage along RF and MF flexor tendons           ASSESSMENT     Pt continues to have spasticity and stiffness in left hand RF/MF. Prolonged splinting has helped maintain improvements made during therapy session. Pt experiencing significant pain with PROM/stretch. She is also developing some stiffness in IP joints of RF for flexion as well. Main goal is to promote finger extension as much as possible. She may need a neurology consult as well. PLAN       Wear orthosis as much as possible   STM to release tension in ring and middle fingers     GOALS     Short term goals: 1/13/2023  (4 weeks)  Patient and  will be I with HEP and precautions. Pt and  will be I with orthosis jazmin/doff, wear, care, precautions, rationale for use  Increase AROM of affected MF,RF MP extension to at least 45 ° to improve ability to open hand to obtain objects. Increase AROM of affected MF, RF PIP flexion to at least 65 ° to improve ability to grasp. Improve Quick DASH functional assessment score from less than 65% deficit. Long term goals: 3/10/2023  (12 weeks)   Pt will be able to use the affected UE for all ADL's without difficulty. Pt will have adequate strength to be able to hold and open containers without difficulty. Pt will be able to make a full composite fist with the involved hand to enable to grasp and hold objects. Pt will have full active composite extension of affected side to be able to open hand to acquire items for ADL's. Pt will lack 25°  or less of MP extension L MF/RF. Improve Quick DASH functional assessment score from less than 20% deficit.       [facility] Portal     OT Protocols

## 2022-12-29 ENCOUNTER — OFFICE VISIT (OUTPATIENT)
Dept: ORTHOPEDIC SURGERY | Age: 72
End: 2022-12-29
Payer: MEDICARE

## 2022-12-29 DIAGNOSIS — M65.332 TRIGGER FINGER, LEFT MIDDLE FINGER: ICD-10-CM

## 2022-12-29 DIAGNOSIS — M25.642 STIFFNESS OF FINGER JOINT OF LEFT HAND: ICD-10-CM

## 2022-12-29 DIAGNOSIS — M79.642 LEFT HAND PAIN: Primary | ICD-10-CM

## 2022-12-29 DIAGNOSIS — M65.342 TRIGGER FINGER, LEFT RING FINGER: ICD-10-CM

## 2022-12-29 PROCEDURE — 97140 MANUAL THERAPY 1/> REGIONS: CPT | Performed by: OCCUPATIONAL THERAPIST

## 2022-12-30 NOTE — PROGRESS NOTES
[facility] [address] [address]  Dept: [phone]      Occupational Therapy Daily Note     Referring MD: Riddhi Mayen MD    Diagnosis:     ICD-10-CM    1. Left hand pain  M79.642       2. Stiffness of finger joint of left hand  M25.642       3. Trigger finger, left ring finger  M65.342       4. Trigger finger, left middle finger  M65.332              Surgery: Date NA     Therapy precautions: None    History of injury/onset : 4 weeks ago    Total Direct Treatment Time: 45 min                       Total In Office Time: 65 min    Preferred Name:  [place] HISTORY     PMHX & Meds:   Past Medical History:   Diagnosis Date    Cancer of kidney ([address]     Dystonic tremor     left arm    GERD (gastroesophageal reflux disease)     PRN meds    H/O complete eye exam     [facility]. [facility]    Herpes zoster     Hypertension     Hypothyroidism     Left renal mass 11/3/2017    Solitary right kidney    ,   Past Surgical History:   Procedure Laterality Date    COLONOSCOPY  2010    with EGD    CT BIOPSY RENAL  9/27/2017    CT BIOPSY RENAL 9/27/2017 [facility] CT SCAN    KIDNEY REMOVAL Left 11/03/2017    renal cell cancer    NEPHRECTOMY Left 11/03/2017    PETER AND BSO (CERVIX REMOVED)        Medications. : Reviewed in chart  Allergies: No Known Allergies     SUBJECTIVE     Current Symptoms/Chief complaints:   No chief complaint on file. Pt comes in today with her splint on incorrectly. She she tries to wear it most of the day, her  puts it on her.      OBJECTIVE     Digital ROM:   LF 12/22/22 RF 12/22/22         MCP 84/96 A  75 P ext 92/76 A  75 P ext          PIP 72/90 A  50 P ext 71/100 A 36 P ext          DIP 0/26 A 0/4 A     Active flexion to [facility]         Treatment:    Manual Treatment: x30 min:    Moist hot pack to left hand with gentle stretch by therapist   Gentle progressive PROM RF/MF (sustained)   Massage along RF and MF flexor tendons   Therapist assisted MP-IP extension at RF and SF       Orthotics Management and Training Subsequent Encounter (24167) 15 min:  Management and training including assessment and fitting of the upper extremity, initial encounter  Thorough education on don/doff splint, therapist perform x1 with pt's  videoing, pt's  assist pt in donning and doffing x2, cues throughout       ASSESSMENT     Pt continues to have spasticity and stiffness in left hand RF/MF. Pt was most likely wearing the splint incorrectly, however. We spent time today discussing how to wear orthosis in order to limit fingers from flexing. Pt makes passive AROM gains with extensive stretching during therapy sessions but shows up to each session more stiff and contracted/tight than before. PLAN       Wear orthosis as much as possible, try to remold and gain extension next visit   STM to release tension in ring and middle fingers     GOALS     Short term goals: 1/13/2023  (4 weeks)  Patient and  will be I with HEP and precautions. Pt and  will be I with orthosis jazmin/doff, wear, care, precautions, rationale for use  Increase AROM of affected MF,RF MP extension to at least 45 ° to improve ability to open hand to obtain objects. Increase AROM of affected MF, RF PIP flexion to at least 65 ° to improve ability to grasp. Improve Quick DASH functional assessment score from less than 65% deficit. Long term goals: 3/10/2023  (12 weeks)   Pt will be able to use the affected UE for all ADL's without difficulty. Pt will have adequate strength to be able to hold and open containers without difficulty. Pt will be able to make a full composite fist with the involved hand to enable to grasp and hold objects. Pt will have full active composite extension of affected side to be able to open hand to acquire items for ADL's. Pt will lack 25°  or less of MP extension L MF/RF.   Improve Quick DASH functional assessment score from less than 20% deficit.       [facility] Portal     OT Protocols

## 2023-01-03 ENCOUNTER — OFFICE VISIT (OUTPATIENT)
Dept: ORTHOPEDIC SURGERY | Age: 73
End: 2023-01-03
Payer: MEDICARE

## 2023-01-03 DIAGNOSIS — M65.332 TRIGGER FINGER, LEFT MIDDLE FINGER: ICD-10-CM

## 2023-01-03 DIAGNOSIS — M65.342 TRIGGER FINGER, LEFT RING FINGER: ICD-10-CM

## 2023-01-03 DIAGNOSIS — M79.642 LEFT HAND PAIN: Primary | ICD-10-CM

## 2023-01-03 DIAGNOSIS — M25.642 STIFFNESS OF FINGER JOINT OF LEFT HAND: ICD-10-CM

## 2023-01-03 PROCEDURE — 97763 ORTHC/PROSTC MGMT SBSQ ENC: CPT | Performed by: OCCUPATIONAL THERAPIST

## 2023-01-03 PROCEDURE — 97140 MANUAL THERAPY 1/> REGIONS: CPT | Performed by: OCCUPATIONAL THERAPIST

## 2023-01-06 ENCOUNTER — OFFICE VISIT (OUTPATIENT)
Dept: ORTHOPEDIC SURGERY | Age: 73
End: 2023-01-06
Payer: MEDICARE

## 2023-01-06 DIAGNOSIS — M65.342 TRIGGER FINGER, LEFT RING FINGER: ICD-10-CM

## 2023-01-06 DIAGNOSIS — M25.642 STIFFNESS OF FINGER JOINT OF LEFT HAND: ICD-10-CM

## 2023-01-06 DIAGNOSIS — M79.642 LEFT HAND PAIN: Primary | ICD-10-CM

## 2023-01-06 DIAGNOSIS — M65.332 TRIGGER FINGER, LEFT MIDDLE FINGER: ICD-10-CM

## 2023-01-06 PROCEDURE — 97763 ORTHC/PROSTC MGMT SBSQ ENC: CPT | Performed by: OCCUPATIONAL THERAPIST

## 2023-01-06 PROCEDURE — 97140 MANUAL THERAPY 1/> REGIONS: CPT | Performed by: OCCUPATIONAL THERAPIST

## 2023-01-06 RX ORDER — MIDODRINE HYDROCHLORIDE 10 MG/1
10 TABLET ORAL 3 TIMES DAILY
Qty: 90 TABLET | Refills: 3 | Status: SHIPPED | OUTPATIENT
Start: 2023-01-06

## 2023-01-06 NOTE — PROGRESS NOTES
[facility] [address] [address]  Dept: [phone]      Occupational Therapy Daily Note     Referring MD: Mike Hashimoto, MD    Diagnosis:     ICD-10-CM    1. Left hand pain  M79.642       2. Stiffness of finger joint of left hand  M25.642       3. Trigger finger, left ring finger  M65.342       4. Trigger finger, left middle finger  M65.332              Surgery: Date NA     Therapy precautions: None    History of injury/onset : 4 weeks ago    Total Direct Treatment Time: 50 min                       Total In Office Time: 60 min    Preferred Name:  Pat Modi HISTORY     PMHX & Meds:   Past Medical History:   Diagnosis Date    Cancer of kidney ([address]     Dystonic tremor     left arm    GERD (gastroesophageal reflux disease)     PRN meds    H/O complete eye exam     [facility]    Herpes zoster     Hypertension     Hypothyroidism     Left renal mass 11/3/2017    Solitary right kidney    ,   Past Surgical History:   Procedure Laterality Date    COLONOSCOPY  2010    with EGD    CT BIOPSY RENAL  9/27/2017    CT BIOPSY RENAL 9/27/2017 [facility]Y CT SCAN    KIDNEY REMOVAL Left 11/03/2017    renal cell cancer    NEPHRECTOMY Left 11/03/2017    PETER AND BSO (CERVIX REMOVED)        Medications. : Reviewed in chart  Allergies: No Known Allergies     SUBJECTIVE     Current Symptoms/Chief complaints:   No chief complaint on file. Pt reports continued pain in her palm.      OBJECTIVE     Digital ROM:   LF 12/22/22 RF 12/22/22         MCP 84/96 A  75 P ext 92/76 A  75 P ext          PIP 72/90 A  50 P ext 71/100 A 36 P ext          DIP 0/26 A 0/4 A     Active flexion to [facility]         Treatment:    Manual Treatment: x20 min:    Moist hot pack to left hand with gentle stretch by therapist   Gentle progressive PROM RF/MF (sustained)   Massage along RF and MF flexor tendons   Therapist assisted MP-IP extension at  and [facility]       Orthotics Management and Training Subsequent Encounter (15450) 30 min:  Management and training including assessment and fitting of the upper extremity, initial encounter  New orthosis molded due to gains in passive extension of fingers  Straps replaced and significant padding added to prevent skin breakdown       ASSESSMENT     Pt continues to have spasticity and stiffness in left hand RF/MF. However, gains were made today with passive extension, pt was able to tolerate a greater stretch for increased period of time. We remolded her splint today due to range of motion increase. She will see me with therapy again next Tuesday (1/10) and will follow-up with Dr. Gladys Quintana on 1/11. PLAN       Wear orthosis as much as possible, try to remold and gain extension next visit   STM to release tension in ring and middle fingers   Take measurements next Tuesday 1/10    GOALS     Short term goals: 1/13/2023  (4 weeks)  Patient and  will be I with HEP and precautions. Pt and  will be I with orthosis jazmin/doff, wear, care, precautions, rationale for use (progressing,  and pt will most likely require continuous education on donning orthosis)   Increase AROM of affected MF,RF MP extension to at least 45 ° to improve ability to open hand to obtain objects. Increase AROM of affected MF, RF PIP flexion to at least 65 ° to improve ability to grasp. Improve Quick DASH functional assessment score from less than 65% deficit. Long term goals: 3/10/2023  (12 weeks)   Pt will be able to use the affected UE for all ADL's without difficulty. Pt will have adequate strength to be able to hold and open containers without difficulty. Pt will be able to make a full composite fist with the involved hand to enable to grasp and hold objects. Pt will have full active composite extension of affected side to be able to open hand to acquire items for ADL's. Pt will lack 25°  or less of MP extension L MF/RF.   Improve Quick DASH functional assessment score from less than 20% deficit.       [facility] Portal     OT Protocols

## 2023-01-09 NOTE — PROGRESS NOTES
[facility] [address] [address]  Dept: [phone]      Occupational Therapy Daily Note     Referring MD: Lj Abbott MD    Diagnosis:     ICD-10-CM    1. Left hand pain  M79.642       2. Stiffness of finger joint of left hand  M25.642       3. Trigger finger, left ring finger  M65.342       4. Trigger finger, left middle finger  M65.332              Surgery: Date NA     Therapy precautions: None    History of injury/onset : 4 weeks ago    Total Direct Treatment Time: 45 min                       Total In Office Time: 60 min    Preferred Name:  Caryn Meckel HISTORY     PMHX & Meds:   Past Medical History:   Diagnosis Date    Cancer of kidney ([address]     Dystonic tremor     left arm    GERD (gastroesophageal reflux disease)     PRN meds    H/O complete eye exam     [facility]    Herpes zoster     Hypertension     Hypothyroidism     Left renal mass 11/3/2017    Solitary right kidney    ,   Past Surgical History:   Procedure Laterality Date    COLONOSCOPY  2010    with EGD    CT BIOPSY RENAL  9/27/2017    CT BIOPSY RENAL 9/27/2017 [facility] CT SCAN    KIDNEY REMOVAL Left 11/03/2017    renal cell cancer    NEPHRECTOMY Left 11/03/2017    PETER AND BSO (CERVIX REMOVED)        Medications. : Reviewed in chart  Allergies: No Known Allergies     SUBJECTIVE     Pt reports significant pain today. Her  states \"we had a bad morning\" which he reports is related to both pt's Parkinson's and also her pain in her hand. Pain in the palm and fingers increasing.       OBJECTIVE   \  Digital ROM:   LF 12/22/22 RF 12/22/22         MCP 84/96 A  75 P ext 92/76 A  75 P ext          PIP 72/90 A  50 P ext 71/100 A 36 P ext          DIP 0/26 A 0/4 A     Active flexion to [facility]         Treatment:    Manual Treatment: x20 min:    Moist hot pack to left hand with gentle stretch by therapist   Gentle progressive PROM RF/MF (sustained)   Massage along RF and MF flexor tendons (significant pain/discomfort today)  Pt placed in as much possible MP extension within current orthosis  Coban wrap onto hand to prevent from flexing through straps       Therapeutic exercise (08053) x 25 min   Finger extension activity with small foam blocks  Used as prehensile/functional grasping screen to assess ability for patient to grasp functional with current level of spasticity/contracture of RF/MF  Pt has increased dyskinesia/pill-rolling type movements in uninvolved RUE with intentional activity with L hand     . ASSESSMENT     Pt comes in today with increased pain in her palm and fingers. She is unable to tolerate full time wear of contracture-preventing extension splint due to the following: extensive reported pain as well as pt involuntarily flexing through and breaking straps dorsally. We discussed that she may require another option to prevent contracture (ie pre-bon splint), will further discuss with Riya Brown during tomorrow's follow-up after Dr. Kevin Arredondo. Pt demonstrating symptoms that resemble neurological origin as opposed to orthopedic. She does also have diagnosis of Parkinson's which affects her coordination and motor planning in general. IP's of affected fingers are generally straight in comparison to MP joints. PLAN       Wear orthosis as much as possible, try to remold and gain extension next visit   STM to release tension in ring and middle fingers   Take measurements!! Pre and post tx     GOALS     Short term goals: 1/13/2023  (4 weeks)  Patient and  will be I with HEP and precautions. Pt and  will be I with orthosis jazmin/doff, wear, care, precautions, rationale for use (progressing,  and pt will most likely require continuous education on donning orthosis)   Increase AROM of affected MF,RF MP extension to at least 45 ° to improve ability to open hand to obtain objects.   Increase AROM of affected MF, RF PIP flexion to at least 65 ° to improve ability to grasp. Improve Quick DASH functional assessment score from less than 65% deficit. Long term goals: 3/10/2023  (12 weeks)   Pt will be able to use the affected UE for all ADL's without difficulty. Pt will have adequate strength to be able to hold and open containers without difficulty. Pt will be able to make a full composite fist with the involved hand to enable to grasp and hold objects. Pt will have full active composite extension of affected side to be able to open hand to acquire items for ADL's. Pt will lack 25°  or less of MP extension L MF/RF. Improve Quick DASH functional assessment score from less than 20% deficit.       [facility] Portal     OT Protocols

## 2023-01-10 ENCOUNTER — OFFICE VISIT (OUTPATIENT)
Dept: ORTHOPEDIC SURGERY | Age: 73
End: 2023-01-10
Payer: MEDICARE

## 2023-01-10 DIAGNOSIS — M65.342 TRIGGER FINGER, LEFT RING FINGER: ICD-10-CM

## 2023-01-10 DIAGNOSIS — M65.332 TRIGGER FINGER, LEFT MIDDLE FINGER: ICD-10-CM

## 2023-01-10 DIAGNOSIS — M25.642 STIFFNESS OF FINGER JOINT OF LEFT HAND: ICD-10-CM

## 2023-01-10 DIAGNOSIS — M79.642 LEFT HAND PAIN: Primary | ICD-10-CM

## 2023-01-10 PROCEDURE — 97110 THERAPEUTIC EXERCISES: CPT | Performed by: OCCUPATIONAL THERAPIST

## 2023-01-10 PROCEDURE — 97140 MANUAL THERAPY 1/> REGIONS: CPT | Performed by: OCCUPATIONAL THERAPIST

## 2023-01-11 ENCOUNTER — OFFICE VISIT (OUTPATIENT)
Dept: ORTHOPEDIC SURGERY | Age: 73
End: 2023-01-11

## 2023-01-11 ENCOUNTER — OFFICE VISIT (OUTPATIENT)
Dept: ORTHOPEDIC SURGERY | Age: 73
End: 2023-01-11
Payer: MEDICARE

## 2023-01-11 DIAGNOSIS — M25.642 STIFFNESS OF FINGER JOINT OF LEFT HAND: Primary | ICD-10-CM

## 2023-01-11 DIAGNOSIS — M65.342 TRIGGER FINGER, LEFT RING FINGER: ICD-10-CM

## 2023-01-11 DIAGNOSIS — M65.332 TRIGGER FINGER, LEFT MIDDLE FINGER: ICD-10-CM

## 2023-01-11 DIAGNOSIS — M79.642 LEFT HAND PAIN: ICD-10-CM

## 2023-01-11 PROCEDURE — 1123F ACP DISCUSS/DSCN MKR DOCD: CPT | Performed by: ORTHOPAEDIC SURGERY

## 2023-01-11 PROCEDURE — 99214 OFFICE O/P EST MOD 30 MIN: CPT | Performed by: ORTHOPAEDIC SURGERY

## 2023-01-11 NOTE — PROGRESS NOTES
Patient was fitted and instructed on use of the TFO hand orthosis for patient's left hand by Gamal Willett in OT. She explained the hand slides into the webbing opening with the brace resting in the palm of the hand. She explained the straps around the wrist/forearm should be tightened comfortably to limit motion. Patient read and signed documenting they understand and agree to [facility]'s current DME return policy.

## 2023-01-11 NOTE — LETTER
DME Patient Authorization Form    Name: Abundio Bejarano  : 1950  MRN: [id number]   Age: 74 y.o. Gender: female  Delivery Address: [facility]     Diagnosis:     ICD-10-CM    1. Stiffness of finger joint of left hand  M25.642 Wrist/Thumb Spica ()      2. Left hand pain  M79.642 Wrist/Thumb Spica ()           Requested DME:  TFO- ($267.00) X 1 - left        Clinical Notes:     **Indicates non-covered items by insurance. Payment expected on date of service. Electronically signed by  Provider: Marilee Medina OT__Date: 2023                            [address] Tax ID # [id number]        Durable Medical Equipment and/or Orthotics Patient Consent     I understand that my physician has prescribed this medical supply as part of my treatment plan as a matter of Medical Necessity.  I understand that I have a choice in where I receive my prescribed orthopedic supplies and/or services.  I authorize [facility] to furnish this service/product and to provide my insurance carrier with any information requested in order to process for payment.  I instruct my insurance carrier to pay [facility] directly for these services/products.  I understand that my insurance carrier may deny payment for this supply because it is a non-covered item, deemed not medically necessary or considered experimental.   I understand that any cost not covered by my insurance carrier will be solely my financial responsibility.  I have received the Supplier Standards and have reviewed them.  I have received the prescribed item and have been fully instructed on the proper use of the above services/products.    ______ (Patient Initials) I understand that all DME items are non-returnable after being dispensed. Items still in sealed packaging may be returned up to 14 days after purchasing. [facility] will replace items that are defective.    ______ (Patient Initials) I understand that [facility] will not file a claim with my insurance carrier for this service/product and I am waiving my right to file a claim on my own for this service/product with my insurance company as this item is NON-COVERED (Denoted by the **) by my Insurance company/policy.    ______ (Patient Initials) I understand that I am responsible to bring my equipment to the hospital for any surgery.      ______________________________________________  ________________________  Patient / Guarantor Signature          Date      Inventory Stickers            Thank you for considering [facility].  Your physician has prescribed specific medical equipment or devices for your home use.  The following describes your rights and responsibilities as our customer.  Right to Choose Providers:  You have a choice regarding which company supplies your home medical equipment and devices, and to consult your physician in this decision.  You may choose a medical supply store, a home medical equipment provider, or a specialist such as [facility].  [facility] will coordinate with your physician to provide the medical equipment or devices prescribed for your home use.    Right to Service:  You have the right to considerate, respectful and nondiscriminatory care.  You have the right to receive accurate and easily understood information about your health care.  If you speak a foreign language, or don't understand the discussions, assistance will be provided to allow you to make informed health care decisions.  You have the right to know your treatment options and to participate in decisions about your care, including the right to accept or refuse treatment.  You have the right to expect a reasonable response to your requests for treatment or service.   You have the right to talk in confidence with health care providers and to have your health care information protected. You have the right to receive an explanation of your bill. You have the right to complain about the service or product you receive. Patient Responsibilities:  Please provide complete and accurate information about your health insurance benefits and make arrangements for the timely payment of your bill. [facility] will, if possible, assume responsibility for billing your insurance (Medicare, Medicaid and commercial) for the prescribed equipment or devices. If your policy does not cover the prescribed product, or only covers a portion of the bill, you are responsible for any remaining balance. Return and Exchange Policy:  [facility] will honor published  Warranties for products. [facility] will accept returns or exchanges within 14 days from the date of receipt, providin) the product must be in new condition; 2) receipt as required; and 3) used disposable and hygiene products may only be returned due to a defective product. Note: Refunds will be issued in a timely manner, please allow 4-6 weeks for processing. Complaint Procedures and [facility] Consumer Protection Resources:  [facility] values you as a customer, and is committed to resolving patient concerns. This commitment includes understanding and documenting your concerns, conducting a review of internal procedures, and providing you with an explanation and resolution to your concerns. Should you have any questions about our services or billing process, please contact our office at (practice phone number). If we are unable to resolve the concern, you have the right to direct comments to the office of Consumer Protection, in the [id number] [place], [place] [address] or the [facility] office, without fear of repercussion.     DMEPOS SUPPLIER STANDARDS    A supplier must be in compliance with all applicable Federal and [address] licensure and regulatory requirements. A supplier must provide complete and accurate information on the DMEPOS supplier application. Any changes to this information must be reported to the [facility] within 30 days. An authorized individual (one whose signature is binding) must sign the application for billing privileges. A supplier must fill orders from its own inventory, or must contract with other companies for the purchase of items necessary to fill the order. A supplier may not contract with any entity that is currently excluded from the Medicare program, any [facility] program, or from any other Federal procurement or Nonprocurement programs. A supplier must advise beneficiaries that they may rent or purchase inexpensive or routinely purchased durable medical equipment, and of the purchase option for capped rental equipment. A supplier must notify beneficiaries of warranty coverage and honor all warranties under applicable State Law, and repair or replace free of charge Medicare covered items that are under warranty. A supplier must maintain a physical facility on an appropriate site. A supplier must permit CMS, or its agents to conduct on-site inspections to ascertain the supplier's compliance with these standards. The supplier location must be accessible to beneficiaries during reasonable business hours, and must maintain a visible sign and posted hours of operation. A supplier must maintain a primary business telephone listed under the name of the business in a [employer] or a toll free number available through directory assistance. The exclusive use of a beeper, answering machine or cell phone is prohibited. A supplier must have comprehensive liability insurance in the amount of at least $300,000 that covers both the supplier's place of business and all customers and employees of the supplier.   If the supplier manufactures its own items, this insurance must also cover product liability and completed operations. A supplier must agree not to initiate telephone contact with beneficiaries, with a few exceptions allowed. This standard prohibits suppliers from calling beneficiaries in order to solicit new business. A supplier is responsible for delivery and must instruct beneficiaries on use of Medicare covered items, and maintain proof of delivery. A supplier must answer questions, and respond to complaints of the beneficiaries, and maintain documentation of such contacts. A supplier must maintain and replace at no charge or repair directly, or through a service contract with another company, Medicare covered items it has rented to beneficiaries. A supplier must accept returns of substandard (less than full quality for the particular item) or unsuitable items (inappropriate for the beneficiary at the time it was fitted and rented or sold) from beneficiaries. A supplier must disclose these supplier standards to each beneficiary to whom it supplies a Medicare-covered item. A supplier must disclose to the government any person having ownership, financial, or control interest in the supplier. A supplier must not convey or reassign a supplier number; i.e., the supplier may not sell or allow another entity to use its Medicare billing number. A supplier must have a complaint resolution protocol established to address beneficiary complaints that relate to these standards. A record of these complaints must be maintained at the physical facility. Complaint records must include: the name, address, telephone number and health insurance claim number of the beneficiary, a summary of the complaint, and any action taken to resolve it. A supplier must agree to furnish CMS any information required by the Medicare statute and implementing regulations.   A supplier of DMEPOS and other items and services must be accredited by a CMS-approved accreditation organization in order to receive and retain a supplier billing number. The accreditation must indicate the specific products and services, for which the supplier is accredited in order for the supplier to receive payment for those specific products and services. A DMEPOS supplier must notify their accreditation organization when a new DMEPOS location is opened. The accreditation organization may accredit the new supplier location for three months after it is operational without requiring a new site visit. All DMEPOS supplier locations, whether owned or subcontracted, must meet the [employer] and be separately accredited in order to bill Medicare. An accredited supplier may be denied enrollment or their enrollment may be revoked, if CMS determines that they are not in compliance with the DMEPOS quality standards. A DMEPOS supplier must disclose upon enrollment all products and services, including the addition of new product lines for which they are seeking accreditation. If a new product line is added after enrollment, the DMEPOS supplier will be responsible for notifying the accrediting body of the new product so that the DMEPOS supplier can be re-surveyed and accredited for these new products. Must meet the surety bond requirements specified in 42 C. F.R. 424.57(c). Implementation date- May 4, 2009. A supplier must obtain oxygen from a state-licensed oxygen supplier. A supplier must maintain ordering and referring documentation consistent with provisions found in 42 C. F.R. 424.516(f). DMEPOS suppliers are prohibited from sharing a practice location with certain other Medicare providers and suppliers. DMEPOS suppliers must remain open to the public for a minimum of 30 hours per week with certain exceptions.

## 2023-01-11 NOTE — PROGRESS NOTES
Orthopaedic Hand Clinic Note    Name: Augie Lambert  Age: 74 y.o. YOB: 1950  Gender: female  MRN: [id number]      Follow up visit:   1. Stiffness of finger joint of left hand    2. Trigger finger, left ring finger    3. Trigger finger, left middle finger        HPI: Augie Lambert is a 74 y.o. female who is following up for left hand contracture and trigger fingers, the contracture slightly improved, the fingers are no longer locked in flexion but she still has significant issues, on good days she is able make a fist sometimes she swells a lot and this is mainly due to the straps from the splint. ROS/Meds/PSH/PMH/FH/SH: I personally reviewed the patients standard intake form. Pertinents are discussed in the HPI    Physical Examination:  General: Awake and alert. HEENT: Normocephalic, atraumatic  CV/Pulm: Breathing even and unlabored  Skin: No obvious rashes noted. Lymphatic: No obvious evidence of lymphedema or lymphadenopathy    Musculoskeletal Examination:  Examination on the left upper extremity demonstrates cap refill < 5 seconds in all fingers, 45 degree contracture at the MCP joint of the middle and ring fingers of the left hand, 10 degree contracture at the PIP joints, tenderness to palpation throughout the A1 pulley area, significant pain with attempted extension of the fingers. Imaging / Electrodiagnostic Tests:     none    Assessment:   1. Stiffness of finger joint of left hand    2. Trigger finger, left ring finger    3. Trigger finger, left middle finger        Plan:   We discussed the diagnosis and different treatment options. We discussed observation, therapy, antiinflammatory medications and other pertinent treatment modalities.     After discussing in detail the patient elects to proceed with continued hand therapy and bracing, I discussed the case with a hand therapist in order to modify the splint and cause less swelling, I advised the patient to contact her neurologist at [facility] in order to get in for an assessment, I do believe at least in part her issues are caused by spasticity and also in part also by trigger fingers.  I will reassess the patient in 2 months.     Patient voiced accordance and understanding of the information provided and the formulated plan. All questions were answered to the patient's satisfaction during the encounter.    Dayne Busch MD  Orthopaedic Surgery  01/11/23  2:03 PM

## 2023-01-11 NOTE — PROGRESS NOTES
[facility] [address] [address]  Dept: [phone]      Occupational Therapy Daily Note     Referring MD: Ever Parry MD    Diagnosis:     ICD-10-CM    1. Stiffness of finger joint of left hand  M25.642 Wrist/Thumb Spica ()     Wrist/Thumb Spica ()      2. Left hand pain  M79.642 Wrist/Thumb Spica ()     Wrist/Thumb Spica ()             Surgery: Date NA     Therapy precautions: None    History of injury/onset : 4 weeks ago    Total Direct Treatment Time: 30 min                       Total In Office Time: 40 min    Preferred Name:  Andres Music HISTORY     PMHX & Meds:   Past Medical History:   Diagnosis Date    Cancer of kidney ([address]     Dystonic tremor     left arm    GERD (gastroesophageal reflux disease)     PRN meds    H/O complete eye exam     [facility]. [facility]    Herpes zoster     Hypertension     Hypothyroidism     Left renal mass 11/3/2017    Solitary right kidney    ,   Past Surgical History:   Procedure Laterality Date    COLONOSCOPY  2010    with EGD    CT BIOPSY RENAL  9/27/2017    CT BIOPSY RENAL 9/27/2017 [facility]Y CT SCAN    KIDNEY REMOVAL Left 11/03/2017    renal cell cancer    NEPHRECTOMY Left 11/03/2017    PETER AND BSO (CERVIX REMOVED)        Medications. : Reviewed in chart  Allergies: No Known Allergies     SUBJECTIVE     Pt reports pain in her orthosis. Trouble with compliance due to discomfort. OBJECTIVE   \  Digital ROM:   LF 12/22/22 RF 12/22/22         MCP 84/96 A  75 P ext 92/76 A  75 P ext          PIP 72/90 A  50 P ext 71/100 A 36 P ext          DIP 0/26 A 0/4 A     Active flexion to [facility]         Treatment:    .     Orthotics Management and Training Subsequent Encounter (41241) 30 min:  Management and training including assessment and fitting of the upper extremity, initial encounter  Fitting and instruction of new prefab resting hand splint  Adjustments and trial for proper amount MP flexion  Memory foam added to dorsal strap across MPJ      ASSESSMENT     Patient and  verbalizing agreement and pleasure with new orthosis.  Both are able to jazmin/doff I'ly following instruction.  Patient is comfortable without any pain in this orthosis.  Edema significant in the fingers and dorsal hand, min/mod.      PLAN         Orthosis check, referral to neurology for assessment/work up.  Adjust extension in orthosis.  GOALS     Short term goals: 1/13/2023  (4 weeks)  Patient and  will be I with HEP and precautions.  Pt and  will be I with orthosis jazmin/doff, wear, care, precautions, rationale for use (progressing,  and pt will most likely require continuous education on donning orthosis)   Increase AROM of affected MF,RF MP extension to at least 45 ° to improve ability to open hand to obtain objects.  Increase AROM of affected MF, RF PIP flexion to at least 65 ° to improve ability to grasp.  Improve Quick DASH functional assessment score from less than 65% deficit.    Long term goals: 3/10/2023  (12 weeks)   Pt will be able to use the affected UE for all ADL's without difficulty.  Pt will have adequate strength to be able to hold and open containers without difficulty.  Pt will be able to make a full composite fist with the involved hand to enable to grasp and hold objects.  Pt will have full active composite extension of affected side to be able to open hand to acquire items for ADL's.  Pt will lack 25°  or less of MP extension L MF/RF.  Improve Quick DASH functional assessment score from less than 20% deficit.      [employer] Portal     OT Protocols

## 2023-01-17 ENCOUNTER — NURSE TRIAGE (OUTPATIENT)
Dept: OTHER | Facility: CLINIC | Age: 73
End: 2023-01-17

## 2023-01-17 NOTE — TELEPHONE ENCOUNTER
Location of patient: [place]    Received call from [place] at [employer] [facility] [employer]. Subjective: Caller (spouse/patient)  states \"I have pain from my stomach that goes around to my side. \"     Current Symptoms: Right abdominal pain that radiates to right flank, burning with urination, frequent/urgent urination, fatigue    Denies: Bleeding, N/V, or leg weakness    Onset: 1 week ago; gradual - worsening    Associated Symptoms: reduced activity, increased wakefulness due to pain    Pain Severity: 6/10; sharp; intermittent - abdominal/flank pain    Temperature: denies fever but cold lately    What has been tried: na    LMP: NA Pregnant: NA    Recommended disposition: Go to ED Now; pt refusing to go to the ER wants to be seen by PCP    Care advice provided, patient verbalizes understanding; denies any other questions or concerns; instructed to call back for any new or worsening symptoms. Writer provided warm transfer to Radha Osborn at [facility] for ED refusal/provider disposition    Attention Provider: Thank you for allowing me to participate in the care of your patient. The patient was connected to triage in response to information provided to the ECC/PSC. Please do not respond through this encounter as the response is not directed to a shared pool.     Reminders:     Call [facility] Provider Office    Care Advice - both instruction and documentation    Routing - PCP (and NP for 2nd level triage)    PLEASE DELETE ALL RED TEXT PRIOR TO SIGNING NOTE    Reason for Disposition   Abdominal pain and age > 60 years    Protocols used: Flank Pain-ADULT-[place]

## 2023-01-26 ENCOUNTER — OFFICE VISIT (OUTPATIENT)
Age: 73
End: 2023-01-26
Payer: MEDICARE

## 2023-01-26 DIAGNOSIS — M79.642 LEFT HAND PAIN: Primary | ICD-10-CM

## 2023-01-26 DIAGNOSIS — M25.642 STIFFNESS OF FINGER JOINT OF LEFT HAND: ICD-10-CM

## 2023-01-26 PROCEDURE — 97110 THERAPEUTIC EXERCISES: CPT | Performed by: OCCUPATIONAL THERAPIST

## 2023-01-26 NOTE — PROGRESS NOTES
[facility] En[facility] Manger  [address] [place] [address]  Dept: [phone]      Occupational Therapy Daily Note     Referring MD: Pilar Joshua MD    Diagnosis:     ICD-10-CM    1. Left hand pain  M79.642       2. Stiffness of finger joint of left hand  M25.642           Surgery: Date NA     Therapy precautions: None    History of injury/onset : 4 weeks ago    Total Direct Treatment Time: 55 min                       Total In Office Time: 55 min    Preferred Name:  Jesús Ornelas HISTORY     PMHX & Meds:   Past Medical History:   Diagnosis Date    Cancer of kidney ([address]     Dystonic tremor     left arm    GERD (gastroesophageal reflux disease)     PRN meds    H/O complete eye exam     Dr. [facility]    Herpes zoster     Hypertension     Hypothyroidism     Left renal mass 11/3/2017    Solitary right kidney    ,   Past Surgical History:   Procedure Laterality Date    COLONOSCOPY  2010    with EGD    CT BIOPSY RENAL  9/27/2017    CT BIOPSY RENAL 9/27/2017 [facility]Y CT SCAN    KIDNEY REMOVAL Left 11/03/2017    renal cell cancer    NEPHRECTOMY Left 11/03/2017    PETER AND BSO (CERVIX REMOVED)        Medications. : Reviewed in chart  Allergies: No Known Allergies     SUBJECTIVE     Pt reports she is tolerating her new orthosis very well. Able to sleep in it and is pleased with the progress with finger extension.     OBJECTIVE   \  Digital ROM:   LF 12/22/22 RF 12/22/22         MCP 84/96 A  75 P ext 92/76 A  75 P ext          PIP 72/90 A  50 P ext 71/100 A 36 P ext          DIP 0/26 A 0/4 A     Active flexion to [facility]         Treatment:    Therapeutic exercise (74987) x 55 min   Finger extension/Flexion activity with pegs (composite flexion and extension)  Used as prehensile/functional grasping screen to assess ability for patient to grasp functional with current level of spasticity/contracture of RF/MF  RMO extension RF fabricated from [employer] trial for MP extension and IP flexion recruitment  PROM by therapist for F/E hand  More memory foam to volar hand to improve extension placement in orthosis  . ASSESSMENT     Patient and  verbalizing agreement and pleasure with new orthosis. Significant ROM improvements observed this date in finger extension and active flexion. RMO RF Extension trial- patient and  instructed to remove often for skin checks. Still seems to be a neurological component to the presentation. Has neuro appointment tomorrow. PLAN         Orthosis check, referral to neurology for assessment/work up. Adjust extension in orthosis. PROM and stretching, functional hand activities. GOALS     Short term goals: 1/13/2023  (4 weeks)  Patient and  will be I with HEP and precautions. Pt and  will be I with orthosis jazmin/doff, wear, care, precautions, rationale for use (progressing,  and pt will most likely require continuous education on donning orthosis)   Increase AROM of affected MF,RF MP extension to at least 45 ° to improve ability to open hand to obtain objects. Increase AROM of affected MF, RF PIP flexion to at least 65 ° to improve ability to grasp. Improve Quick DASH functional assessment score from less than 65% deficit. Long term goals: 3/10/2023  (12 weeks)   Pt will be able to use the affected UE for all ADL's without difficulty. Pt will have adequate strength to be able to hold and open containers without difficulty. Pt will be able to make a full composite fist with the involved hand to enable to grasp and hold objects. Pt will have full active composite extension of affected side to be able to open hand to acquire items for ADL's. Pt will lack 25°  or less of MP extension L MF/RF. Improve Quick DASH functional assessment score from less than 20% deficit.       [facility] Portal     OT Protocols

## 2023-01-30 RX ORDER — MELOXICAM 15 MG/1
TABLET ORAL
Qty: 42 TABLET | Refills: 0 | OUTPATIENT
Start: 2023-01-30

## 2023-02-06 RX ORDER — LEVOTHYROXINE SODIUM 112 UG/1
TABLET ORAL
Qty: 90 TABLET | Refills: 1 | OUTPATIENT
Start: 2023-02-06

## 2023-02-08 ENCOUNTER — OFFICE VISIT (OUTPATIENT)
Dept: FAMILY MEDICINE CLINIC | Facility: CLINIC | Age: 73
End: 2023-02-08
Payer: MEDICARE

## 2023-02-08 VITALS
HEIGHT: 62 IN | SYSTOLIC BLOOD PRESSURE: 124 MMHG | BODY MASS INDEX: 22.93 KG/M2 | WEIGHT: 124.6 LBS | TEMPERATURE: 97.2 F | OXYGEN SATURATION: 95 % | DIASTOLIC BLOOD PRESSURE: 66 MMHG | HEART RATE: 79 BPM

## 2023-02-08 DIAGNOSIS — N18.4 CKD (CHRONIC KIDNEY DISEASE) STAGE 4, GFR 15-29 ML/MIN (HCC): ICD-10-CM

## 2023-02-08 DIAGNOSIS — M65.332 TRIGGER FINGER, LEFT MIDDLE FINGER: ICD-10-CM

## 2023-02-08 DIAGNOSIS — E03.9 ACQUIRED HYPOTHYROIDISM: ICD-10-CM

## 2023-02-08 DIAGNOSIS — G20 PARKINSON DISEASE (HCC): ICD-10-CM

## 2023-02-08 DIAGNOSIS — C64.2 RENAL CELL CANCER, LEFT (HCC): ICD-10-CM

## 2023-02-08 DIAGNOSIS — R30.0 DYSURIA: Primary | ICD-10-CM

## 2023-02-08 LAB
BILIRUBIN, URINE, POC: NEGATIVE
BLOOD URINE, POC: NEGATIVE
GLUCOSE URINE, POC: NEGATIVE
KETONES, URINE, POC: NEGATIVE
LEUKOCYTE ESTERASE, URINE, POC: ABNORMAL
NITRITE, URINE, POC: POSITIVE
PH, URINE, POC: 5.5 (ref 4.6–8)
PROTEIN,URINE, POC: NEGATIVE
SPECIFIC GRAVITY, URINE, POC: >=1.03 (ref 1–1.03)
URINALYSIS CLARITY, POC: ABNORMAL
URINALYSIS COLOR, POC: YELLOW
UROBILINOGEN, POC: 0.2

## 2023-02-08 PROCEDURE — 99214 OFFICE O/P EST MOD 30 MIN: CPT | Performed by: FAMILY MEDICINE

## 2023-02-08 PROCEDURE — 1123F ACP DISCUSS/DSCN MKR DOCD: CPT | Performed by: FAMILY MEDICINE

## 2023-02-08 PROCEDURE — 3074F SYST BP LT 130 MM HG: CPT | Performed by: FAMILY MEDICINE

## 2023-02-08 PROCEDURE — 81003 URINALYSIS AUTO W/O SCOPE: CPT | Performed by: FAMILY MEDICINE

## 2023-02-08 PROCEDURE — 3078F DIAST BP <80 MM HG: CPT | Performed by: FAMILY MEDICINE

## 2023-02-08 RX ORDER — LEVOTHYROXINE SODIUM 112 UG/1
112 TABLET ORAL DAILY
Qty: 90 TABLET | Refills: 1 | Status: SHIPPED | OUTPATIENT
Start: 2023-02-08 | End: 2023-02-09 | Stop reason: SDUPTHER

## 2023-02-08 RX ORDER — SULFAMETHOXAZOLE AND TRIMETHOPRIM 800; 160 MG/1; MG/1
1 TABLET ORAL 2 TIMES DAILY
Qty: 10 TABLET | Refills: 0 | Status: SHIPPED | OUTPATIENT
Start: 2023-02-08 | End: 2023-02-13

## 2023-02-08 SDOH — ECONOMIC STABILITY: HOUSING INSECURITY
IN THE LAST 12 MONTHS, WAS THERE A TIME WHEN YOU DID NOT HAVE A STEADY PLACE TO SLEEP OR SLEPT IN A SHELTER (INCLUDING NOW)?: NO

## 2023-02-08 SDOH — ECONOMIC STABILITY: FOOD INSECURITY: WITHIN THE PAST 12 MONTHS, THE FOOD YOU BOUGHT JUST DIDN'T LAST AND YOU DIDN'T HAVE MONEY TO GET MORE.: NEVER TRUE

## 2023-02-08 SDOH — ECONOMIC STABILITY: FOOD INSECURITY: WITHIN THE PAST 12 MONTHS, YOU WORRIED THAT YOUR FOOD WOULD RUN OUT BEFORE YOU GOT MONEY TO BUY MORE.: NEVER TRUE

## 2023-02-08 SDOH — ECONOMIC STABILITY: INCOME INSECURITY: HOW HARD IS IT FOR YOU TO PAY FOR THE VERY BASICS LIKE FOOD, HOUSING, MEDICAL CARE, AND HEATING?: NOT HARD AT ALL

## 2023-02-08 ASSESSMENT — PATIENT HEALTH QUESTIONNAIRE - PHQ9
1. LITTLE INTEREST OR PLEASURE IN DOING THINGS: 1
SUM OF ALL RESPONSES TO PHQ QUESTIONS 1-9: 2
SUM OF ALL RESPONSES TO PHQ QUESTIONS 1-9: 2
SUM OF ALL RESPONSES TO PHQ9 QUESTIONS 1 & 2: 2
2. FEELING DOWN, DEPRESSED OR HOPELESS: 1
SUM OF ALL RESPONSES TO PHQ QUESTIONS 1-9: 2
SUM OF ALL RESPONSES TO PHQ QUESTIONS 1-9: 2

## 2023-02-08 ASSESSMENT — ENCOUNTER SYMPTOMS
SINUS PAIN: 0
EYE DISCHARGE: 0
ABDOMINAL PAIN: 0
DIARRHEA: 0
CONSTIPATION: 0
COUGH: 0
SHORTNESS OF BREATH: 0
CHEST TIGHTNESS: 0

## 2023-02-08 NOTE — PROGRESS NOTES
Ulysses Brito is a 74 y.o. female who presents with   Chief Complaint   Patient presents with    Flank Pain     Right side, x2 weeks. Hx cyst, renal cell Ca. Dysuria. Tried to get into urology but did not return calls. Hypothyroidism     Has been out of meds x2 days. Needs refills. Had TSH in December. History of Present Illness  Pt with hx L nephrectomy for renal cancer about 5 years ago and R renal cyst. URI recently - still some nasal congestion. Last CT with no evidence of recurrence 2018. Occas burning with urination. Mild R flank discomfort yesterday. Seeing ortho for contractures R hand. Awaiting Botox to help with this. Linzess helping constipation. Review of Systems  Review of Systems   Constitutional:  Negative for appetite change, fatigue and fever. HENT:  Negative for congestion, ear pain and sinus pain. Eyes:  Negative for discharge. Respiratory:  Negative for cough, chest tightness and shortness of breath. Cardiovascular:  Negative for chest pain, palpitations and leg swelling. Gastrointestinal:  Negative for abdominal pain, constipation and diarrhea. Genitourinary:  Positive for dysuria and frequency. Musculoskeletal:  Negative for joint swelling. Skin:  Negative for rash. Neurological:  Positive for tremors. Negative for headaches. Hematological:  Negative for adenopathy. Psychiatric/Behavioral:  Negative for dysphoric mood. The patient is not nervous/anxious.        Medications  Current Outpatient Medications   Medication Sig Dispense Refill    levothyroxine (SYNTHROID) 112 MCG tablet Take 1 tablet by mouth daily 90 tablet 1    sulfamethoxazole-trimethoprim (BACTRIM DS;SEPTRA DS) 800-160 MG per tablet Take 1 tablet by mouth 2 times daily for 5 days 10 tablet 0    midodrine (PROAMATINE) 10 MG tablet Take 1 tablet by mouth 3 times daily 90 tablet 3    mirtazapine (REMERON) 7.5 MG tablet TAKE 1 TABLET BY MOUTH EVERYDAY AT BEDTIME 30 tablet 3    Handicap Placard MISC by Does not apply route 1 each 0    linaclotide (LINZESS) 145 MCG capsule Take 1 capsule by mouth every morning (before breakfast) 30 capsule 3    acetaminophen (TYLENOL) 500 MG tablet Take 1,000 mg by mouth every 6 hours as needed for Pain.      losartan (COZAAR) 50 MG tablet Take 1 tablet by mouth daily 90 tablet 3    carbidopa-levodopa (SINEMET)  MG per tablet TAKE 1 TABLET BY MOUTH THREE TIMES A  tablet 3    rosuvastatin (CRESTOR) 20 MG tablet Take 20 mg by mouth daily 90 tablet 3     No current facility-administered medications for this visit.         Past Medical History  Past Medical History:   Diagnosis Date    Cancer of kidney ([address]     Dystonic tremor     left arm    GERD (gastroesophageal reflux disease)     PRN meds    H/O complete eye exam     Dr. [facility]    Herpes zoster     Hypertension     Hypothyroidism     Left renal mass 11/3/2017    Solitary right kidney        Surgical History  Past Surgical History:   Procedure Laterality Date    COLONOSCOPY  2010    with EGD    CT BIOPSY RENAL  9/27/2017    CT BIOPSY RENAL 9/27/2017 [facility] CT SCAN    KIDNEY REMOVAL Left 11/03/2017    renal cell cancer    NEPHRECTOMY Left 11/03/2017    PETER AND BSO (CERVIX REMOVED)            Family History  Family History   Problem Relation Age of Onset    Elevated Lipids Father     Osteoporosis Mother     Heart Disease Mother     Heart Disease Father     No Known Problems Sister     Diabetes Son 15        type 1       Social History  Social History     Socioeconomic History    Marital status:      Spouse name: Not on file    Number of children: Not on file    Years of education: Not on file    Highest education level: Not on file   Occupational History    Not on file   Tobacco Use    Smoking status: Never    Smokeless tobacco: Never   Vaping Use    Vaping Use: Never used   Substance and Sexual Activity    Alcohol use: Not Currently    Drug use: Never    Sexual activity: Not on file   Other Topics Concern    Not on file   Social History Narrative    Not on file     Social Determinants of Health     Financial Resource Strain: Low Risk     Difficulty of Paying Living Expenses: Not hard at all   Food Insecurity: No Food Insecurity    Worried About [address] in the Last Year: Never true    [address] [facility] [address] in the Last Year: Never true   Transportation Needs: Unknown    Lack of Transportation (Medical): Not on file    Lack of Transportation (Non-Medical): No   Physical Activity: Inactive    Days of Exercise per Week: 0 days    Minutes of Exercise per Session: 0 min   Stress: Not on file   Social Connections: Not on file   Intimate Partner Violence: Not on file   Housing Stability: Unknown    Unable to Pay for Housing in the Last Year: Not on file    Number of Places Lived in the Last Year: Not on file    Unstable Housing in the Last Year: No       Social History     Tobacco Use   Smoking Status Never   Smokeless Tobacco Never       Allergies  No Known Allergies    Vital Signs  Body mass index is 22.79 kg/m². Vitals:    02/08/23 1324   BP: 124/66   Pulse: 79   Temp: 97.2 °F (36.2 °C)   TempSrc: Temporal   SpO2: 95%   Weight: 124 lb 9.6 oz (56.5 kg)   Height: 5' 2\" (1.575 m)       Physical Exam  Physical Exam  Vitals reviewed. Constitutional:       Appearance: Normal appearance. HENT:      Head: Normocephalic. Right Ear: Tympanic membrane normal.      Left Ear: Tympanic membrane normal.      Nose: No congestion. Mouth/Throat:      Pharynx: No oropharyngeal exudate or posterior oropharyngeal erythema. Eyes:      Extraocular Movements: Extraocular movements intact. Conjunctiva/sclera: Conjunctivae normal.   Cardiovascular:      Rate and Rhythm: Normal rate and regular rhythm. Heart sounds: Normal heart sounds. No murmur heard. Pulmonary:      Effort: Pulmonary effort is normal.      Breath sounds: Normal breath sounds. No wheezing.    Musculoskeletal: General: No tenderness. Right lower leg: No edema. Left lower leg: No edema. Lymphadenopathy:      Cervical: No cervical adenopathy. Skin:     General: Skin is warm and dry. Findings: No rash. Neurological:      General: No focal deficit present. Mental Status: She is alert. Psychiatric:         Mood and Affect: Mood normal.         Thought Content: Thought content normal.        Assessment and Plan  Lobo Stewart was seen today for flank pain and hypothyroidism. Diagnoses and all orders for this visit:    Dysuria  -     AMB POC URINALYSIS DIP STICK AUTO W/O MICRO  -     sulfamethoxazole-trimethoprim (BACTRIM DS;SEPTRA DS) 800-160 MG per tablet; Take 1 tablet by mouth 2 times daily for 5 days  -     Culture, Urine; Future  -     Culture, Urine    Acquired hypothyroidism  -     levothyroxine (SYNTHROID) 112 MCG tablet; Take 1 tablet by mouth daily  -     TSH with Reflex; Future  -     TSH with Reflex    Trigger finger, left middle finger    Renal cell cancer, left (HCC)    CKD (chronic kidney disease) stage 4, GFR 15-29 ml/min (HCC)    Parkinson disease (HCC)  Await UC; call for further flank pain  Recheck TSH and adjust as needed  F/u with nephrology as scheduled    On this date I have spent 30 minutes reviewing previous notes, test results and face to face with the patient discussing the diagnosis and importance of compliance with the treatment plan as well as documenting on the day of the visit.

## 2023-02-09 ENCOUNTER — OFFICE VISIT (OUTPATIENT)
Age: 73
End: 2023-02-09
Payer: MEDICARE

## 2023-02-09 DIAGNOSIS — M65.342 TRIGGER FINGER, LEFT RING FINGER: ICD-10-CM

## 2023-02-09 DIAGNOSIS — E03.9 ACQUIRED HYPOTHYROIDISM: ICD-10-CM

## 2023-02-09 DIAGNOSIS — E03.9 ACQUIRED HYPOTHYROIDISM: Primary | ICD-10-CM

## 2023-02-09 DIAGNOSIS — M25.642 STIFFNESS OF FINGER JOINT OF LEFT HAND: ICD-10-CM

## 2023-02-09 DIAGNOSIS — M65.332 TRIGGER FINGER, LEFT MIDDLE FINGER: ICD-10-CM

## 2023-02-09 DIAGNOSIS — M79.642 LEFT HAND PAIN: Primary | ICD-10-CM

## 2023-02-09 LAB
T4 FREE SERPL-MCNC: 1.2 NG/DL (ref 0.78–1.46)
TSH W FREE THYROID IF ABNORMAL: <0.01 UIU/ML (ref 0.36–3.74)

## 2023-02-09 PROCEDURE — 97110 THERAPEUTIC EXERCISES: CPT | Performed by: OCCUPATIONAL THERAPIST

## 2023-02-09 PROCEDURE — 97760 ORTHOTIC MGMT&TRAING 1ST ENC: CPT | Performed by: OCCUPATIONAL THERAPIST

## 2023-02-09 RX ORDER — LEVOTHYROXINE SODIUM 88 UG/1
88 TABLET ORAL DAILY
Qty: 90 TABLET | Refills: 3 | Status: SHIPPED | OUTPATIENT
Start: 2023-02-09

## 2023-02-09 NOTE — PROGRESS NOTES
[facility] Mykeshirley Amaro  [address] [place] [id number]  Dept: [phone]      Occupational Therapy Daily Note     Referring MD: Porfirio Hoffmann MD    Diagnosis:     ICD-10-CM    1. Left hand pain  M79.642       2. Stiffness of finger joint of left hand  M25.642       3. Trigger finger, left ring finger  M65.342       4. Trigger finger, left middle finger  M65.332           Surgery: Date NA     Therapy precautions: None    History of injury/onset : 4 weeks ago    Total Direct Treatment Time: 40 min                       Total In Office Time: 50 min    Preferred Name:  Haven Alvarado HISTORY     PMHX & Meds:   Past Medical History:   Diagnosis Date    Cancer of kidney ([address]     Dystonic tremor     left arm    GERD (gastroesophageal reflux disease)     PRN meds    H/O complete eye exam     Dr. [facility]    Herpes zoster     Hypertension     Hypothyroidism     Left renal mass 11/3/2017    Solitary right kidney    ,   Past Surgical History:   Procedure Laterality Date    COLONOSCOPY  2010    with EGD    CT BIOPSY RENAL  9/27/2017    CT BIOPSY RENAL 9/27/2017 [facility] CT SCAN    KIDNEY REMOVAL Left 11/03/2017    renal cell cancer    NEPHRECTOMY Left 11/03/2017    PETER AND BSO (CERVIX REMOVED)        Medications. : Reviewed in chart  Allergies: No Known Allergies     SUBJECTIVE     Pt reports she is out of orthosis during the day to use the hand. Sleeping in orthosis. States that Botox has been ordered.     OBJECTIVE   \  Digital ROM:   LF 12/22/22 RF 12/22/22 LF 2/9/23 RF 2/9/23       MCP 84/96 A  75 P ext 92/76 A  75 P ext 36/85 55/91        PIP 72/90 A  50 P ext 71/100 A 36 P ext 29/79 16/80        DIP 0/26 A 0/4 A     Active flexion to [facility]         Treatment:    Therapeutic exercise (49247) x 15 min   Finger extension/Flexion activity with pegs (composite flexion and extension)  Used as prehensile/functional grasping screen to assess ability for patient to grasp functional with current level of spasticity/contracture of RF/MF  PROM by therapist for F/E hand  Planning OT POC discussion    . Orthotics Management and Training Subsequent Encounter ([id number]) 25 min:  Management and training including assessment and fitting of the upper extremity, initial encounter  Hand based finger extension orthosis    ASSESSMENT   Patient making very significant gains with finger ROM. Does not seem to be extrinsic tone present, but intrinsic. Trial of hand based extension orthosis versus WHFO. PLAN         Orthosis check, referral to neurology for assessment/work up. Adjust extension in orthosis. PROM and stretching, functional hand activities. GOALS     Short term goals: 1/13/2023  (4 weeks)  Patient and  will be I with HEP and precautions. Pt and  will be I with orthosis jazmin/doff, wear, care, precautions, rationale for use (progressing,  and pt will most likely require continuous education on donning orthosis)   Increase AROM of affected MF,RF MP extension to at least 45 ° to improve ability to open hand to obtain objects. Increase AROM of affected MF, RF PIP flexion to at least 65 ° to improve ability to grasp. Improve Quick DASH functional assessment score from less than 65% deficit. Long term goals: 3/10/2023  (12 weeks)   Pt will be able to use the affected UE for all ADL's without difficulty. Pt will have adequate strength to be able to hold and open containers without difficulty. Pt will be able to make a full composite fist with the involved hand to enable to grasp and hold objects. Pt will have full active composite extension of affected side to be able to open hand to acquire items for ADL's. Pt will lack 25°  or less of MP extension L MF/RF. Improve Quick DASH functional assessment score from less than 20% deficit.       [employer] Portal     OT Protocols

## 2023-02-10 LAB
BACTERIA SPEC CULT: ABNORMAL
SERVICE CMNT-IMP: ABNORMAL
T3 SERPL-MCNC: 0.97 NG/ML (ref 0.6–1.81)

## 2023-02-10 RX ORDER — CIPROFLOXACIN 250 MG/1
250 TABLET, FILM COATED ORAL 2 TIMES DAILY
Qty: 10 TABLET | Refills: 0 | Status: SHIPPED | OUTPATIENT
Start: 2023-02-10 | End: 2023-02-15

## 2023-02-23 ENCOUNTER — OFFICE VISIT (OUTPATIENT)
Age: 73
End: 2023-02-23

## 2023-02-23 DIAGNOSIS — M79.642 LEFT HAND PAIN: Primary | ICD-10-CM

## 2023-02-23 DIAGNOSIS — M25.642 STIFFNESS OF FINGER JOINT OF LEFT HAND: ICD-10-CM

## 2023-02-23 NOTE — PROGRESS NOTES
[facility] Danial Sahu  [address] [place] [id number]  Dept: [phone]      Occupational Therapy Daily Note     Referring MD: Mendoza Quintanilla MD    Diagnosis:     ICD-10-CM    1. Left hand pain  M79.642       2. Stiffness of finger joint of left hand  M25.642           Surgery: Date NA     Therapy precautions: None    History of injury/onset : 4 weeks ago    Total Direct Treatment Time: 45 min                       Total In Office Time: 50 min    Preferred Name:  Sun Mcgarry     Pt reports she is out of orthosis during the day to use the hand. Sleeping in orthosis. States that Botox has been ordered, but still has not received. Some discomfort with new hand based orthosis, cannot wear due to pain in the thumb region. OBJECTIVE   \  Digital ROM:   LF 12/22/22 RF 12/22/22 LF 2/9/23 RF 2/9/23       MCP 84/96 A  75 P ext 92/76 A  75 P ext 36/85 55/91        PIP 72/90 A  50 P ext 71/100 A 36 P ext 29/79 16/80        DIP 0/26 A 0/4 A     Active flexion to [facility]         Treatment:    Therapeutic exercise (89791) x 20 min   PROM by therapist for F/E hand  Fluidotherapy (02892) Therapist directed exercise in Fluidotherapy to left hand/wrist for preparation for tx and desensitization    . Orthotics Management and Training Subsequent Encounter ([id number]) 25 min:  Management and training including assessment and fitting of the upper extremity, initial encounter  Hand based finger extension orthosis adjustments for comfort, new straps fabricated    ASSESSMENT   Patient making very significant gains with finger ROM. She is using the hand more and more functionally, consistently. Improved PROM and tone felt with therapist PROM. Still waiting for Botox treatment. I with orthoses and again, function and ROM improving. PLAN       Hold OT. Orthosis use at night for extension and daily hand use.     GOALS     Short term goals: 1/13/2023  (4 weeks)  Patient and  will be I with HEP and precautions. Pt and  will be I with orthosis jazmin/doff, wear, care, precautions, rationale for use (progressing,  and pt will most likely require continuous education on donning orthosis)   Increase AROM of affected MF,RF MP extension to at least 45 ° to improve ability to open hand to obtain objects. Increase AROM of affected MF, RF PIP flexion to at least 65 ° to improve ability to grasp. Improve Quick DASH functional assessment score from less than 65% deficit. Long term goals: 3/10/2023  (12 weeks)   Pt will be able to use the affected UE for all ADL's without difficulty. Pt will have adequate strength to be able to hold and open containers without difficulty. Pt will be able to make a full composite fist with the involved hand to enable to grasp and hold objects. Pt will have full active composite extension of affected side to be able to open hand to acquire items for ADL's. Pt will lack 25°  or less of MP extension L MF/RF. Improve Quick DASH functional assessment score from less than 20% deficit.       [employer] Portal     OT Protocols

## 2023-02-24 ENCOUNTER — TELEMEDICINE (OUTPATIENT)
Dept: FAMILY MEDICINE CLINIC | Facility: CLINIC | Age: 73
End: 2023-02-24
Payer: MEDICARE

## 2023-02-24 DIAGNOSIS — E03.9 ACQUIRED HYPOTHYROIDISM: Primary | ICD-10-CM

## 2023-02-24 PROCEDURE — 1123F ACP DISCUSS/DSCN MKR DOCD: CPT | Performed by: FAMILY MEDICINE

## 2023-02-24 PROCEDURE — 99212 OFFICE O/P EST SF 10 MIN: CPT | Performed by: FAMILY MEDICINE

## 2023-02-24 RX ORDER — LEVOTHYROXINE SODIUM 0.05 MG/1
50 TABLET ORAL DAILY
Qty: 90 TABLET | Refills: 1 | Status: SHIPPED | OUTPATIENT
Start: 2023-02-24

## 2023-02-24 ASSESSMENT — ENCOUNTER SYMPTOMS
EYES NEGATIVE: 1
RESPIRATORY NEGATIVE: 1
GASTROINTESTINAL NEGATIVE: 1

## 2023-02-24 NOTE — PROGRESS NOTES
2023    TELEHEALTH EVALUATION -- Audio/Visual (During BEN- public health emergency)    HPI:    Poli Glass (:  1950) has requested an audio/video evaluation for the following concern(s):    History of hypothyroidism. TSH on  was low. Has felt clammy and has had difficulty sleeping. Has been taking 88mcg daily. Otherwise stable on current regimen. Past Medical History:   Diagnosis Date    Cancer of kidney ([address]     Dystonic tremor     left arm    GERD (gastroesophageal reflux disease)     PRN meds    H/O complete eye exam     [facility]    Herpes zoster     Hypertension     Hypothyroidism     Left renal mass 11/3/2017    Solitary right kidney          Review of Systems   Constitutional: Negative. Eyes: Negative. Respiratory: Negative. Cardiovascular: Negative. Gastrointestinal: Negative. Genitourinary: Negative. Musculoskeletal: Negative. Neurological:  Positive for tremors (Parkinson's). Negative for dizziness, speech difficulty, light-headedness, numbness and headaches. Psychiatric/Behavioral:  Positive for sleep disturbance. Prior to Visit Medications    Medication Sig Taking? Authorizing Provider   levothyroxine (SYNTHROID) 50 MCG tablet Take 1 tablet by mouth daily Yes Yfn Hickman MD   midodrine (PROAMATINE) 10 MG tablet Take 1 tablet by mouth 3 times daily  Yfn Hickman MD   mirtazapine (REMERON) 7.5 MG tablet TAKE 1 TABLET BY MOUTH EVERYDAY AT BEDTIME  Yfn iHckman MD   Handicap Placard MISC by Does not apply route  Yfn Hickman MD   linaclotide [facility]) 145 MCG capsule Take 1 capsule by mouth every morning (before breakfast)  Yfn Hickman MD   acetaminophen (TYLENOL) 500 MG tablet Take 1,000 mg by mouth every 6 hours as needed for Pain.   Historical Provider, MD   losartan (COZAAR) 50 MG tablet Take 1 tablet by mouth daily  Yfn Hickman MD   carbidopa-levodopa (SINEMET)  MG per tablet TAKE 1 TABLET BY MOUTH THREE TIMES A DAY  Carolina Cortez MD   rosuvastatin (CRESTOR) 20 MG tablet Take 20 mg by mouth daily  Carolina Cortez MD       Social History     Tobacco Use    Smoking status: Never    Smokeless tobacco: Never   Vaping Use    Vaping Use: Never used   Substance Use Topics    Alcohol use: Not Currently    Drug use: Never            PHYSICAL EXAMINATION:  [ INSTRUCTIONS:  \"[x]\" Indicates a positive item  \"[]\" Indicates a negative item  -- DELETE ALL ITEMS NOT EXAMINED]  Vital Signs: (As obtained by patient/caregiver or practitioner observation)    Blood pressure-  Heart rate-    Respiratory rate-    Temperature-  Pulse oximetry-     Constitutional: [x] Appears well-developed and well-nourished [] No apparent distress      [] Abnormal-   Mental status  [x] Alert and awake  [] Oriented to person/place/time []Able to follow commands      Eyes:  EOM    []  Normal  [] Abnormal-  Sclera  []  Normal  [] Abnormal -         Discharge []  None visible  [] Abnormal -    HENT:   [] Normocephalic, atraumatic. [] Abnormal   [] Mouth/Throat: Mucous membranes are moist.     External Ears [] Normal  [] Abnormal-     Neck: [] No visualized mass     Pulmonary/Chest: [x] Respiratory effort normal.  [] No visualized signs of difficulty breathing or respiratory distress        [] Abnormal-      Musculoskeletal:   [] Normal gait with no signs of ataxia         [] Normal range of motion of neck        [] Abnormal-       Neurological:        [x] No Facial Asymmetry (Cranial nerve 7 motor function) (limited exam to video visit)          [] No gaze palsy        [] Abnormal-         Skin:        [] No significant exanthematous lesions or discoloration noted on facial skin         [] Abnormal-            Psychiatric:       [x] Normal Affect [] No Hallucinations        [] Abnormal-     Other pertinent observable physical exam findings-     ASSESSMENT/PLAN:  1.  Acquired hypothyroidism  Decrease thyroid dose. Follow up according to response.  - levothyroxine (SYNTHROID) 50 MCG tablet; Take 1 tablet by mouth daily  Dispense: 90 tablet; Refill: 1      Return in about 6 weeks (around 4/7/2023), or if symptoms worsen or fail to improve. Daniele Dash, was evaluated through a synchronous (real-time) audio-video encounter. The patient (or guardian if applicable) is aware that this is a billable service, which includes applicable co-pays. This Virtual Visit was conducted with patient's (and/or legal guardian's) consent. The visit was conducted pursuant to the emergency declaration under the [facility], [address] [place] [address] authority and the [employer] [facility] General Act. Patient identification was verified, and a caregiver was present when appropriate. The patient was located at Home: [address]  Provider was located at [facility] ([address] [place] [address]): [phone] [address],  [address]        Total time spent on this encounter: Not billed by time    --Michele Johnson MD on 2/24/2023 at 3:19 PM    An electronic signature was used to authenticate this note.

## 2023-03-15 ENCOUNTER — OFFICE VISIT (OUTPATIENT)
Dept: ORTHOPEDIC SURGERY | Age: 73
End: 2023-03-15

## 2023-03-15 DIAGNOSIS — M65.332 TRIGGER FINGER, LEFT MIDDLE FINGER: Primary | ICD-10-CM

## 2023-03-15 DIAGNOSIS — M65.342 TRIGGER FINGER, LEFT RING FINGER: ICD-10-CM

## 2023-03-15 RX ORDER — BETAMETHASONE SODIUM PHOSPHATE AND BETAMETHASONE ACETATE 3; 3 MG/ML; MG/ML
6 INJECTION, SUSPENSION INTRA-ARTICULAR; INTRALESIONAL; INTRAMUSCULAR; SOFT TISSUE ONCE
Status: COMPLETED | OUTPATIENT
Start: 2023-03-15 | End: 2023-03-15

## 2023-03-15 RX ADMIN — BETAMETHASONE SODIUM PHOSPHATE AND BETAMETHASONE ACETATE 6 MG: 3; 3 INJECTION, SUSPENSION INTRA-ARTICULAR; INTRALESIONAL; INTRAMUSCULAR; SOFT TISSUE at 14:57

## 2023-03-17 NOTE — PROGRESS NOTES
Procedure Note    The risk, benefits and alternatives of injection and no injection therapy were discussed. The patient consented for an injection. The patient has been identified by name and birthdate. The injection site was identified, marked and prepped with a alcohol swab. Time out completed. The A1 pulley of the Left middle and ring finger was/were injected with a 25 gauge needle with 1ml of 6mg/ml Betamethasone and 1ml xylocaine plain 1%. The injection site was then dressed with a bandaid. The patient tolerated the injection well. The patient was instructed to monitor their blood sugars if diabetic and call if any concerns. The patient was instructed to call the office if any adverse local effects occurred or any if any questions or concerns arise.     Even though she has contractures from Parkinson disease, she believes the steroid injections provided significant improvement in motion and she would like to have repeat injections today, she is in the process of seeing a neurologist in May for possible Botox injections    Renae Rivera MD, PhD  Orthopaedic Surgery  03/17/23  1:59 PM

## 2023-03-29 RX ORDER — MELOXICAM 15 MG/1
TABLET ORAL
Qty: 42 TABLET | Refills: 0 | OUTPATIENT
Start: 2023-03-29

## 2023-03-30 ENCOUNTER — OFFICE VISIT (OUTPATIENT)
Dept: FAMILY MEDICINE CLINIC | Facility: CLINIC | Age: 73
End: 2023-03-30
Payer: MEDICARE

## 2023-03-30 ENCOUNTER — NURSE ONLY (OUTPATIENT)
Dept: FAMILY MEDICINE CLINIC | Facility: CLINIC | Age: 73
End: 2023-03-30

## 2023-03-30 VITALS
OXYGEN SATURATION: 97 % | TEMPERATURE: 97.7 F | SYSTOLIC BLOOD PRESSURE: 120 MMHG | WEIGHT: 127.4 LBS | HEART RATE: 88 BPM | BODY MASS INDEX: 23.45 KG/M2 | HEIGHT: 62 IN | DIASTOLIC BLOOD PRESSURE: 78 MMHG

## 2023-03-30 DIAGNOSIS — I10 PRIMARY HYPERTENSION: ICD-10-CM

## 2023-03-30 DIAGNOSIS — E03.9 ACQUIRED HYPOTHYROIDISM: ICD-10-CM

## 2023-03-30 DIAGNOSIS — E03.9 ACQUIRED HYPOTHYROIDISM: Primary | ICD-10-CM

## 2023-03-30 PROCEDURE — 99213 OFFICE O/P EST LOW 20 MIN: CPT | Performed by: FAMILY MEDICINE

## 2023-03-30 PROCEDURE — 1123F ACP DISCUSS/DSCN MKR DOCD: CPT | Performed by: FAMILY MEDICINE

## 2023-03-30 PROCEDURE — 3074F SYST BP LT 130 MM HG: CPT | Performed by: FAMILY MEDICINE

## 2023-03-30 PROCEDURE — 3078F DIAST BP <80 MM HG: CPT | Performed by: FAMILY MEDICINE

## 2023-03-30 ASSESSMENT — ENCOUNTER SYMPTOMS
GASTROINTESTINAL NEGATIVE: 1
EYES NEGATIVE: 1
RESPIRATORY NEGATIVE: 1

## 2023-03-30 ASSESSMENT — PATIENT HEALTH QUESTIONNAIRE - PHQ9
2. FEELING DOWN, DEPRESSED OR HOPELESS: 0
SUM OF ALL RESPONSES TO PHQ QUESTIONS 1-9: 0
SUM OF ALL RESPONSES TO PHQ QUESTIONS 1-9: 0
SUM OF ALL RESPONSES TO PHQ9 QUESTIONS 1 & 2: 0
1. LITTLE INTEREST OR PLEASURE IN DOING THINGS: 0
SUM OF ALL RESPONSES TO PHQ QUESTIONS 1-9: 0
SUM OF ALL RESPONSES TO PHQ QUESTIONS 1-9: 0

## 2023-03-30 NOTE — PROGRESS NOTES
HISTORY OF PRESENT ILLNESS    Huang Desouza is a 74 y.o. female. HPI  Chief Complaint   Patient presents with    Follow-up     See above. Last TSH was low. Thyroid dose was decreased. Tolerating current dose with no side effects. No side effects from BP medication. No headache or vision change. Denies chest pain or SOB. No swelling. Followed by neuro for Parkinson's; stable. IBS symptoms stable on Linzess. Current Outpatient Medications on File Prior to Visit   Medication Sig Dispense Refill    cyanocobalamin 2000 MCG tablet Take 2,000 mcg by mouth daily      midodrine (PROAMATINE) 10 MG tablet Take 1 tablet by mouth 3 times daily 90 tablet 3    Handicap Placard MISC by Does not apply route 1 each 0    linaclotide (LINZESS) 145 MCG capsule Take 1 capsule by mouth every morning (before breakfast) 30 capsule 3    acetaminophen (TYLENOL) 500 MG tablet Take 1,000 mg by mouth every 6 hours as needed for Pain.      losartan (COZAAR) 50 MG tablet Take 1 tablet by mouth daily 90 tablet 3    carbidopa-levodopa (SINEMET)  MG per tablet TAKE 1 TABLET BY MOUTH THREE TIMES A  tablet 3    rosuvastatin (CRESTOR) 20 MG tablet Take 20 mg by mouth daily 90 tablet 3    mirtazapine (REMERON) 7.5 MG tablet TAKE 1 TABLET BY MOUTH EVERYDAY AT BEDTIME (Patient not taking: Reported on 3/30/2023) 30 tablet 3     No current facility-administered medications on file prior to visit. Past Medical History:   Diagnosis Date    Cancer of kidney ([address]     Dystonic tremor     left arm    GERD (gastroesophageal reflux disease)     PRN meds    H/O complete eye exam     [facility]    Herpes zoster     Hypertension     Hypothyroidism     Left renal mass 11/3/2017    Solitary right kidney        Review of Systems   Constitutional: Negative. HENT: Negative. Eyes: Negative. Respiratory: Negative. Cardiovascular: Negative. Gastrointestinal: Negative. Genitourinary: Negative.     Musculoskeletal:

## 2023-03-31 LAB — TSH, 3RD GENERATION: 27.4 UIU/ML (ref 0.36–3.74)

## 2023-03-31 RX ORDER — LEVOTHYROXINE SODIUM 0.07 MG/1
75 TABLET ORAL DAILY
Qty: 30 TABLET | Refills: 5 | Status: SHIPPED | OUTPATIENT
Start: 2023-03-31

## 2023-04-24 NOTE — TELEPHONE ENCOUNTER
Please send new presciption of linzess with corrected date of birth to [facility] - fax [phone]

## 2023-04-24 NOTE — TELEPHONE ENCOUNTER
Patient's  called back stating that patient only has 3 pills left. Could you also [facility]. (Patient's  said he will just go ahead and pay the $100 now because she needs it.  But also send a prescription to [facility] assist for discounted rate for later)

## 2023-04-25 DIAGNOSIS — I10 PRIMARY HYPERTENSION: Primary | ICD-10-CM

## 2023-04-26 ENCOUNTER — NURSE ONLY (OUTPATIENT)
Dept: FAMILY MEDICINE CLINIC | Facility: CLINIC | Age: 73
End: 2023-04-26

## 2023-04-26 DIAGNOSIS — I10 PRIMARY HYPERTENSION: ICD-10-CM

## 2023-04-27 LAB — TSH, 3RD GENERATION: 1.83 UIU/ML (ref 0.36–3.74)

## 2023-04-27 NOTE — TELEPHONE ENCOUNTER
----- Message from Jen Lr sent at 4/26/2023  2:04 PM EDT -----  Regarding: Pt Assistance error  Hey, This patient is in right now for labs and said that [facility] Patient assistance says that they still don't have the updated rx. It looks like the fax number on the RX and the one they gave are different. Would you be able to print the rx and I can manually fax it?

## 2023-06-05 RX ORDER — MELOXICAM 15 MG/1
TABLET ORAL
Qty: 42 TABLET | Refills: 0 | OUTPATIENT
Start: 2023-06-05

## 2023-06-27 DIAGNOSIS — E03.9 HYPOTHYROIDISM, UNSPECIFIED: ICD-10-CM

## 2023-06-27 RX ORDER — LEVOTHYROXINE SODIUM 0.1 MG/1
TABLET ORAL
Qty: 90 TABLET | Refills: 3 | OUTPATIENT
Start: 2023-06-27

## 2023-07-14 DIAGNOSIS — E03.9 ACQUIRED HYPOTHYROIDISM: ICD-10-CM

## 2023-07-14 RX ORDER — LEVOTHYROXINE SODIUM 0.1 MG/1
TABLET ORAL
Qty: 90 TABLET | Refills: 3 | OUTPATIENT
Start: 2023-07-14

## 2023-07-14 RX ORDER — LEVOTHYROXINE SODIUM 0.07 MG/1
TABLET ORAL
Qty: 90 TABLET | Refills: 1 | OUTPATIENT
Start: 2023-07-14

## 2023-07-17 RX ORDER — MELOXICAM 15 MG/1
TABLET ORAL
Qty: 42 TABLET | Refills: 0 | OUTPATIENT
Start: 2023-07-17

## 2023-08-17 DIAGNOSIS — F41.9 ANXIETY: Primary | ICD-10-CM

## 2023-08-18 RX ORDER — OXAZEPAM 15 MG/1
15 CAPSULE ORAL EVERY 8 HOURS PRN
Qty: 60 CAPSULE | Refills: 3 | Status: SHIPPED | OUTPATIENT
Start: 2023-08-18 | End: 2024-04-14

## 2023-09-06 ENCOUNTER — OFFICE VISIT (OUTPATIENT)
Dept: FAMILY MEDICINE CLINIC | Facility: CLINIC | Age: 73
End: 2023-09-06
Payer: MEDICARE

## 2023-09-06 VITALS
OXYGEN SATURATION: 98 % | DIASTOLIC BLOOD PRESSURE: 74 MMHG | SYSTOLIC BLOOD PRESSURE: 112 MMHG | HEIGHT: 62 IN | TEMPERATURE: 97.2 F | WEIGHT: 120.2 LBS | HEART RATE: 83 BPM | BODY MASS INDEX: 22.12 KG/M2

## 2023-09-06 DIAGNOSIS — L08.9 SKIN INFECTION: Primary | ICD-10-CM

## 2023-09-06 DIAGNOSIS — L82.1 SEBORRHEIC KERATOSIS: ICD-10-CM

## 2023-09-06 DIAGNOSIS — F33.0 MAJOR DEPRESSIVE DISORDER, RECURRENT, MILD (HCC): ICD-10-CM

## 2023-09-06 DIAGNOSIS — Z12.31 SCREENING MAMMOGRAM, ENCOUNTER FOR: ICD-10-CM

## 2023-09-06 PROCEDURE — 3078F DIAST BP <80 MM HG: CPT | Performed by: FAMILY MEDICINE

## 2023-09-06 PROCEDURE — 3074F SYST BP LT 130 MM HG: CPT | Performed by: FAMILY MEDICINE

## 2023-09-06 PROCEDURE — 1123F ACP DISCUSS/DSCN MKR DOCD: CPT | Performed by: FAMILY MEDICINE

## 2023-09-06 PROCEDURE — 99213 OFFICE O/P EST LOW 20 MIN: CPT | Performed by: FAMILY MEDICINE

## 2023-09-06 RX ORDER — AMANTADINE HYDROCHLORIDE 100 MG/1
CAPSULE, GELATIN COATED ORAL
COMMUNITY
Start: 2023-08-16

## 2023-09-06 RX ORDER — SULFAMETHOXAZOLE AND TRIMETHOPRIM 800; 160 MG/1; MG/1
1 TABLET ORAL 2 TIMES DAILY
Qty: 20 TABLET | Refills: 0 | Status: SHIPPED | OUTPATIENT
Start: 2023-09-06 | End: 2023-09-16

## 2023-09-06 ASSESSMENT — ENCOUNTER SYMPTOMS
EYE DISCHARGE: 0
DIARRHEA: 0
CONSTIPATION: 0
SHORTNESS OF BREATH: 0
CHEST TIGHTNESS: 0
COUGH: 0
SINUS PAIN: 0
ABDOMINAL PAIN: 0

## 2023-09-06 NOTE — PROGRESS NOTES
Radha Farias is a 74 y.o. female who presents with   Chief Complaint   Patient presents with    Skin Problem     Raised area on upper left chest, x2 weeks, painful. Inflammation/redness on outer edges, white inside with darkened center. Denies drainage. History of Present Illness  Pt with SK on L upper chest.  Became inflamed and tender 2 weeks ago. No drainage. Started when she had several mosquito bites. No drainage. Parkinson's fairly stable. Review of Systems  Review of Systems   Constitutional:  Negative for appetite change, fatigue and fever. HENT:  Negative for congestion, ear pain and sinus pain. Eyes:  Negative for discharge. Respiratory:  Negative for cough, chest tightness and shortness of breath. Cardiovascular:  Negative for chest pain, palpitations and leg swelling. Gastrointestinal:  Negative for abdominal pain, constipation and diarrhea. Genitourinary:  Negative for dysuria. Musculoskeletal:  Negative for joint swelling. Skin:  Negative for rash. Neurological:  Negative for headaches. Hematological:  Negative for adenopathy. Psychiatric/Behavioral:  Negative for dysphoric mood. The patient is not nervous/anxious. Medications  Current Outpatient Medications   Medication Sig Dispense Refill    sulfamethoxazole-trimethoprim (BACTRIM DS;SEPTRA DS) 800-160 MG per tablet Take 1 tablet by mouth 2 times daily for 10 days 20 tablet 0    oxazepam (SERAX) 15 MG capsule Take 1 capsule by mouth every 8 hours as needed for Anxiety for up to 240 days.  Max Daily Amount: 45 mg 60 capsule 3    omeprazole (PRILOSEC) 40 MG delayed release capsule Take by mouth daily      ondansetron (ZOFRAN) 4 MG tablet TAKE 1 TABLET (4 MG) BY MOUTH 3 (THREE) TIMES A DAY AS NEEDED FOR NAUSEA OR VOMITING      sertraline (ZOLOFT) 50 MG tablet 1/2 po qhs for 7 doses then 1 po qhs 90 tablet 1    linaclotide (LINZESS) 145 MCG capsule Take 1 capsule by mouth every morning (before breakfast)

## 2023-09-14 DIAGNOSIS — F32.A MILD DEPRESSION: ICD-10-CM

## 2023-09-14 DIAGNOSIS — F41.9 ANXIETY: ICD-10-CM

## 2023-09-15 ENCOUNTER — TELEPHONE (OUTPATIENT)
Dept: FAMILY MEDICINE CLINIC | Facility: CLINIC | Age: 73
End: 2023-09-15

## 2023-09-18 DIAGNOSIS — L98.9 SKIN LESION OF CHEST WALL: Primary | ICD-10-CM

## 2023-09-19 NOTE — TELEPHONE ENCOUNTER
Pt was referred to and has scheduled OV with derm for 9/25/23. Advised pt to keep appt as scheduled and call with any further questions or concerns. Pt voiced understanding.

## 2023-09-21 DIAGNOSIS — E03.9 ACQUIRED HYPOTHYROIDISM: ICD-10-CM

## 2023-09-21 RX ORDER — LEVOTHYROXINE SODIUM 0.07 MG/1
75 TABLET ORAL DAILY
Qty: 90 TABLET | Refills: 3 | Status: SHIPPED | OUTPATIENT
Start: 2023-09-21

## 2023-10-03 ENCOUNTER — OFFICE VISIT (OUTPATIENT)
Dept: FAMILY MEDICINE CLINIC | Facility: CLINIC | Age: 73
End: 2023-10-03
Payer: MEDICARE

## 2023-10-03 VITALS
OXYGEN SATURATION: 97 % | BODY MASS INDEX: 22.13 KG/M2 | TEMPERATURE: 97.1 F | WEIGHT: 121 LBS | DIASTOLIC BLOOD PRESSURE: 68 MMHG | HEART RATE: 106 BPM | SYSTOLIC BLOOD PRESSURE: 110 MMHG

## 2023-10-03 DIAGNOSIS — F32.A ANXIETY AND DEPRESSION: ICD-10-CM

## 2023-10-03 DIAGNOSIS — K21.9 GASTROESOPHAGEAL REFLUX DISEASE WITHOUT ESOPHAGITIS: ICD-10-CM

## 2023-10-03 DIAGNOSIS — G20.A2 PARKINSON'S DISEASE WITH FLUCTUATING MANIFESTATIONS, UNSPECIFIED WHETHER DYSKINESIA PRESENT: ICD-10-CM

## 2023-10-03 DIAGNOSIS — E78.5 HYPERLIPIDEMIA, UNSPECIFIED HYPERLIPIDEMIA TYPE: ICD-10-CM

## 2023-10-03 DIAGNOSIS — Z00.00 ENCOUNTER FOR SUBSEQUENT ANNUAL WELLNESS VISIT (AWV) IN MEDICARE PATIENT: Primary | ICD-10-CM

## 2023-10-03 DIAGNOSIS — F41.9 ANXIETY AND DEPRESSION: ICD-10-CM

## 2023-10-03 DIAGNOSIS — I10 PRIMARY HYPERTENSION: ICD-10-CM

## 2023-10-03 DIAGNOSIS — E03.9 ACQUIRED HYPOTHYROIDISM: ICD-10-CM

## 2023-10-03 DIAGNOSIS — N18.4 STAGE 4 CHRONIC KIDNEY DISEASE (HCC): ICD-10-CM

## 2023-10-03 LAB
ALBUMIN SERPL-MCNC: 3.9 G/DL (ref 3.2–4.6)
ALBUMIN/GLOB SERPL: 1.3 (ref 0.4–1.6)
ALP SERPL-CCNC: 49 U/L (ref 50–136)
ALT SERPL-CCNC: 12 U/L (ref 12–65)
ANION GAP SERPL CALC-SCNC: 8 MMOL/L (ref 2–11)
APPEARANCE UR: CLEAR
AST SERPL-CCNC: 18 U/L (ref 15–37)
BASOPHILS # BLD: 0.1 K/UL (ref 0–0.2)
BASOPHILS NFR BLD: 1 % (ref 0–2)
BILIRUB SERPL-MCNC: 0.4 MG/DL (ref 0.2–1.1)
BILIRUB UR QL: NEGATIVE
BUN SERPL-MCNC: 32 MG/DL (ref 8–23)
CALCIUM SERPL-MCNC: 9.1 MG/DL (ref 8.3–10.4)
CHLORIDE SERPL-SCNC: 111 MMOL/L (ref 101–110)
CHOLEST SERPL-MCNC: 138 MG/DL
CO2 SERPL-SCNC: 24 MMOL/L (ref 21–32)
COLOR UR: NORMAL
CREAT SERPL-MCNC: 1.9 MG/DL (ref 0.6–1)
DIFFERENTIAL METHOD BLD: ABNORMAL
EOSINOPHIL # BLD: 0.1 K/UL (ref 0–0.8)
EOSINOPHIL NFR BLD: 1 % (ref 0.5–7.8)
ERYTHROCYTE [DISTWIDTH] IN BLOOD BY AUTOMATED COUNT: 13.8 % (ref 11.9–14.6)
GLOBULIN SER CALC-MCNC: 3.1 G/DL (ref 2.8–4.5)
GLUCOSE SERPL-MCNC: 102 MG/DL (ref 65–100)
GLUCOSE UR STRIP.AUTO-MCNC: NEGATIVE MG/DL
HCT VFR BLD AUTO: 38.4 % (ref 35.8–46.3)
HDLC SERPL-MCNC: 42 MG/DL (ref 40–60)
HDLC SERPL: 3.3
HGB BLD-MCNC: 12.4 G/DL (ref 11.7–15.4)
HGB UR QL STRIP: NEGATIVE
IMM GRANULOCYTES # BLD AUTO: 0 K/UL (ref 0–0.5)
IMM GRANULOCYTES NFR BLD AUTO: 0 % (ref 0–5)
KETONES UR QL STRIP.AUTO: NEGATIVE MG/DL
LDLC SERPL CALC-MCNC: 62.4 MG/DL
LEUKOCYTE ESTERASE UR QL STRIP.AUTO: NEGATIVE
LYMPHOCYTES # BLD: 1.6 K/UL (ref 0.5–4.6)
LYMPHOCYTES NFR BLD: 17 % (ref 13–44)
MCH RBC QN AUTO: 31.7 PG (ref 26.1–32.9)
MCHC RBC AUTO-ENTMCNC: 32.3 G/DL (ref 31.4–35)
MCV RBC AUTO: 98.2 FL (ref 82–102)
MONOCYTES # BLD: 0.6 K/UL (ref 0.1–1.3)
MONOCYTES NFR BLD: 6 % (ref 4–12)
NEUTS SEG # BLD: 6.8 K/UL (ref 1.7–8.2)
NEUTS SEG NFR BLD: 75 % (ref 43–78)
NITRITE UR QL STRIP.AUTO: NEGATIVE
NRBC # BLD: 0 K/UL (ref 0–0.2)
PH UR STRIP: 6 (ref 5–9)
PLATELET # BLD AUTO: 277 K/UL (ref 150–450)
PMV BLD AUTO: 9.7 FL (ref 9.4–12.3)
POTASSIUM SERPL-SCNC: 4.6 MMOL/L (ref 3.5–5.1)
PROT SERPL-MCNC: 7 G/DL (ref 6.3–8.2)
PROT UR STRIP-MCNC: NEGATIVE MG/DL
RBC # BLD AUTO: 3.91 M/UL (ref 4.05–5.2)
SODIUM SERPL-SCNC: 143 MMOL/L (ref 133–143)
SP GR UR REFRACTOMETRY: 1.01 (ref 1–1.02)
TRIGL SERPL-MCNC: 168 MG/DL (ref 35–150)
TSH, 3RD GENERATION: 3.83 UIU/ML (ref 0.36–3.74)
UROBILINOGEN UR QL STRIP.AUTO: 0.2 EU/DL (ref 0.2–1)
VLDLC SERPL CALC-MCNC: 33.6 MG/DL (ref 6–23)
WBC # BLD AUTO: 9.2 K/UL (ref 4.3–11.1)

## 2023-10-03 PROCEDURE — 3078F DIAST BP <80 MM HG: CPT | Performed by: FAMILY MEDICINE

## 2023-10-03 PROCEDURE — 99214 OFFICE O/P EST MOD 30 MIN: CPT | Performed by: FAMILY MEDICINE

## 2023-10-03 PROCEDURE — 3074F SYST BP LT 130 MM HG: CPT | Performed by: FAMILY MEDICINE

## 2023-10-03 PROCEDURE — 1123F ACP DISCUSS/DSCN MKR DOCD: CPT | Performed by: FAMILY MEDICINE

## 2023-10-03 PROCEDURE — G0439 PPPS, SUBSEQ VISIT: HCPCS | Performed by: FAMILY MEDICINE

## 2023-10-03 RX ORDER — PLECANATIDE 3 MG/1
1 TABLET ORAL DAILY
COMMUNITY
Start: 2023-09-28

## 2023-10-03 RX ORDER — CARBIDOPA AND LEVODOPA 50; 200 MG/1; MG/1
TABLET, EXTENDED RELEASE ORAL
COMMUNITY
Start: 2023-09-04

## 2023-10-03 RX ORDER — AMANTADINE 68.5 MG/1
CAPSULE, COATED PELLETS ORAL
COMMUNITY
Start: 2023-09-20

## 2023-10-03 ASSESSMENT — ENCOUNTER SYMPTOMS
RESPIRATORY NEGATIVE: 1
EYES NEGATIVE: 1
GASTROINTESTINAL NEGATIVE: 1

## 2023-10-03 ASSESSMENT — PATIENT HEALTH QUESTIONNAIRE - PHQ9
SUM OF ALL RESPONSES TO PHQ9 QUESTIONS 1 & 2: 0
2. FEELING DOWN, DEPRESSED OR HOPELESS: 0
1. LITTLE INTEREST OR PLEASURE IN DOING THINGS: 0
SUM OF ALL RESPONSES TO PHQ QUESTIONS 1-9: 0

## 2023-10-03 ASSESSMENT — LIFESTYLE VARIABLES
HOW MANY STANDARD DRINKS CONTAINING ALCOHOL DO YOU HAVE ON A TYPICAL DAY: PATIENT DOES NOT DRINK
HOW OFTEN DO YOU HAVE A DRINK CONTAINING ALCOHOL: NEVER

## 2023-10-10 DIAGNOSIS — E78.2 MIXED HYPERLIPIDEMIA: ICD-10-CM

## 2023-10-10 RX ORDER — ROSUVASTATIN CALCIUM 20 MG/1
TABLET, COATED ORAL
Qty: 90 TABLET | Refills: 3 | Status: SHIPPED | OUTPATIENT
Start: 2023-10-10

## 2023-10-10 NOTE — TELEPHONE ENCOUNTER
Refill:  Rosuvastatin  Dosage: 20 mg  Freq: qd  [facility]

## 2023-10-25 ENCOUNTER — CLINICAL DOCUMENTATION (OUTPATIENT)
Age: 73
End: 2023-10-25

## 2023-10-25 NOTE — PROGRESS NOTES
[facility] Susana Zamora  [address] [place] [id number]  Dept: [phone]      Occupational Therapy Discharge/Discontinuation Note     Referring MD: No ref. provider found    Patient has been discharged from therapy based on Expiration of plan of care without further referral by ordering physician. Patient was  last seen for OT services on 2/23/2023. At that time, the patient appeared to be progressing well with therapy treatment. Therapy goals were partially met. Please see previous notes for objective findings.

## 2023-11-28 DIAGNOSIS — Z12.31 SCREENING MAMMOGRAM, ENCOUNTER FOR: ICD-10-CM

## 2023-12-04 DIAGNOSIS — F41.9 ANXIETY: ICD-10-CM

## 2023-12-04 DIAGNOSIS — F32.A MILD DEPRESSION: ICD-10-CM

## 2023-12-04 NOTE — TELEPHONE ENCOUNTER
Refill: Sertraline   Dosage: 50 MG  Freq: 1/2 po qhs for 7 doses then 1 po qhs  To: [address]

## 2023-12-11 RX ORDER — LOSARTAN POTASSIUM 50 MG/1
50 TABLET ORAL DAILY
Qty: 90 TABLET | Refills: 3 | OUTPATIENT
Start: 2023-12-11

## 2023-12-12 DIAGNOSIS — F41.9 ANXIETY: ICD-10-CM

## 2023-12-12 RX ORDER — OXAZEPAM 15 MG/1
CAPSULE ORAL
Qty: 60 CAPSULE | OUTPATIENT
Start: 2023-12-12

## 2023-12-12 RX ORDER — OXAZEPAM 15 MG/1
15 CAPSULE ORAL EVERY 8 HOURS PRN
Qty: 60 CAPSULE | Refills: 3 | Status: SHIPPED | OUTPATIENT
Start: 2023-12-12 | End: 2024-08-08

## 2023-12-20 DIAGNOSIS — E03.9 ACQUIRED HYPOTHYROIDISM: ICD-10-CM

## 2023-12-20 DIAGNOSIS — R79.89 ABNORMAL TSH: ICD-10-CM

## 2023-12-20 LAB — TSH, 3RD GENERATION: 1.93 UIU/ML (ref 0.36–3.74)

## 2023-12-29 ENCOUNTER — TELEPHONE (OUTPATIENT)
Dept: FAMILY MEDICINE CLINIC | Facility: CLINIC | Age: 73
End: 2023-12-29

## 2023-12-30 RX ORDER — MIDODRINE HYDROCHLORIDE 10 MG/1
10 TABLET ORAL 3 TIMES DAILY
Qty: 90 TABLET | Refills: 3 | Status: SHIPPED | OUTPATIENT
Start: 2023-12-30

## 2024-01-04 ENCOUNTER — OFFICE VISIT (OUTPATIENT)
Dept: FAMILY MEDICINE CLINIC | Facility: CLINIC | Age: 74
End: 2024-01-04
Payer: MEDICARE

## 2024-01-04 VITALS
TEMPERATURE: 97.9 F | WEIGHT: 117 LBS | SYSTOLIC BLOOD PRESSURE: 120 MMHG | OXYGEN SATURATION: 99 % | HEART RATE: 87 BPM | DIASTOLIC BLOOD PRESSURE: 72 MMHG | BODY MASS INDEX: 21.4 KG/M2

## 2024-01-04 DIAGNOSIS — M77.02 EPICONDYLITIS ELBOW, MEDIAL, LEFT: ICD-10-CM

## 2024-01-04 DIAGNOSIS — B34.9 VIRAL SYNDROME: ICD-10-CM

## 2024-01-04 DIAGNOSIS — K12.0 APHTHOUS ULCER: ICD-10-CM

## 2024-01-04 DIAGNOSIS — E03.9 ACQUIRED HYPOTHYROIDISM: Primary | ICD-10-CM

## 2024-01-04 PROBLEM — G20.A1 PARKINSON'S DISEASE: Status: ACTIVE | Noted: 2024-01-04

## 2024-01-04 PROCEDURE — 3078F DIAST BP <80 MM HG: CPT | Performed by: FAMILY MEDICINE

## 2024-01-04 PROCEDURE — 99213 OFFICE O/P EST LOW 20 MIN: CPT | Performed by: FAMILY MEDICINE

## 2024-01-04 PROCEDURE — 3074F SYST BP LT 130 MM HG: CPT | Performed by: FAMILY MEDICINE

## 2024-01-04 PROCEDURE — 96372 THER/PROPH/DIAG INJ SC/IM: CPT | Performed by: FAMILY MEDICINE

## 2024-01-04 PROCEDURE — 1123F ACP DISCUSS/DSCN MKR DOCD: CPT | Performed by: FAMILY MEDICINE

## 2024-01-04 RX ORDER — TRIAMCINOLONE ACETONIDE 0.1 %
PASTE (GRAM) DENTAL
Qty: 5 G | Refills: 1 | Status: SHIPPED | OUTPATIENT
Start: 2024-01-04

## 2024-01-04 RX ORDER — METHYLPREDNISOLONE ACETATE 80 MG/ML
40 INJECTION, SUSPENSION INTRA-ARTICULAR; INTRALESIONAL; INTRAMUSCULAR; SOFT TISSUE ONCE
Status: COMPLETED | OUTPATIENT
Start: 2024-01-04 | End: 2024-01-04

## 2024-01-04 RX ADMIN — METHYLPREDNISOLONE ACETATE 40 MG: 80 INJECTION, SUSPENSION INTRA-ARTICULAR; INTRALESIONAL; INTRAMUSCULAR; SOFT TISSUE at 13:46

## 2024-01-04 ASSESSMENT — PATIENT HEALTH QUESTIONNAIRE - PHQ9
4. FEELING TIRED OR HAVING LITTLE ENERGY: 0
8. MOVING OR SPEAKING SO SLOWLY THAT OTHER PEOPLE COULD HAVE NOTICED. OR THE OPPOSITE, BEING SO FIGETY OR RESTLESS THAT YOU HAVE BEEN MOVING AROUND A LOT MORE THAN USUAL: 0
3. TROUBLE FALLING OR STAYING ASLEEP: 0
SUM OF ALL RESPONSES TO PHQ QUESTIONS 1-9: 0
SUM OF ALL RESPONSES TO PHQ9 QUESTIONS 1 & 2: 0
10. IF YOU CHECKED OFF ANY PROBLEMS, HOW DIFFICULT HAVE THESE PROBLEMS MADE IT FOR YOU TO DO YOUR WORK, TAKE CARE OF THINGS AT HOME, OR GET ALONG WITH OTHER PEOPLE: 0
9. THOUGHTS THAT YOU WOULD BE BETTER OFF DEAD, OR OF HURTING YOURSELF: 0
SUM OF ALL RESPONSES TO PHQ QUESTIONS 1-9: 0
5. POOR APPETITE OR OVEREATING: 0
6. FEELING BAD ABOUT YOURSELF - OR THAT YOU ARE A FAILURE OR HAVE LET YOURSELF OR YOUR FAMILY DOWN: 0
2. FEELING DOWN, DEPRESSED OR HOPELESS: 0
SUM OF ALL RESPONSES TO PHQ QUESTIONS 1-9: 0
SUM OF ALL RESPONSES TO PHQ QUESTIONS 1-9: 0
1. LITTLE INTEREST OR PLEASURE IN DOING THINGS: 0
7. TROUBLE CONCENTRATING ON THINGS, SUCH AS READING THE NEWSPAPER OR WATCHING TELEVISION: 0

## 2024-01-04 ASSESSMENT — ENCOUNTER SYMPTOMS
SINUS PAIN: 0
GASTROINTESTINAL NEGATIVE: 1
SORE THROAT: 0
RHINORRHEA: 1
SINUS PRESSURE: 0
EYES NEGATIVE: 1
COUGH: 1
TROUBLE SWALLOWING: 0

## 2024-01-04 NOTE — PROGRESS NOTES
HISTORY OF PRESENT ILLNESS    Janeth Tolbert is a 73 y.o. female.  HPI  Chief Complaint   Patient presents with    mouth blisters    Cough    Nasal Congestion    Joint Swelling     Left elbow      See above. Onset 1-2 weeks ago of mouth ulcers with mild nasal congestion and dry cough. No fever or sore throat. No difficulty swallowing.  Four week onset of pain in inner left elbow. No pain at rest. Hurts to lift with arm extended.      Current Outpatient Medications on File Prior to Visit   Medication Sig Dispense Refill    midodrine (PROAMATINE) 10 MG tablet Take 1 tablet by mouth 3 times daily 90 tablet 3    amantadine (SYMMETREL) 100 MG capsule TAKE 1 CAP 2 (TWO) TIMES A DAY INDICATIONS: A TYPE OF MOVEMENT DISORDER CALLED PARKINSONISM      oxazepam (SERAX) 15 MG capsule Take 1 capsule by mouth every 8 hours as needed for Anxiety for up to 240 days. Max Daily Amount: 45 mg 60 capsule 3    sertraline (ZOLOFT) 50 MG tablet 1/2 po qhs for 7 doses then 1 po qhs 90 tablet 1    rosuvastatin (CRESTOR) 20 MG tablet Take 20 mg by mouth daily 90 tablet 3    TRULANCE 3 MG TABS Take 1 tablet by mouth daily      carbidopa-levodopa (SINEMET CR)  MG per extended release tablet TAKE 1 TABLET BY MOUTH AT BEDTIME AND 1 TABLET AT 3AM      levothyroxine (SYNTHROID) 75 MCG tablet Take 1 tablet by mouth daily 90 tablet 3    omeprazole (PRILOSEC) 40 MG delayed release capsule Take by mouth daily      Handicap Placard MISC by Does not apply route 1 each 0    acetaminophen (TYLENOL) 500 MG tablet Take 2 tablets by mouth every 6 hours as needed for Pain      carbidopa-levodopa (SINEMET)  MG per tablet TAKE 1 TABLET BY MOUTH THREE TIMES A  tablet 3     No current facility-administered medications on file prior to visit.     Past Medical History:   Diagnosis Date    Cancer of kidney (HCC)     Chronic kidney disease 2015    Depression 2021    Feel like eric slways sick    Dystonic tremor     left arm    GERD

## 2024-01-05 LAB — TSH, 3RD GENERATION: 1.53 UIU/ML (ref 0.36–3.74)

## 2024-01-18 ENCOUNTER — INITIAL CONSULT (OUTPATIENT)
Age: 74
End: 2024-01-18

## 2024-01-18 VITALS
WEIGHT: 115.6 LBS | HEIGHT: 62 IN | SYSTOLIC BLOOD PRESSURE: 115 MMHG | HEART RATE: 85 BPM | DIASTOLIC BLOOD PRESSURE: 70 MMHG | BODY MASS INDEX: 21.27 KG/M2

## 2024-01-18 DIAGNOSIS — I44.7 LBBB (LEFT BUNDLE BRANCH BLOCK): ICD-10-CM

## 2024-01-18 DIAGNOSIS — R55 SYNCOPE, UNSPECIFIED SYNCOPE TYPE: ICD-10-CM

## 2024-01-18 DIAGNOSIS — I95.1 ORTHOSTATIC HYPOTENSION: Chronic | ICD-10-CM

## 2024-01-18 DIAGNOSIS — Z76.89 ENCOUNTER TO ESTABLISH CARE: Primary | ICD-10-CM

## 2024-01-18 ASSESSMENT — ENCOUNTER SYMPTOMS
SHORTNESS OF BREATH: 0
SORE THROAT: 0
NAUSEA: 0
DIARRHEA: 0
VOMITING: 0
COUGH: 1

## 2024-01-18 NOTE — PROGRESS NOTES
[address] [facility], [place] [address]  PHONE: [phone]    SUBJECTIVE:   Janeth Tolbert is a 73 y.o. female 1950   seen for a consultation visit regarding the following:     Chief Complaint   Patient presents with    Consultation     Fatigue, unspecified type  Hypotension, unspecified hypotension type                  Consultation is requested for evaluation of Consultation (Fatigue, unspecified type/Hypotension, unspecified hypotension type//)   .    History of present illness: 73 y.o. female with PMH HTN, hypothyroidism, CKD4, Parkinson's disease presenting for cardiac evaluation. Patient has orthostatic hypotension from her Parkinson's and is on midodrine. She has recently had more episodes of her blood pressure dropping than normal and required an ED visit on 1/16. She denies chest pain, dyspnea or palpitations.      Past Medical History, Past Surgical History, Family history, Social History, and Medications were all reviewed with the patient today and updated as necessary.       No Known Allergies  Past Medical History:   Diagnosis Date    Cancer of kidney (HCC)     Chronic kidney disease 2015    Depression 2021    Feel like eric slways sick    Dystonic tremor     left arm    GERD (gastroesophageal reflux disease)     PRN meds    H/O complete eye exam     Dr. Tolbert[facility]    Herpes zoster     Hypertension     Hypothyroidism     Irritable bowel syndrome     Not sure    Left renal mass 11/3/2017    Neuropathy     Not sure    Solitary right kidney      Past Surgical History:   Procedure Laterality Date    COLONOSCOPY  2010    with EGD    CT BIOPSY RENAL  09/27/2017    CT BIOPSY RENAL 9/27/2017 [facility]Y CT SCAN    KIDNEY REMOVAL Left 11/03/2017    renal cell cancer    NEPHRECTOMY Left 11/03/2017    PETER AND BSO (CERVIX REMOVED)      UPPER GASTROINTESTINAL ENDOSCOPY  2023     Family History   Problem Relation Age of Onset    Elevated Lipids Father     Heart Disease Father

## 2024-02-14 ENCOUNTER — TELEPHONE (OUTPATIENT)
Age: 74
End: 2024-02-14

## 2024-03-26 ENCOUNTER — TELEPHONE (OUTPATIENT)
Dept: FAMILY MEDICINE CLINIC | Facility: CLINIC | Age: 74
End: 2024-03-26

## 2024-03-26 RX ORDER — PLECANATIDE 3 MG/1
1 TABLET ORAL DAILY
Qty: 90 TABLET | Refills: 3 | Status: SHIPPED | OUTPATIENT
Start: 2024-03-26

## 2024-03-26 NOTE — TELEPHONE ENCOUNTER
Pt's  is calling about a letter that is supposed to be sent in for pt's Trulance 3 MG. Pt didn't know what it was. All pt said was a letter wasn't submitted for it.     Any questions call [phone]

## 2024-03-26 NOTE — TELEPHONE ENCOUNTER
Have not received any letters about this but it looks like she needs a refill. Sending in to pharmacy.

## 2024-04-05 ENCOUNTER — TELEPHONE (OUTPATIENT)
Dept: FAMILY MEDICINE CLINIC | Facility: CLINIC | Age: 74
End: 2024-04-05

## 2024-04-05 NOTE — TELEPHONE ENCOUNTER
I faxed over all the pages that we received. I tried calling back but it is an answering system and waited to be answered.

## 2024-04-05 NOTE — TELEPHONE ENCOUNTER
Some is calling from [employer] assisting program? About the pt's Trulance 3 MG tabs, she said she was finishing up paper work for it and noticed page 6 is missing?     Call Connie at [phone] with any questions.

## 2024-04-08 DIAGNOSIS — F41.9 ANXIETY: ICD-10-CM

## 2024-04-08 RX ORDER — OXAZEPAM 15 MG/1
15 CAPSULE ORAL EVERY 8 HOURS PRN
Qty: 60 CAPSULE | Refills: 3 | Status: SHIPPED | OUTPATIENT
Start: 2024-04-08 | End: 2024-12-04

## 2024-04-11 DIAGNOSIS — F41.9 ANXIETY: ICD-10-CM

## 2024-04-11 DIAGNOSIS — F32.A MILD DEPRESSION: ICD-10-CM

## 2024-04-11 RX ORDER — LOSARTAN POTASSIUM 50 MG/1
50 TABLET ORAL DAILY
Qty: 90 TABLET | Refills: 3 | OUTPATIENT
Start: 2024-04-11

## 2024-06-19 NOTE — PROGRESS NOTES
Admit Date: 11/3/2017    Subjective:     Complains of sore throat from ET tube. Abdomen still sore with no flatus as of yet. Objective:     Patient Vitals for the past 8 hrs:   BP Temp Pulse Resp SpO2   11/05/17 0756 133/70 98.9 °F (37.2 °C) 69 16 94 %        11/03 1901 - 11/05 0700  In: 1500 [I.V.:1500]  Out: 3125 [Urine:3125]    Physical Exam: rrr, ctab, abd moderately distended with hypoactive BS, incisions c/d/i, urine clear via wilson        Data Review   Recent Results (from the past 24 hour(s))   METABOLIC PANEL, BASIC    Collection Time: 11/05/17  4:40 AM   Result Value Ref Range    Sodium 140 136 - 145 mmol/L    Potassium 4.0 3.5 - 5.1 mmol/L    Chloride 105 98 - 107 mmol/L    CO2 25 21 - 32 mmol/L    Anion gap 10 7 - 16 mmol/L    Glucose 102 (H) 65 - 100 mg/dL    BUN 14 8 - 23 MG/DL    Creatinine 1.57 (H) 0.6 - 1.0 MG/DL    GFR est AA 42 (L) >60 ml/min/1.73m2    GFR est non-AA 35 (L) >60 ml/min/1.73m2    Calcium 8.2 (L) 8.3 - 10.4 MG/DL   MAGNESIUM    Collection Time: 11/05/17  4:40 AM   Result Value Ref Range    Magnesium 1.9 1.8 - 2.4 mg/dL   CBC W/O DIFF    Collection Time: 11/05/17  4:40 AM   Result Value Ref Range    WBC 10.2 4.3 - 11.1 K/uL    RBC 3.57 (L) 4.05 - 5.25 M/uL    HGB 10.1 (L) 11.7 - 15.4 g/dL    HCT 32.0 (L) 35.8 - 46.3 %    MCV 89.6 79.6 - 97.8 FL    MCH 28.3 26.1 - 32.9 PG    MCHC 31.6 31.4 - 35.0 g/dL    RDW 13.5 11.9 - 14.6 %    PLATELET 543 502 - 451 K/uL    MPV 9.9 (L) 10.8 - 14.1 FL           Assessment:     Active Problems:    Left renal mass (11/3/2017)      Renal cell cancer, left (HCC) (11/3/2017)      Overview: Nephrectomy 11/3/17    POD 2 s/p LEFT hand assisted laparoscopic nephrectomy    Plan:       [place]. Ambulate. Remove wilson. Chloraseptic spray prn for sore throat from ET tube. Continue only sips of clears and await return of bowel function.     Salvador Scott MD Occupational Therapy Evaluation    Visit Type: Initial Evaluation- Daily Treatment Note  Visit: 1  Referring Provider: Dina Paul MD  Medical Diagnosis (from order): M25.511 - Acute pain of right shoulder   Treatment Diagnosis: right shoulder, right elbow - increased pain/symptoms, impaired range of motion, impaired posture, impaired muscle length/flexibility, impaired strength and impaired scapulohumeral rhythm.  Chart reviewed at time of initial evaluation (relevant co-morbidities, allergies, tests and medications listed):  Hx of radiation on right side due to breast cancer, 2008   percutaneous tenotomy and calcific barbotage of  right supraspinatus tendon, 5 years ago        SUBJECTIVE                                                                                                               5 years ago patient had percutaneous tenotomy and calcific barbotage of  right supraspinatus tendon using ultrasound guidance. Patient then had frozen shoulder. Patient relates 6 months ago she was in good shape. She injured her arm last month doing yard work with oversize clippers. Now she as pain in right shoulder and elbow which does not stop her from doing things but she modifies her tasks.     Patient can tolerate resistive external rotation but not horizontal abduction as this hurts the elbow. Patient recalls things like down dog in yoga would bother the elbow.      Pain / Symptoms  - Pain rating (out of 10): Current: 3 ; Best: 3; Worst: 7  - Location: Right lateral elbow > anterior shoulder   - Quality / Description: sore, ache  - Alleviating Factors: avoiding movement in involved area, change in position, rest  - Progression since onset: worsening    Function:   Limitations / Exacerbation Factors:   - Patient reports pain, difficulty and increased time with function reported below.  Prior Level of Function: pain free ADLs and IADLs,  Personal Occupations Profile Affected: bathing/showering,  toileting/toilet hygiene, upper body dressing, meal preparation/cleanup, home establishment/managements, sleep participation, job performance    Patient Goals: decreased pain, independence with ADLs/IADLs, increased motion and increased strength.    Prior treatment  - no therapies  - Discharged from hospital, home health, or skilled nursing facility in last 30 days: no  Home Environment   - Patient lives with: adult brother  - Assistance available: intermittent  - Denies 2 or more falls or an unexplained fall with injury in the last year.  - Feel safe at home / work / school: yes     OBJECTIVE                                                                                                                    Posture:  RUE:     - Scapula Resting Position: adduction and anterior tilt;       Range of Motion (ROM)   (degrees unless noted; active unless noted; norms in ( ); negative=lacking to 0, positive=beyond 0)  Shoulder:    - Flexion (180):         Left:145           Right: 130    - Abduction (180):         Left: 145         Right: 125    - Internal Rotation:          Right: pain        - at 45°:             Left: 70            Right: 60    - External Rotation:         Left: WNL         Right: WNL  Elbow/Forearm:    - Flexion (140-150):       Right:  WNL     - Extension (0):       Right: WNL, pain   - Supination (80):       Right: WNL   - Pronation (80):       Right: WNL  Wrist:   - Flexion (60-80):       Right: WNL (distal tendon pain with passive pressure)    - Extension (60-70):       Right: WNL    Strength  (out of 5 unless noted, standard test position unless noted)   : (lbs)    - Neutral:         Left: Trial(s): 45         Right: Trial(s): 35    - Extension Neutral:          Left: Trial(s): 45         Right: Trial(s): 8, pain                     Outcome/Assessments  Outcome Measures:   Quick Disabilities of the Arm, Shoulder and Hand: QuickDash Total Score (Score will not calculate if more then 2 questions  are left blank): 29.55   (scored 0-100; a higher score indicates greater disability) see flowsheet for additional documentation      Treatment     Therapeutic Exercise  Instructed and educated pt on most appropriate therapeutic exercises to maintain flexibility and integrity of right shoulder and elbow, see chart below for specifics.     Activities of Daily Living/Self Care  Educated pt on objective findings, correlation with symptoms, and role of occupational therapy. Collaborated on goals and plan of care.      Educated pt on activity modification to reduce strain on the right shoulder and elbow including self-care, sleeping and instrumental activities of daily living.     Significant time spent educating patient on lateral epicondylitis including anatomical basis, proximal stability, and activity modifications for supportive healing, adjusting  patterns, and relationship to HEP.     Skilled input: verbal instruction/cues, tactile instruction/cues and posture correction    Writer verbally educated and received verbal consent for hand placement, positioning of patient, and techniques to be performed today from patient for therapist position for techniques and hand placement and palpation for techniques as described above and how they are pertinent to the patient's plan of care.  Home Exercise Program  Access Code: [email or web address]  URL: [email or web address]  Date: 06/19/2024  Prepared by: Aminta Johnson    Exercises  - Child's Pose with Thread the Needle  - 2 x daily - 3 reps - 30-40 hold  - Standing shoulder flexion wall slides  - 2 x daily - 10 reps - 5-20 hold  - Standing Wrist Flexion Stretch  - 3-6 x daily - 2 reps - 20 hold  - Standing Wrist Extension Stretch  - 3-6 x daily - 2 reps - 20 hold  - Prone Shoulder Extension  - 1 x daily - 3 sets - 10 reps  - Prone Scapular Retraction Arms at Side  - 1 x daily - 3 sets - 10 reps  - Prone Scapular Retraction in Abduction  - 1 x daily - 3  sets - 10 reps      ASSESSMENT                                                                                                          64 year old patient has reported functional limitations listed above impacted by signs and symptoms consistent with treatment diagnosis below.  Treatment Diagnosis:   - Involved: right shoulder, right elbow.  - Symptoms/impairments: increased pain/symptoms, impaired range of motion, impaired posture, impaired muscle length/flexibility, impaired strength and impaired scapulohumeral rhythm.    Prognosis: patient will benefit from skilled therapy  Rehabilitative: good.  Clinical decision making: Low - Patient has few limitations (1-3), comorbidities and/or complexities, as noted in problem focused assessment noted above, that impact their occupational profile.  Resulting in few treatment options and no task modification consistent with low clinical decision making complexity.    Education:   - Present and ready to learn: patient  - Results of above outlined education: Verbalizes understanding, Demonstrates understanding and Needs reinforcement    PLAN                                                                                                                           Suggestions for next session as indicated: Progress per plan of care  Right scapular mobilizations  Right shoulder joint mobilizations and passive range of motion as tolerated  Extensor wad cross friction massage  Review activity modifications for lateral epicondylitis     Goals  Decrease pain/symptoms to 2/10  The above improvements in impairments to assist in obtaining goals listed below  Long Term Goals: to be met by end of plan of care   1. Patient will reach and grasp in all planes of right upper extremity with reported manageable/tolerable pain for completion of household tasks such as cleaning and cooking.  2. Achieve  strength of 35 pounds in all 3  testing positions which would demonstrate improved  status of force load to muscles involved in lateral epicondylitis for resumption of grocery bag lift,  steering wheel, perform light home/yard maintenance tasks.   3. Quick DASH: Patient will complete form to reflect an improved calculated score to less than or equal to 15 to indicate patient reported improvement in function/disability/impairment. (minimal clinically important difference = 15.91)   4. Patient will be independent with progressed and modified home exercise program.      Therapy procedure time and total treatment time can be found documented on the Time Entry flowsheet

## 2024-07-30 DIAGNOSIS — E78.2 MIXED HYPERLIPIDEMIA: ICD-10-CM

## 2024-07-30 RX ORDER — ROSUVASTATIN CALCIUM 20 MG/1
TABLET, COATED ORAL
Qty: 90 TABLET | Refills: 3 | OUTPATIENT
Start: 2024-07-30

## 2024-08-01 ENCOUNTER — OFFICE VISIT (OUTPATIENT)
Dept: FAMILY MEDICINE CLINIC | Facility: CLINIC | Age: 74
End: 2024-08-01
Payer: MEDICARE

## 2024-08-01 VITALS
DIASTOLIC BLOOD PRESSURE: 70 MMHG | SYSTOLIC BLOOD PRESSURE: 104 MMHG | TEMPERATURE: 96.4 F | BODY MASS INDEX: 19.39 KG/M2 | WEIGHT: 106 LBS

## 2024-08-01 DIAGNOSIS — L98.9 SKIN LESION: ICD-10-CM

## 2024-08-01 DIAGNOSIS — B96.89 ACUTE BACTERIAL SINUSITIS: Primary | ICD-10-CM

## 2024-08-01 DIAGNOSIS — E03.9 ACQUIRED HYPOTHYROIDISM: ICD-10-CM

## 2024-08-01 DIAGNOSIS — J01.90 ACUTE BACTERIAL SINUSITIS: Primary | ICD-10-CM

## 2024-08-01 LAB — TSH, 3RD GENERATION: 0.12 UIU/ML (ref 0.27–4.2)

## 2024-08-01 PROCEDURE — 3074F SYST BP LT 130 MM HG: CPT | Performed by: FAMILY MEDICINE

## 2024-08-01 PROCEDURE — 1123F ACP DISCUSS/DSCN MKR DOCD: CPT | Performed by: FAMILY MEDICINE

## 2024-08-01 PROCEDURE — 3078F DIAST BP <80 MM HG: CPT | Performed by: FAMILY MEDICINE

## 2024-08-01 PROCEDURE — 99213 OFFICE O/P EST LOW 20 MIN: CPT | Performed by: FAMILY MEDICINE

## 2024-08-01 RX ORDER — ACETAMINOPHEN, CHLORPHENIRAMINE MALEATE, AND PHENYLEPHRINE HCL 325; 4; 10 MG/1; MG/1; MG/1
TABLET, MULTILAYER ORAL
Qty: 30 TABLET | Refills: 0 | Status: SHIPPED | OUTPATIENT
Start: 2024-08-01

## 2024-08-01 RX ORDER — AZITHROMYCIN 250 MG/1
250 TABLET, FILM COATED ORAL SEE ADMIN INSTRUCTIONS
Qty: 6 TABLET | Refills: 0 | Status: SHIPPED | OUTPATIENT
Start: 2024-08-01 | End: 2024-08-06

## 2024-08-01 SDOH — ECONOMIC STABILITY: FOOD INSECURITY: WITHIN THE PAST 12 MONTHS, YOU WORRIED THAT YOUR FOOD WOULD RUN OUT BEFORE YOU GOT MONEY TO BUY MORE.: NEVER TRUE

## 2024-08-01 SDOH — ECONOMIC STABILITY: INCOME INSECURITY: HOW HARD IS IT FOR YOU TO PAY FOR THE VERY BASICS LIKE FOOD, HOUSING, MEDICAL CARE, AND HEATING?: NOT HARD AT ALL

## 2024-08-01 SDOH — ECONOMIC STABILITY: FOOD INSECURITY: WITHIN THE PAST 12 MONTHS, THE FOOD YOU BOUGHT JUST DIDN'T LAST AND YOU DIDN'T HAVE MONEY TO GET MORE.: NEVER TRUE

## 2024-08-01 ASSESSMENT — ENCOUNTER SYMPTOMS
GASTROINTESTINAL NEGATIVE: 1
SINUS PRESSURE: 1
EYES NEGATIVE: 1
RESPIRATORY NEGATIVE: 1
RHINORRHEA: 1
SINUS PAIN: 0
SORE THROAT: 0
TROUBLE SWALLOWING: 0

## 2024-08-01 NOTE — PROGRESS NOTES
HISTORY OF PRESENT ILLNESS    Janeth Tolbert is a 74 y.o. female.  HPI  Chief Complaint   Patient presents with    Nasal Congestion     See above. Two week onset of increasing nasal congestion with sinus drainage. Mucus discharge is dark. No sore throat or cough. Denies fever but has periodic flushing. Concerned that thyroid dose needs adjusted.  For a few months has had a skin lesion on forehead. No symptoms but appears irritated.      Current Outpatient Medications on File Prior to Visit   Medication Sig Dispense Refill    Amantadine HCl  MG CP24 Take 137 mg by mouth nightly      sertraline (ZOLOFT) 50 MG tablet 1 po qhs 90 tablet 1    oxazepam (SERAX) 15 MG capsule Take 1 capsule by mouth every 8 hours as needed for Anxiety for up to 240 days. Max Daily Amount: 45 mg 60 capsule 3    TRULANCE 3 MG TABS Take 1 tablet by mouth daily 90 tablet 3    triamcinolone acetonide (KENALOG) 0.1 % paste Apply to aa BID prn. 5 g 1    midodrine (PROAMATINE) 10 MG tablet Take 1 tablet by mouth 3 times daily 90 tablet 3    rosuvastatin (CRESTOR) 20 MG tablet Take 20 mg by mouth daily 90 tablet 3    carbidopa-levodopa (SINEMET CR)  MG per extended release tablet TAKE 1 TABLET BY MOUTH AT BEDTIME AND 1 TABLET AT 3AM      levothyroxine (SYNTHROID) 75 MCG tablet Take 1 tablet by mouth daily 90 tablet 3    omeprazole (PRILOSEC) 40 MG delayed release capsule Take by mouth daily      Handicap Placard MISC by Does not apply route 1 each 0     No current facility-administered medications on file prior to visit.     Past Medical History:   Diagnosis Date    Cancer of kidney (HCC)     Chronic kidney disease 2015    Depression 2021    Feel like eric slways sick    Dystonic tremor     left arm    GERD (gastroesophageal reflux disease)     PRN meds    H/O complete eye exam     Dr. Tolbert, [facility]    Herpes zoster     Hypertension     Hypothyroidism     Irritable bowel syndrome     Not sure    Left renal mass 11/3/2017

## 2024-08-02 ENCOUNTER — TELEPHONE (OUTPATIENT)
Dept: FAMILY MEDICINE CLINIC | Facility: CLINIC | Age: 74
End: 2024-08-02

## 2024-08-02 RX ORDER — LEVOTHYROXINE SODIUM 0.05 MG/1
50 TABLET ORAL DAILY
Qty: 90 TABLET | Refills: 1 | Status: SHIPPED | OUTPATIENT
Start: 2024-08-02

## 2024-08-02 NOTE — TELEPHONE ENCOUNTER
----- Message from Yanick Ryder Jr., MD sent at 8/2/2024  7:33 AM EDT -----  Thyroid is a little overactive. Decrease dose to 50mcg  ----- Message -----  From: [facility] [place] Incoming [employer]  Sent: 8/1/2024   9:41 PM EDT  To: Yanick Ryder Jr., MD

## 2024-08-08 DIAGNOSIS — F41.9 ANXIETY: ICD-10-CM

## 2024-08-08 RX ORDER — OXAZEPAM 15 MG/1
15 CAPSULE ORAL EVERY 8 HOURS PRN
Qty: 60 CAPSULE | Refills: 5 | Status: SHIPPED | OUTPATIENT
Start: 2024-08-08 | End: 2025-02-04

## 2024-09-13 DIAGNOSIS — E78.2 MIXED HYPERLIPIDEMIA: ICD-10-CM

## 2024-09-13 RX ORDER — ROSUVASTATIN CALCIUM 20 MG/1
TABLET, COATED ORAL
Qty: 90 TABLET | Refills: 3 | OUTPATIENT
Start: 2024-09-13

## 2024-09-14 DIAGNOSIS — E03.9 ACQUIRED HYPOTHYROIDISM: ICD-10-CM

## 2024-09-16 RX ORDER — LEVOTHYROXINE SODIUM 75 UG/1
75 TABLET ORAL DAILY
Qty: 90 TABLET | Refills: 3 | OUTPATIENT
Start: 2024-09-16

## 2024-09-29 DIAGNOSIS — E78.2 MIXED HYPERLIPIDEMIA: ICD-10-CM

## 2024-09-30 DIAGNOSIS — F32.A MILD DEPRESSION: ICD-10-CM

## 2024-09-30 DIAGNOSIS — F41.9 ANXIETY: ICD-10-CM

## 2024-09-30 RX ORDER — ROSUVASTATIN CALCIUM 20 MG/1
TABLET, COATED ORAL
Qty: 90 TABLET | Refills: 3 | OUTPATIENT
Start: 2024-09-30

## 2024-10-26 DIAGNOSIS — F41.9 ANXIETY: ICD-10-CM

## 2024-10-26 DIAGNOSIS — F32.A MILD DEPRESSION: ICD-10-CM

## 2024-10-26 DIAGNOSIS — E78.2 MIXED HYPERLIPIDEMIA: ICD-10-CM

## 2024-10-28 ENCOUNTER — OFFICE VISIT (OUTPATIENT)
Dept: FAMILY MEDICINE CLINIC | Facility: CLINIC | Age: 74
End: 2024-10-28

## 2024-10-28 VITALS
OXYGEN SATURATION: 97 % | HEART RATE: 75 BPM | DIASTOLIC BLOOD PRESSURE: 60 MMHG | SYSTOLIC BLOOD PRESSURE: 98 MMHG | WEIGHT: 107.2 LBS | HEIGHT: 62 IN | TEMPERATURE: 97.6 F | BODY MASS INDEX: 19.73 KG/M2

## 2024-10-28 DIAGNOSIS — R05.1 ACUTE COUGH: ICD-10-CM

## 2024-10-28 DIAGNOSIS — J06.9 VIRAL URI: Primary | ICD-10-CM

## 2024-10-28 PROBLEM — E55.9 VITAMIN D DEFICIENCY DISEASE: Chronic | Status: ACTIVE | Noted: 2019-09-25

## 2024-10-28 PROBLEM — E78.5 HYPERLIPIDEMIA: Status: ACTIVE | Noted: 2022-11-01

## 2024-10-28 PROBLEM — A41.9 SEPSIS DUE TO UNDETERMINED ORGANISM (HCC): Status: RESOLVED | Noted: 2022-11-05 | Resolved: 2024-10-28

## 2024-10-28 PROBLEM — C64.2 RENAL CELL CANCER, LEFT (HCC): Status: RESOLVED | Noted: 2017-11-03 | Resolved: 2024-10-28

## 2024-10-28 PROBLEM — K59.00 CONSTIPATION: Status: ACTIVE | Noted: 2022-11-01

## 2024-10-28 PROBLEM — H02.88B MEIBOMIAN GLAND DYSFUNCTION (MGD) OF UPPER AND LOWER LIDS OF BOTH EYES: Status: ACTIVE | Noted: 2018-08-09

## 2024-10-28 PROBLEM — H02.88A MEIBOMIAN GLAND DYSFUNCTION (MGD) OF UPPER AND LOWER LIDS OF BOTH EYES: Status: ACTIVE | Noted: 2018-08-09

## 2024-10-28 PROBLEM — H25.13 NUCLEAR SCLEROTIC CATARACT OF BOTH EYES: Status: ACTIVE | Noted: 2018-08-09

## 2024-10-28 PROBLEM — Z90.5 SINGLE KIDNEY: Status: ACTIVE | Noted: 2022-02-01

## 2024-10-28 PROBLEM — I95.9 HYPOTENSION: Status: ACTIVE | Noted: 2022-11-05

## 2024-10-28 RX ORDER — DROXIDOPA 100 MG/1
100 CAPSULE ORAL 2 TIMES DAILY
COMMUNITY
Start: 2024-02-14 | End: 2025-02-13

## 2024-10-28 RX ORDER — BENZONATATE 200 MG/1
200 CAPSULE ORAL 3 TIMES DAILY PRN
Qty: 21 CAPSULE | Refills: 0 | Status: SHIPPED | OUTPATIENT
Start: 2024-10-28 | End: 2024-11-04

## 2024-10-28 RX ORDER — ROSUVASTATIN CALCIUM 20 MG/1
TABLET, COATED ORAL
Qty: 90 TABLET | Refills: 3 | OUTPATIENT
Start: 2024-10-28

## 2024-10-28 RX ORDER — DONEPEZIL HYDROCHLORIDE 5 MG/1
5 TABLET, FILM COATED ORAL EVERY MORNING
COMMUNITY
Start: 2024-09-12

## 2024-10-28 RX ORDER — CARBIDOPA AND LEVODOPA 25; 100 MG/1; MG/1
2 TABLET ORAL
COMMUNITY
Start: 2024-08-16

## 2024-10-28 RX ORDER — QUETIAPINE FUMARATE 25 MG/1
37.5 TABLET, FILM COATED ORAL NIGHTLY
COMMUNITY
Start: 2024-09-12 | End: 2025-09-12

## 2024-10-28 ASSESSMENT — ENCOUNTER SYMPTOMS
EYE REDNESS: 0
COUGH: 1
SINUS PAIN: 0
CHEST TIGHTNESS: 0
TROUBLE SWALLOWING: 0
EYE ITCHING: 0
VOMITING: 0
ABDOMINAL PAIN: 0
WHEEZING: 0
NAUSEA: 0
EYE PAIN: 0
DIARRHEA: 0
RHINORRHEA: 1
VOICE CHANGE: 0
COLOR CHANGE: 0
SINUS PRESSURE: 0
SHORTNESS OF BREATH: 0
EYE DISCHARGE: 1
SORE THROAT: 1
CONSTIPATION: 1

## 2024-10-28 NOTE — PROGRESS NOTES
sounds: Normal heart sounds. No murmur heard.  Pulmonary:      Effort: Pulmonary effort is normal. No respiratory distress.      Breath sounds: Normal breath sounds. No wheezing, rhonchi or rales.   Musculoskeletal:         General: Normal range of motion.      Cervical back: Normal range of motion and neck supple. No tenderness.      Right lower leg: No edema.      Left lower leg: No edema.   Lymphadenopathy:      Cervical: No cervical adenopathy.   Skin:     General: Skin is warm and dry.      Findings: No erythema or rash.   Neurological:      General: No focal deficit present.      Mental Status: She is alert and oriented to person, place, and time.      Sensory: No sensory deficit.      Motor: Tremor present. No weakness.   Psychiatric:         Attention and Perception: Attention normal.         Mood and Affect: Mood normal.         Behavior: Behavior normal.         Thought Content: Thought content normal.         Judgment: Judgment normal.          Office Visit on 08/01/2024   Component Date Value Ref Range Status    TSH, 3rd Generation 08/01/2024 0.119 (L)  0.270 - 4.200 uIU/mL Final       Asessment and Plan  1. Viral URI  -     benzonatate (TESSALON) 200 MG capsule; Take 1 capsule by mouth 3 times daily as needed for Cough, Disp-21 capsule, R-0Normal  2. Acute cough  -     benzonatate (TESSALON) 200 MG capsule; Take 1 capsule by mouth 3 times daily as needed for Cough, Disp-21 capsule, R-0Normal    Use home supply of Norel AD for congestion/viral URI. Begin using tessalon pearls for cough or Tussin for cough at night prn. Increase fluids and rest. Follow up if not improving.     Return if symptoms worsen or fail to improve.    JULIO CÉSAR Flores - NP  On this date I have spent 22 minutes reviewing previous notes, test results and face to face with the patient discussing the diagnosis and importance of compliance with the treatment plan as well as documenting on the day of the visit.

## 2024-10-30 ENCOUNTER — TELEPHONE (OUTPATIENT)
Dept: FAMILY MEDICINE CLINIC | Facility: CLINIC | Age: 74
End: 2024-10-30

## 2024-10-30 RX ORDER — DEXTROMETHORPHAN HYDROBROMIDE AND PROMETHAZINE HYDROCHLORIDE 15; 6.25 MG/5ML; MG/5ML
5 SYRUP ORAL 4 TIMES DAILY PRN
Qty: 120 ML | Refills: 0 | Status: SHIPPED | OUTPATIENT
Start: 2024-10-30 | End: 2024-11-06

## 2024-10-30 NOTE — TELEPHONE ENCOUNTER
Pt states she's feeling better but her cough is still really bad. Pt wondering if she needs any other medication?   Call pt if any questions    [facility] [place]

## 2024-11-04 ENCOUNTER — TELEPHONE (OUTPATIENT)
Dept: FAMILY MEDICINE CLINIC | Facility: CLINIC | Age: 74
End: 2024-11-04

## 2024-11-04 ENCOUNTER — HOSPITAL ENCOUNTER (OUTPATIENT)
Dept: GENERAL RADIOLOGY | Age: 74
Discharge: HOME | End: 2024-11-06
Payer: MEDICARE

## 2024-11-04 DIAGNOSIS — R05.3 PERSISTENT COUGH: Primary | ICD-10-CM

## 2024-11-04 DIAGNOSIS — R05.3 PERSISTENT COUGH: ICD-10-CM

## 2024-11-04 PROCEDURE — 71046 X-RAY EXAM CHEST 2 VIEWS: CPT

## 2024-11-04 NOTE — TELEPHONE ENCOUNTER
Patient's  says that patient's cough is not getting any better. They are asking for advice on what to do. Please call Chao at [phone].

## 2024-11-06 RX ORDER — BROMPHENIRAMINE MALEATE, PSEUDOEPHEDRINE HYDROCHLORIDE, AND DEXTROMETHORPHAN HYDROBROMIDE 2; 30; 10 MG/5ML; MG/5ML; MG/5ML
5 SYRUP ORAL 4 TIMES DAILY PRN
Qty: 180 ML | Refills: 0 | Status: SHIPPED | OUTPATIENT
Start: 2024-11-06

## 2024-11-06 RX ORDER — PREDNISONE 10 MG/1
TABLET ORAL
Qty: 21 EACH | Refills: 0 | Status: SHIPPED | OUTPATIENT
Start: 2024-11-06

## 2024-11-08 ENCOUNTER — TELEPHONE (OUTPATIENT)
Dept: ORTHOPEDIC SURGERY | Age: 74
End: 2024-11-08

## 2024-11-11 DIAGNOSIS — E78.2 MIXED HYPERLIPIDEMIA: ICD-10-CM

## 2024-11-11 RX ORDER — ROSUVASTATIN CALCIUM 20 MG/1
TABLET, COATED ORAL
Qty: 90 TABLET | Refills: 3 | Status: SHIPPED | OUTPATIENT
Start: 2024-11-11

## 2024-11-20 RX ORDER — LEVOTHYROXINE SODIUM 50 UG/1
50 TABLET ORAL DAILY
Qty: 90 TABLET | Refills: 1 | OUTPATIENT
Start: 2024-11-20

## 2024-11-21 ENCOUNTER — TELEPHONE (OUTPATIENT)
Dept: FAMILY MEDICINE CLINIC | Facility: CLINIC | Age: 74
End: 2024-11-21

## 2024-11-21 RX ORDER — BROMPHENIRAMINE MALEATE, PSEUDOEPHEDRINE HYDROCHLORIDE, AND DEXTROMETHORPHAN HYDROBROMIDE 2; 30; 10 MG/5ML; MG/5ML; MG/5ML
5 SYRUP ORAL 4 TIMES DAILY PRN
Qty: 180 ML | Refills: 0 | Status: SHIPPED | OUTPATIENT
Start: 2024-11-21

## 2024-11-21 RX ORDER — PREDNISONE 10 MG/1
1 TABLET ORAL SEE ADMIN INSTRUCTIONS
Qty: 1 EACH | Refills: 0 | Status: SHIPPED | OUTPATIENT
Start: 2024-11-21

## 2024-12-19 ENCOUNTER — OFFICE VISIT (OUTPATIENT)
Dept: FAMILY MEDICINE CLINIC | Facility: CLINIC | Age: 74
End: 2024-12-19
Payer: MEDICARE

## 2024-12-19 VITALS
HEIGHT: 62 IN | HEART RATE: 85 BPM | RESPIRATION RATE: 16 BRPM | BODY MASS INDEX: 20.13 KG/M2 | OXYGEN SATURATION: 97 % | WEIGHT: 109.4 LBS | SYSTOLIC BLOOD PRESSURE: 100 MMHG | TEMPERATURE: 97.1 F | DIASTOLIC BLOOD PRESSURE: 60 MMHG

## 2024-12-19 DIAGNOSIS — J30.89 NON-SEASONAL ALLERGIC RHINITIS, UNSPECIFIED TRIGGER: Primary | ICD-10-CM

## 2024-12-19 DIAGNOSIS — F41.9 ANXIETY: ICD-10-CM

## 2024-12-19 DIAGNOSIS — E03.9 ACQUIRED HYPOTHYROIDISM: ICD-10-CM

## 2024-12-19 LAB — TSH, 3RD GENERATION: 16.6 UIU/ML (ref 0.27–4.2)

## 2024-12-19 PROCEDURE — 1123F ACP DISCUSS/DSCN MKR DOCD: CPT | Performed by: FAMILY MEDICINE

## 2024-12-19 PROCEDURE — 1159F MED LIST DOCD IN RCRD: CPT | Performed by: FAMILY MEDICINE

## 2024-12-19 PROCEDURE — 1126F AMNT PAIN NOTED NONE PRSNT: CPT | Performed by: FAMILY MEDICINE

## 2024-12-19 PROCEDURE — 99213 OFFICE O/P EST LOW 20 MIN: CPT | Performed by: FAMILY MEDICINE

## 2024-12-19 PROCEDURE — 1160F RVW MEDS BY RX/DR IN RCRD: CPT | Performed by: FAMILY MEDICINE

## 2024-12-19 RX ORDER — QUETIAPINE FUMARATE 50 MG/1
50 TABLET, FILM COATED ORAL NIGHTLY
Qty: 30 TABLET | Refills: 11 | Status: SHIPPED | OUTPATIENT
Start: 2024-12-19 | End: 2025-12-19

## 2024-12-19 RX ORDER — FLUTICASONE PROPIONATE 50 MCG
2 SPRAY, SUSPENSION (ML) NASAL DAILY
Qty: 16 G | Refills: 0 | Status: SHIPPED | OUTPATIENT
Start: 2024-12-19

## 2024-12-19 NOTE — PROGRESS NOTES
Janeth Tolbert (:  1950) is a 74 y.o. female,Established patient, here for evaluation of the following chief complaint(s):  Cough (C/o Congestion, cough, runny nose last 2 months)         Assessment & Plan  Non-seasonal allergic rhinitis, unspecified trigger    Continue allegra at home. Add flonase.    Orders:    fluticasone (FLONASE) 50 MCG/ACT nasal spray; 2 sprays by Each Nostril route daily    Acquired hypothyroidism    Recheck tsh before doing any synthroid adjustment    Orders:    TSH; Future    TSH    Anxiety    Will increase seroquel dose    Orders:    QUEtiapine (SEROQUEL) 50 MG tablet; Take 1 tablet by mouth nightly      Return if symptoms worsen or fail to improve.       Subjective   74-year-old female comes in because of cough and congestion of 2 months duration. Unable to take oral steroids because increases her bp. No sinus pain. No sneezing. Taking allegra.  Cough much better. Still with nasal congestion with clear drainage. No fever or chills. No sob or wheezing. + PND  Known Hypothyroidism . Last tsh low. Apparently changed synthroid to 50 ugm but patient still taking 75 ugm. Will just recheck tsh and adjust accordingly.   Known anxiety. Taking seroquel at night which she says is not working as well as it used to. Will increase Seroquel dose        Review of Systems       Objective   Physical Exam  Constitutional:       Appearance: Normal appearance.   HENT:      Head: Normocephalic.      Comments: No sinus tenderness     Right Ear: Tympanic membrane, ear canal and external ear normal.      Left Ear: Tympanic membrane, ear canal and external ear normal.      Nose: Congestion present. No rhinorrhea.      Mouth/Throat:      Pharynx: No oropharyngeal exudate or posterior oropharyngeal erythema.   Neck:      Vascular: No carotid bruit.   Cardiovascular:      Rate and Rhythm: Normal rate and regular rhythm.      Heart sounds: Normal heart sounds.   Pulmonary:      Effort: Pulmonary effort

## 2024-12-26 ENCOUNTER — TELEPHONE (OUTPATIENT)
Dept: FAMILY MEDICINE CLINIC | Facility: CLINIC | Age: 74
End: 2024-12-26

## 2024-12-26 NOTE — TELEPHONE ENCOUNTER
----- Message from Dr. Yanick Ryder MD sent at 12/23/2024  8:18 AM EST -----  Please increase synthroid to 100mcg; recheck tsh 6 weeks  ----- Message -----  From: Luis Manuel Steward MD  Sent: 12/23/2024   8:05 AM EST  To: Yanick Ryder Jr., MD    Patient taking synthroid 75 ugm at present. Last tsh was low (8/24) and per med list was supposed to be taking synthroid 50 ugm but never did. This was noted when she saw me. I did not do any adjustments on last visit but decided to just repeat tsh which is now elevated. Will leave up to you to do adjustments and ffup.  ----- Message -----  From: [facility] [place] Incoming [employer]  Sent: 12/19/2024   9:57 PM EST  To: Luis Manuel Steward MD

## 2024-12-27 RX ORDER — LEVOTHYROXINE SODIUM 100 UG/1
100 TABLET ORAL DAILY
Qty: 90 TABLET | Refills: 1 | Status: SHIPPED | OUTPATIENT
Start: 2024-12-27

## 2025-01-03 ENCOUNTER — TELEMEDICINE (OUTPATIENT)
Dept: FAMILY MEDICINE CLINIC | Facility: CLINIC | Age: 75
End: 2025-01-03

## 2025-01-03 DIAGNOSIS — G20.A2 PARKINSON'S DISEASE WITHOUT DYSKINESIA, WITH FLUCTUATING MANIFESTATIONS (HCC): ICD-10-CM

## 2025-01-03 DIAGNOSIS — N18.4 STAGE 4 CHRONIC KIDNEY DISEASE (HCC): ICD-10-CM

## 2025-01-03 DIAGNOSIS — K12.0 APHTHOUS ULCER: Primary | ICD-10-CM

## 2025-01-03 PROBLEM — G20.A1 PARKINSON'S DISEASE (HCC): Status: RESOLVED | Noted: 2024-01-04 | Resolved: 2025-01-03

## 2025-01-03 PROBLEM — G20.A1 PARKINSON DISEASE (HCC): Status: RESOLVED | Noted: 2017-07-28 | Resolved: 2025-01-03

## 2025-01-03 RX ORDER — VALACYCLOVIR HYDROCHLORIDE 1 G/1
1000 TABLET, FILM COATED ORAL 3 TIMES DAILY
Qty: 21 TABLET | Refills: 0 | Status: SHIPPED | OUTPATIENT
Start: 2025-01-03 | End: 2025-01-10

## 2025-01-03 ASSESSMENT — ENCOUNTER SYMPTOMS
RESPIRATORY NEGATIVE: 1
SORE THROAT: 0
TROUBLE SWALLOWING: 0
GASTROINTESTINAL NEGATIVE: 1
EYES NEGATIVE: 1
FACIAL SWELLING: 0
RHINORRHEA: 0

## 2025-01-03 NOTE — PROGRESS NOTES
1/3/2025    TELEHEALTH EVALUATION -- Audio/Visual    HPI:    Janeth Goode Didi (:  1950) has requested an audio/video evaluation for the following concern(s):    History of Parkinson's followed by neurologist; stable.  CKD monitored by nephrologist.  Onset 2 days ago of painful blisters on lips and inside mouth. No fever. No difficulty swallowing. Denies joint pain or myalgias. No congestion or sore throat.      Past Medical History:   Diagnosis Date    Cancer of kidney ([facility])     Chronic kidney disease     Depression     Feel like eric slways sick    Dystonic tremor     left arm    GERD (gastroesophageal reflux disease)     PRN meds    H/O complete eye exam     Dr. Tolbert, [facility]    Herpes zoster     Hypertension     Hypothyroidism     Irritable bowel syndrome     Not sure    Left renal mass 11/3/2017    Neuropathy     Not sure    Renal cell cancer, left ([facility]) 2017    Nephrectomy 11/3/17         Sepsis due to undetermined organism ([facility]) 2022    Solitary right kidney          Review of Systems   Constitutional: Negative.    HENT:  Positive for mouth sores. Negative for congestion, ear pain, facial swelling, postnasal drip, rhinorrhea, sore throat and trouble swallowing.    Eyes: Negative.    Respiratory: Negative.     Cardiovascular: Negative.    Gastrointestinal: Negative.    Genitourinary: Negative.    Musculoskeletal: Negative.    Neurological: Negative.        Prior to Visit Medications    Medication Sig Taking? Authorizing Provider   valACYclovir (VALTREX) 1 g tablet Take 1 tablet by mouth 3 times daily for 7 days Yes Yanick Ryder Jr., MD   levothyroxine (SYNTHROID) 100 MCG tablet Take 1 tablet by mouth daily  Yanick Ryder Jr., MD   fluticasone (FLONASE) 50 MCG/ACT nasal spray 2 sprays by Each Nostril route daily  Luis Manuel Steward MD   QUEtiapine (SEROQUEL) 50 MG tablet Take 1 tablet by mouth nightly  Luis Manuel Steward MD   brompheniramine-pseudoephedrine-DM

## 2025-01-15 DIAGNOSIS — F32.A MILD DEPRESSION: ICD-10-CM

## 2025-01-15 DIAGNOSIS — E03.9 ACQUIRED HYPOTHYROIDISM: ICD-10-CM

## 2025-01-15 DIAGNOSIS — F41.9 ANXIETY: ICD-10-CM

## 2025-01-15 RX ORDER — LEVOTHYROXINE SODIUM 75 UG/1
75 TABLET ORAL DAILY
Qty: 90 TABLET | Refills: 3 | OUTPATIENT
Start: 2025-01-15

## 2025-01-24 ENCOUNTER — TELEPHONE (OUTPATIENT)
Dept: FAMILY MEDICINE CLINIC | Facility: CLINIC | Age: 75
End: 2025-01-24

## 2025-01-24 ENCOUNTER — OFFICE VISIT (OUTPATIENT)
Dept: FAMILY MEDICINE CLINIC | Facility: CLINIC | Age: 75
End: 2025-01-24

## 2025-01-24 VITALS
WEIGHT: 114.6 LBS | BODY MASS INDEX: 21.09 KG/M2 | TEMPERATURE: 98.1 F | DIASTOLIC BLOOD PRESSURE: 64 MMHG | SYSTOLIC BLOOD PRESSURE: 112 MMHG | HEART RATE: 80 BPM | OXYGEN SATURATION: 98 % | HEIGHT: 62 IN

## 2025-01-24 DIAGNOSIS — K12.0 APHTHOUS ULCER: ICD-10-CM

## 2025-01-24 DIAGNOSIS — K13.0 CHEILITIS: Primary | ICD-10-CM

## 2025-01-24 DIAGNOSIS — K13.0 CHEILITIS: ICD-10-CM

## 2025-01-24 DIAGNOSIS — J01.00 ACUTE NON-RECURRENT MAXILLARY SINUSITIS: Primary | ICD-10-CM

## 2025-01-24 RX ORDER — CLOTRIMAZOLE AND BETAMETHASONE DIPROPIONATE 10; .64 MG/G; MG/G
CREAM TOPICAL
Qty: 15 G | Refills: 0 | Status: SHIPPED | OUTPATIENT
Start: 2025-01-24

## 2025-01-24 RX ORDER — AMOXICILLIN 500 MG/1
500 CAPSULE ORAL 2 TIMES DAILY
Qty: 20 CAPSULE | Refills: 0 | Status: SHIPPED | OUTPATIENT
Start: 2025-01-24 | End: 2025-02-03

## 2025-01-24 ASSESSMENT — ENCOUNTER SYMPTOMS
NAUSEA: 0
TROUBLE SWALLOWING: 0
EYE REDNESS: 0
VOMITING: 0
SHORTNESS OF BREATH: 0
CONSTIPATION: 0
VOICE CHANGE: 1
BLOOD IN STOOL: 0
EYE PAIN: 0
COUGH: 0
DIARRHEA: 0
SINUS PRESSURE: 1
BACK PAIN: 0
SORE THROAT: 1

## 2025-01-24 NOTE — PROGRESS NOTES
Janeth Tolbert (:  1950) is a 74 y.o. female,Established patient, here for evaluation of the following chief complaint(s):  Rash (Red, itchy, burning rash around mouth 1+weeks. She recently finished Valtrex 1/3/2025)         Assessment & Plan  Acute non-recurrent maxillary sinusitis   Acute condition, new, Supportive care with appropriate antipyretics and fluids.  Start Amoxil as directed.  Discussed medications, side effects and risks versus benefits in detail.  Supportive care encouraged for symptom management.  Return precautions discussed and she and spouse voiced understanding.    Orders:    amoxicillin (AMOXIL) 500 MG capsule; Take 1 capsule by mouth 2 times daily for 10 days    Cheilitis   New, not at goal (unstable), start Lotrisone BID as directed for up to two weeks.  Discussed medications, side effects and risks versus benefits in detail.  Can use magic mouthwash for discomfort/pain PRN.    Orders:    clotrimazole-betamethasone (LOTRISONE) 1-0.05 % cream; Apply topically 2 times daily.    Magic Mouthwash (MIRACLE MOUTHWASH); Swish and spit 5 mLs 4 times daily as needed for Irritation    Aphthous ulcer       Orders:    clotrimazole-betamethasone (LOTRISONE) 1-0.05 % cream; Apply topically 2 times daily.    Magic Mouthwash (MIRACLE MOUTHWASH); Swish and spit 5 mLs 4 times daily as needed for Irritation      Return if symptoms worsen or fail to improve.       Subjective   Rash  Associated symptoms include congestion and a sore throat. Pertinent negatives include no cough, diarrhea, eye pain, fever, shortness of breath or vomiting.   Red, itchy, burning rash to both corners of mouth for >1 week.  Patient saw PCP and was prescribed Valtrex without relief for ulcers.  Also endorses nasal congestion, PND, sinus pressure and sore throat x 1 week.  No cough, CP, SOB or wheezing.  Afebrile.  No nausea or vomiting.  No difficulty swallowing or issues with airway.  Denies new

## 2025-02-07 DIAGNOSIS — E78.2 MIXED HYPERLIPIDEMIA: ICD-10-CM

## 2025-02-07 RX ORDER — ROSUVASTATIN CALCIUM 20 MG/1
TABLET, COATED ORAL
Qty: 90 TABLET | Refills: 3 | OUTPATIENT
Start: 2025-02-07

## 2025-02-21 DIAGNOSIS — F32.A MILD DEPRESSION: ICD-10-CM

## 2025-02-21 DIAGNOSIS — F41.9 ANXIETY: ICD-10-CM

## 2025-03-10 DIAGNOSIS — E03.9 ACQUIRED HYPOTHYROIDISM: ICD-10-CM

## 2025-03-10 RX ORDER — LEVOTHYROXINE SODIUM 75 UG/1
75 TABLET ORAL DAILY
Qty: 90 TABLET | Refills: 3 | OUTPATIENT
Start: 2025-03-10

## 2025-03-14 DIAGNOSIS — E03.9 ACQUIRED HYPOTHYROIDISM: ICD-10-CM

## 2025-03-14 RX ORDER — LEVOTHYROXINE SODIUM 75 UG/1
75 TABLET ORAL DAILY
Qty: 90 TABLET | Refills: 3 | OUTPATIENT
Start: 2025-03-14

## 2025-04-04 RX ORDER — LEVOTHYROXINE SODIUM 100 UG/1
100 TABLET ORAL DAILY
Qty: 90 TABLET | Refills: 1 | OUTPATIENT
Start: 2025-04-04

## 2025-04-04 RX ORDER — LEVOTHYROXINE SODIUM 75 UG/1
75 TABLET ORAL DAILY
Qty: 90 TABLET | Refills: 3 | OUTPATIENT
Start: 2025-04-04

## 2025-04-04 RX ORDER — PLECANATIDE 3 MG/1
1 TABLET ORAL DAILY
Qty: 30 TABLET | Refills: 11 | OUTPATIENT
Start: 2025-04-04

## 2025-04-11 RX ORDER — LEVOTHYROXINE SODIUM 75 UG/1
75 TABLET ORAL DAILY
Qty: 90 TABLET | Refills: 3 | OUTPATIENT
Start: 2025-04-11

## 2025-05-21 ENCOUNTER — OFFICE VISIT (OUTPATIENT)
Dept: FAMILY MEDICINE CLINIC | Facility: CLINIC | Age: 75
End: 2025-05-21
Payer: MEDICARE

## 2025-05-21 VITALS
WEIGHT: 113 LBS | SYSTOLIC BLOOD PRESSURE: 128 MMHG | TEMPERATURE: 97.1 F | OXYGEN SATURATION: 90 % | HEART RATE: 57 BPM | DIASTOLIC BLOOD PRESSURE: 72 MMHG | BODY MASS INDEX: 20.67 KG/M2

## 2025-05-21 DIAGNOSIS — R05.1 ACUTE COUGH: Primary | ICD-10-CM

## 2025-05-21 DIAGNOSIS — K13.0 CHEILITIS: ICD-10-CM

## 2025-05-21 DIAGNOSIS — R05.1 ACUTE COUGH: ICD-10-CM

## 2025-05-21 DIAGNOSIS — E03.9 HYPOTHYROIDISM, UNSPECIFIED TYPE: ICD-10-CM

## 2025-05-21 LAB
BASOPHILS # BLD: 0.05 K/UL (ref 0–0.2)
BASOPHILS NFR BLD: 0.7 % (ref 0–2)
DIFFERENTIAL METHOD BLD: ABNORMAL
EOSINOPHIL # BLD: 0.1 K/UL (ref 0–0.8)
EOSINOPHIL NFR BLD: 1.4 % (ref 0.5–7.8)
ERYTHROCYTE [DISTWIDTH] IN BLOOD BY AUTOMATED COUNT: 13.5 % (ref 11.9–14.6)
HCT VFR BLD AUTO: 39.3 % (ref 35.8–46.3)
HGB BLD-MCNC: 12.4 G/DL (ref 11.7–15.4)
IMM GRANULOCYTES # BLD AUTO: 0.02 K/UL (ref 0–0.5)
IMM GRANULOCYTES NFR BLD AUTO: 0.3 % (ref 0–5)
LYMPHOCYTES # BLD: 1.12 K/UL (ref 0.5–4.6)
LYMPHOCYTES NFR BLD: 15.9 % (ref 13–44)
MCH RBC QN AUTO: 30.8 PG (ref 26.1–32.9)
MCHC RBC AUTO-ENTMCNC: 31.6 G/DL (ref 31.4–35)
MCV RBC AUTO: 97.8 FL (ref 82–102)
MONOCYTES # BLD: 0.56 K/UL (ref 0.1–1.3)
MONOCYTES NFR BLD: 7.9 % (ref 4–12)
NEUTS SEG # BLD: 5.21 K/UL (ref 1.7–8.2)
NEUTS SEG NFR BLD: 73.8 % (ref 43–78)
NRBC # BLD: 0 K/UL (ref 0–0.2)
PLATELET # BLD AUTO: 278 K/UL (ref 150–450)
PMV BLD AUTO: 10 FL (ref 9.4–12.3)
RBC # BLD AUTO: 4.02 M/UL (ref 4.05–5.2)
WBC # BLD AUTO: 7.1 K/UL (ref 4.3–11.1)

## 2025-05-21 PROCEDURE — 1123F ACP DISCUSS/DSCN MKR DOCD: CPT | Performed by: NURSE PRACTITIONER

## 2025-05-21 PROCEDURE — 1159F MED LIST DOCD IN RCRD: CPT | Performed by: NURSE PRACTITIONER

## 2025-05-21 PROCEDURE — 1160F RVW MEDS BY RX/DR IN RCRD: CPT | Performed by: NURSE PRACTITIONER

## 2025-05-21 PROCEDURE — 99214 OFFICE O/P EST MOD 30 MIN: CPT | Performed by: NURSE PRACTITIONER

## 2025-05-21 RX ORDER — CLOTRIMAZOLE AND BETAMETHASONE DIPROPIONATE 10; .64 MG/G; MG/G
CREAM TOPICAL
Qty: 15 G | Refills: 0 | Status: SHIPPED | OUTPATIENT
Start: 2025-05-21

## 2025-05-21 SDOH — ECONOMIC STABILITY: FOOD INSECURITY: WITHIN THE PAST 12 MONTHS, YOU WORRIED THAT YOUR FOOD WOULD RUN OUT BEFORE YOU GOT MONEY TO BUY MORE.: NEVER TRUE

## 2025-05-21 SDOH — ECONOMIC STABILITY: FOOD INSECURITY: WITHIN THE PAST 12 MONTHS, THE FOOD YOU BOUGHT JUST DIDN'T LAST AND YOU DIDN'T HAVE MONEY TO GET MORE.: NEVER TRUE

## 2025-05-21 ASSESSMENT — PATIENT HEALTH QUESTIONNAIRE - PHQ9
SUM OF ALL RESPONSES TO PHQ QUESTIONS 1-9: 0
SUM OF ALL RESPONSES TO PHQ QUESTIONS 1-9: 0
6. FEELING BAD ABOUT YOURSELF - OR THAT YOU ARE A FAILURE OR HAVE LET YOURSELF OR YOUR FAMILY DOWN: NOT AT ALL
3. TROUBLE FALLING OR STAYING ASLEEP: NOT AT ALL
5. POOR APPETITE OR OVEREATING: NOT AT ALL
SUM OF ALL RESPONSES TO PHQ QUESTIONS 1-9: 0
4. FEELING TIRED OR HAVING LITTLE ENERGY: NOT AT ALL
8. MOVING OR SPEAKING SO SLOWLY THAT OTHER PEOPLE COULD HAVE NOTICED. OR THE OPPOSITE, BEING SO FIGETY OR RESTLESS THAT YOU HAVE BEEN MOVING AROUND A LOT MORE THAN USUAL: NOT AT ALL
1. LITTLE INTEREST OR PLEASURE IN DOING THINGS: NOT AT ALL
9. THOUGHTS THAT YOU WOULD BE BETTER OFF DEAD, OR OF HURTING YOURSELF: NOT AT ALL
2. FEELING DOWN, DEPRESSED OR HOPELESS: NOT AT ALL
10. IF YOU CHECKED OFF ANY PROBLEMS, HOW DIFFICULT HAVE THESE PROBLEMS MADE IT FOR YOU TO DO YOUR WORK, TAKE CARE OF THINGS AT HOME, OR GET ALONG WITH OTHER PEOPLE: NOT DIFFICULT AT ALL
7. TROUBLE CONCENTRATING ON THINGS, SUCH AS READING THE NEWSPAPER OR WATCHING TELEVISION: NOT AT ALL
SUM OF ALL RESPONSES TO PHQ QUESTIONS 1-9: 0

## 2025-05-21 ASSESSMENT — ENCOUNTER SYMPTOMS
CHEST TIGHTNESS: 0
NAUSEA: 0
SORE THROAT: 0
COUGH: 1
CONSTIPATION: 0
SINUS PAIN: 0
WHEEZING: 0
SHORTNESS OF BREATH: 0
EYE REDNESS: 0
DIARRHEA: 0
BACK PAIN: 0
EYE PAIN: 0
BLOOD IN STOOL: 0
VOMITING: 0

## 2025-05-21 NOTE — PROGRESS NOTES
Janeth Tolbert (:  1950) is a 75 y.o. female,Established patient, here for evaluation of the following chief complaint(s):  Cough (X4 months) and Mouth Lesions (X4 months)         Assessment & Plan  Acute cough   New, not at goal (unstable), await labs and xray.  Restart Allegra and Flonase.  Consider pulm referral if needed.    Orders:  •  CBC with Auto Differential; Future  •  TSH; Future  •  XR CHEST PA LAT (2 VIEWS); Future  •  Comprehensive Metabolic Panel; Future    Hypothyroidism, unspecified type   Chronic, not at goal (unstable), last TSH 16.000 2024.  Await repeat TSH.    Orders:  •  TSH; Future    Cheilitis   New, not at goal (unstable), trial of Lotrisone.  Discussed medications, side effects and risks versus benefits in detail.    Orders:  •  clotrimazole-betamethasone (LOTRISONE) 1-0.05 % cream; Apply topically 2 times daily.      No follow-ups on file.       Subjective   HPI  Nasal congestion and dry cough x months.  Waxing and waning.  Afebrile.  No CP, SOB or wheezing.  No sinus pain.  Denies allergies and GERD.  Not on an ace inhibitor.  Was taking Allegra but stopped.  Ongoing associated celitis that did not respond to antibiotics or Vaseline.      Review of Systems   Constitutional:  Negative for chills and fever.   HENT:  Positive for congestion. Negative for ear pain, sinus pain and sore throat.    Eyes:  Negative for pain and redness.   Respiratory:  Positive for cough. Negative for chest tightness, shortness of breath and wheezing.    Cardiovascular:  Negative for chest pain and palpitations.   Gastrointestinal:  Negative for blood in stool, constipation, diarrhea, nausea and vomiting.   Endocrine: Negative for polydipsia.   Genitourinary:  Negative for dysuria, frequency and urgency.   Musculoskeletal:  Negative for arthralgias, back pain and myalgias.   Skin:  Positive for rash.   Allergic/Immunologic: Positive for environmental allergies.   Neurological:  Negative for

## 2025-05-21 NOTE — ASSESSMENT & PLAN NOTE
Chronic, not at goal (unstable), last TSH 16.000 12/2024.  Await repeat TSH.    Orders:    TSH; Future

## 2025-05-22 ENCOUNTER — RESULTS FOLLOW-UP (OUTPATIENT)
Dept: FAMILY MEDICINE CLINIC | Facility: CLINIC | Age: 75
End: 2025-05-22

## 2025-05-22 ENCOUNTER — HOSPITAL ENCOUNTER (OUTPATIENT)
Dept: GENERAL RADIOLOGY | Age: 75
Discharge: HOME | End: 2025-05-24
Payer: MEDICARE

## 2025-05-22 DIAGNOSIS — E03.9 HYPOTHYROIDISM, UNSPECIFIED TYPE: Primary | ICD-10-CM

## 2025-05-22 DIAGNOSIS — R05.1 ACUTE COUGH: ICD-10-CM

## 2025-05-22 LAB
ALBUMIN SERPL-MCNC: 4.1 G/DL (ref 3.2–4.6)
ALBUMIN/GLOB SERPL: 1.4 (ref 1–1.9)
ALP SERPL-CCNC: 45 U/L (ref 35–104)
ALT SERPL-CCNC: 6 U/L (ref 8–45)
ANION GAP SERPL CALC-SCNC: 14 MMOL/L (ref 7–16)
AST SERPL-CCNC: 26 U/L (ref 15–37)
BILIRUB SERPL-MCNC: 0.3 MG/DL (ref 0–1.2)
BUN SERPL-MCNC: 38 MG/DL (ref 8–23)
CALCIUM SERPL-MCNC: 9.4 MG/DL (ref 8.8–10.2)
CHLORIDE SERPL-SCNC: 107 MMOL/L (ref 98–107)
CO2 SERPL-SCNC: 21 MMOL/L (ref 20–29)
CREAT SERPL-MCNC: 1.81 MG/DL (ref 0.6–1.1)
GLOBULIN SER CALC-MCNC: 2.9 G/DL (ref 2.3–3.5)
GLUCOSE SERPL-MCNC: 87 MG/DL (ref 70–99)
POTASSIUM SERPL-SCNC: 4.5 MMOL/L (ref 3.5–5.1)
PROT SERPL-MCNC: 7.1 G/DL (ref 6.3–8.2)
SODIUM SERPL-SCNC: 142 MMOL/L (ref 136–145)
TSH, 3RD GENERATION: 0.27 UIU/ML (ref 0.27–4.2)

## 2025-05-22 PROCEDURE — 71046 X-RAY EXAM CHEST 2 VIEWS: CPT

## 2025-05-22 RX ORDER — LEVOTHYROXINE SODIUM 88 UG/1
88 TABLET ORAL DAILY
Qty: 90 TABLET | Refills: 1 | Status: SHIPPED | OUTPATIENT
Start: 2025-05-22

## 2025-05-27 NOTE — TELEPHONE ENCOUNTER
----- Message from JULIO CÉSAR Nunez CNP sent at 5/22/2025  6:41 PM EDT -----  Please notify CMP/CBC stable but TSH now shows hyperthyroidism.  I think she should decrease to 88 mcg daily and recheck a level in 6 weeks.  Med sent to pharm on file and future TSH order placed.  am

## 2025-05-29 RX ORDER — BROMPHENIRAMINE MALEATE, PSEUDOEPHEDRINE HYDROCHLORIDE, AND DEXTROMETHORPHAN HYDROBROMIDE 2; 30; 10 MG/5ML; MG/5ML; MG/5ML
5 SYRUP ORAL 4 TIMES DAILY PRN
Qty: 150 ML | Refills: 0 | Status: SHIPPED | OUTPATIENT
Start: 2025-05-29

## 2025-05-30 DIAGNOSIS — K13.0 CHEILITIS: ICD-10-CM

## 2025-05-30 RX ORDER — CLOTRIMAZOLE AND BETAMETHASONE DIPROPIONATE 10; .64 MG/G; MG/G
CREAM TOPICAL 2 TIMES DAILY
Qty: 15 G | Refills: 0 | OUTPATIENT
Start: 2025-05-30

## 2025-05-30 RX ORDER — LEVOTHYROXINE SODIUM 75 UG/1
75 TABLET ORAL DAILY
Qty: 90 TABLET | Refills: 3 | OUTPATIENT
Start: 2025-05-30

## 2025-08-19 ENCOUNTER — TELEPHONE (OUTPATIENT)
Dept: FAMILY MEDICINE CLINIC | Facility: CLINIC | Age: 75
End: 2025-08-19

## (undated) DEVICE — CONTAINER SPEC 32OZ POLYPR HI GRD DURABLE W/ SNAP LOK LID

## (undated) DEVICE — SUTURE PDS II SZ 1 L36IN ABSRB VLT L48MM CTX 1/2 CIR Z371T

## (undated) DEVICE — SUTURE VCRL SZ 0 L54IN ABSRB UD LIGAPAK REEL NDL J287G

## (undated) DEVICE — SOLUTION IRRIG 3000ML 0.9% SOD CHL FLX CONT 0797208] ICU MEDICAL INC]

## (undated) DEVICE — LOGICUT SCISSOR LENGTH 320MM: Brand: LOGI - LAPAROSCOPIC INSTRUMENT SYSTEM

## (undated) DEVICE — [HIGH FLOW INSUFFLATOR,  DO NOT USE IF PACKAGE IS DAMAGED,  KEEP DRY,  KEEP AWAY FROM SUNLIGHT,  PROTECT FROM HEAT AND RADIOACTIVE SOURCES.]: Brand: PNEUMOSURE

## (undated) DEVICE — ARTICULATING RELOAD WITH TRI-STAPLE TECHNOLOGY: Brand: ENDO GIA

## (undated) DEVICE — SLIM BODY SKIN STAPLER: Brand: APPOSE ULC

## (undated) DEVICE — ACCESS PLATFORM FOR MINIMALLY INVASIVE SURGERY.: Brand: GELPORT® LAPAROSCOPIC  SYSTEM

## (undated) DEVICE — LAP CHOLE: Brand: MEDLINE INDUSTRIES, INC.

## (undated) DEVICE — TRAY CATH 16F URIN MTR LTX -- CONVERT TO ITEM 363111

## (undated) DEVICE — (D)PREP SKN CHLRAPRP APPL 26ML -- CONVERT TO ITEM 371833

## (undated) DEVICE — 3M™ IOBAN™ 2 ANTIMICROBIAL INCISE DRAPE 6650EZ: Brand: IOBAN™ 2

## (undated) DEVICE — VISUALIZATION SYSTEM: Brand: CLEARIFY

## (undated) DEVICE — REM POLYHESIVE ADULT PATIENT RETURN ELECTRODE: Brand: VALLEYLAB

## (undated) DEVICE — UNIVERSAL STAPLER: Brand: ENDO GIA ULTRA

## (undated) DEVICE — 3000CC GUARDIAN II: Brand: GUARDIAN

## (undated) DEVICE — BUTTON SWITCH PENCIL BLADE ELECTRODE, HOLSTER: Brand: EDGE

## (undated) DEVICE — POUCH SPEC RETRV SHFT 15MM 13X23CM GRN POLYUR DBL RNG HNDL

## (undated) DEVICE — 2, DISPOSABLE SUCTION/IRRIGATOR WITHOUT DISPOSABLE TIP: Brand: STRYKEFLOW

## (undated) DEVICE — CLIP APPLIER: Brand: ENDO CLIP II

## (undated) DEVICE — SUTURE PDS II SZ 1 L27IN ABSRB VLT CT-1 L36MM 1/2 CIR Z341H

## (undated) DEVICE — ARTICULATION RELOAD WITH TRI-STAPLE TECHNOLOGY: Brand: ENDO GIA

## (undated) DEVICE — BLADELESS OPTICAL TROCAR WITH FIXATION CANNULA: Brand: VERSAONE

## (undated) DEVICE — SHEARS ENDOSCP L36CM DIA5MM ULTRASONIC CRV TIP W/ ADV